# Patient Record
Sex: MALE | Race: WHITE | NOT HISPANIC OR LATINO | URBAN - METROPOLITAN AREA
[De-identification: names, ages, dates, MRNs, and addresses within clinical notes are randomized per-mention and may not be internally consistent; named-entity substitution may affect disease eponyms.]

---

## 2017-01-13 ENCOUNTER — INPATIENT (INPATIENT)
Facility: HOSPITAL | Age: 67
LOS: 17 days | Discharge: NURSING, REHAB OR LONG-TERM CARE | End: 2017-01-31
Attending: PSYCHIATRY & NEUROLOGY | Admitting: PSYCHIATRY & NEUROLOGY
Payer: MEDICARE

## 2017-01-13 VITALS
HEART RATE: 98 BPM | DIASTOLIC BLOOD PRESSURE: 100 MMHG | SYSTOLIC BLOOD PRESSURE: 168 MMHG | RESPIRATION RATE: 16 BRPM | TEMPERATURE: 98 F

## 2017-01-13 DIAGNOSIS — F33.9 MAJOR DEPRESSIVE DISORDER, RECURRENT, UNSPECIFIED: ICD-10-CM

## 2017-01-13 DIAGNOSIS — F20.9 SCHIZOPHRENIA, UNSPECIFIED: ICD-10-CM

## 2017-01-13 DIAGNOSIS — R69 ILLNESS, UNSPECIFIED: ICD-10-CM

## 2017-01-13 LAB
ALBUMIN SERPL ELPH-MCNC: 4.7 G/DL — SIGNIFICANT CHANGE UP (ref 3.3–5)
ALP SERPL-CCNC: 67 U/L — SIGNIFICANT CHANGE UP (ref 40–120)
ALT FLD-CCNC: 19 U/L — SIGNIFICANT CHANGE UP (ref 4–41)
AMPHET UR-MCNC: NEGATIVE — SIGNIFICANT CHANGE UP
APAP SERPL-MCNC: < 15 UG/ML — LOW (ref 15–25)
APPEARANCE UR: CLEAR — SIGNIFICANT CHANGE UP
AST SERPL-CCNC: 22 U/L — SIGNIFICANT CHANGE UP (ref 4–40)
BARBITURATES MEASUREMENT: NEGATIVE — SIGNIFICANT CHANGE UP
BARBITURATES UR SCN-MCNC: NEGATIVE — SIGNIFICANT CHANGE UP
BASOPHILS # BLD AUTO: 0.02 K/UL — SIGNIFICANT CHANGE UP (ref 0–0.2)
BASOPHILS NFR BLD AUTO: 0.2 % — SIGNIFICANT CHANGE UP (ref 0–2)
BASOPHILS NFR SPEC: 0 % — SIGNIFICANT CHANGE UP (ref 0–2)
BENZODIAZ SERPL-MCNC: NEGATIVE — SIGNIFICANT CHANGE UP
BENZODIAZ UR-MCNC: NEGATIVE — SIGNIFICANT CHANGE UP
BILIRUB SERPL-MCNC: 0.4 MG/DL — SIGNIFICANT CHANGE UP (ref 0.2–1.2)
BILIRUB UR-MCNC: NEGATIVE — SIGNIFICANT CHANGE UP
BLASTS # FLD: 0 % — SIGNIFICANT CHANGE UP (ref 0–0)
BLOOD UR QL VISUAL: NEGATIVE — SIGNIFICANT CHANGE UP
BUN SERPL-MCNC: 10 MG/DL — SIGNIFICANT CHANGE UP (ref 7–23)
CALCIUM SERPL-MCNC: 10 MG/DL — SIGNIFICANT CHANGE UP (ref 8.4–10.5)
CANNABINOIDS UR-MCNC: NEGATIVE — SIGNIFICANT CHANGE UP
CHLORIDE SERPL-SCNC: 97 MMOL/L — LOW (ref 98–107)
CO2 SERPL-SCNC: 22 MMOL/L — SIGNIFICANT CHANGE UP (ref 22–31)
COCAINE METAB.OTHER UR-MCNC: NEGATIVE — SIGNIFICANT CHANGE UP
COLOR SPEC: SIGNIFICANT CHANGE UP
CREAT SERPL-MCNC: 0.82 MG/DL — SIGNIFICANT CHANGE UP (ref 0.5–1.3)
EOSINOPHIL # BLD AUTO: 0.09 K/UL — SIGNIFICANT CHANGE UP (ref 0–0.5)
EOSINOPHIL NFR BLD AUTO: 0.8 % — SIGNIFICANT CHANGE UP (ref 0–6)
EOSINOPHIL NFR FLD: 0.9 % — SIGNIFICANT CHANGE UP (ref 0–6)
ETHANOL BLD-MCNC: < 10 MG/DL — SIGNIFICANT CHANGE UP
GLUCOSE SERPL-MCNC: 107 MG/DL — HIGH (ref 70–99)
GLUCOSE UR-MCNC: NEGATIVE — SIGNIFICANT CHANGE UP
HCT VFR BLD CALC: 40.4 % — SIGNIFICANT CHANGE UP (ref 39–50)
HGB BLD-MCNC: 12.9 G/DL — LOW (ref 13–17)
IMM GRANULOCYTES NFR BLD AUTO: 0.3 % — SIGNIFICANT CHANGE UP (ref 0–1.5)
KETONES UR-MCNC: NEGATIVE — SIGNIFICANT CHANGE UP
LEUKOCYTE ESTERASE UR-ACNC: NEGATIVE — SIGNIFICANT CHANGE UP
LYMPHOCYTES # BLD AUTO: 0.96 K/UL — LOW (ref 1–3.3)
LYMPHOCYTES # BLD AUTO: 8.8 % — LOW (ref 13–44)
LYMPHOCYTES NFR SPEC AUTO: 5.2 % — LOW (ref 13–44)
MAGNESIUM SERPL-MCNC: 2.1 MG/DL — SIGNIFICANT CHANGE UP (ref 1.6–2.6)
MCHC RBC-ENTMCNC: 20.1 PG — LOW (ref 27–34)
MCHC RBC-ENTMCNC: 31.9 % — LOW (ref 32–36)
MCV RBC AUTO: 62.9 FL — LOW (ref 80–100)
METAMYELOCYTES # FLD: 0 % — SIGNIFICANT CHANGE UP (ref 0–1)
METHADONE UR-MCNC: NEGATIVE — SIGNIFICANT CHANGE UP
MONOCYTES # BLD AUTO: 1.25 K/UL — HIGH (ref 0–0.9)
MONOCYTES NFR BLD AUTO: 11.5 % — SIGNIFICANT CHANGE UP (ref 2–14)
MONOCYTES NFR BLD: 7 % — SIGNIFICANT CHANGE UP (ref 2–9)
MUCOUS THREADS # UR AUTO: SIGNIFICANT CHANGE UP
MYELOCYTES NFR BLD: 0 % — SIGNIFICANT CHANGE UP (ref 0–0)
NEUTROPHIL AB SER-ACNC: 84.3 % — HIGH (ref 43–77)
NEUTROPHILS # BLD AUTO: 8.5 K/UL — HIGH (ref 1.8–7.4)
NEUTROPHILS NFR BLD AUTO: 78.4 % — HIGH (ref 43–77)
NEUTS BAND # BLD: 0 % — SIGNIFICANT CHANGE UP (ref 0–6)
NITRITE UR-MCNC: NEGATIVE — SIGNIFICANT CHANGE UP
OPIATES UR-MCNC: NEGATIVE — SIGNIFICANT CHANGE UP
OTHER - HEMATOLOGY %: 0.9 — SIGNIFICANT CHANGE UP
OXYCODONE UR-MCNC: NEGATIVE — SIGNIFICANT CHANGE UP
PCP UR-MCNC: NEGATIVE — SIGNIFICANT CHANGE UP
PH UR: 6.5 — SIGNIFICANT CHANGE UP (ref 4.6–8)
PHOSPHATE SERPL-MCNC: 3.5 MG/DL — SIGNIFICANT CHANGE UP (ref 2.5–4.5)
PLATELET # BLD AUTO: 367 K/UL — SIGNIFICANT CHANGE UP (ref 150–400)
PMV BLD: 10.5 FL — SIGNIFICANT CHANGE UP (ref 7–13)
POTASSIUM SERPL-MCNC: 3.8 MMOL/L — SIGNIFICANT CHANGE UP (ref 3.5–5.3)
POTASSIUM SERPL-SCNC: 3.8 MMOL/L — SIGNIFICANT CHANGE UP (ref 3.5–5.3)
PROMYELOCYTES # FLD: 0 % — SIGNIFICANT CHANGE UP (ref 0–0)
PROT SERPL-MCNC: 7.9 G/DL — SIGNIFICANT CHANGE UP (ref 6–8.3)
PROT UR-MCNC: NEGATIVE — SIGNIFICANT CHANGE UP
RBC # BLD: 6.42 M/UL — HIGH (ref 4.2–5.8)
RBC # FLD: 15.8 % — HIGH (ref 10.3–14.5)
RBC CASTS # UR COMP ASSIST: SIGNIFICANT CHANGE UP (ref 0–?)
SALICYLATES SERPL-MCNC: < 5 MG/DL — LOW (ref 15–30)
SODIUM SERPL-SCNC: 138 MMOL/L — SIGNIFICANT CHANGE UP (ref 135–145)
SP GR SPEC: 1.01 — SIGNIFICANT CHANGE UP (ref 1–1.03)
TSH SERPL-MCNC: 2.3 UIU/ML — SIGNIFICANT CHANGE UP (ref 0.27–4.2)
UROBILINOGEN FLD QL: NORMAL E.U. — SIGNIFICANT CHANGE UP (ref 0.1–0.2)
VARIANT LYMPHS # BLD: 1.7 % — SIGNIFICANT CHANGE UP
WBC # BLD: 10.85 K/UL — HIGH (ref 3.8–10.5)
WBC # FLD AUTO: 10.85 K/UL — HIGH (ref 3.8–10.5)
WBC UR QL: SIGNIFICANT CHANGE UP (ref 0–?)

## 2017-01-13 PROCEDURE — 99285 EMERGENCY DEPT VISIT HI MDM: CPT | Mod: GC

## 2017-01-13 RX ORDER — FLUPHENAZINE HYDROCHLORIDE 1 MG/1
7.5 TABLET, FILM COATED ORAL DAILY
Qty: 0 | Refills: 0 | Status: DISCONTINUED | OUTPATIENT
Start: 2017-01-13 | End: 2017-01-13

## 2017-01-13 RX ORDER — SIMVASTATIN 20 MG/1
10 TABLET, FILM COATED ORAL AT BEDTIME
Qty: 0 | Refills: 0 | Status: DISCONTINUED | OUTPATIENT
Start: 2017-01-13 | End: 2017-01-31

## 2017-01-13 RX ORDER — LORATADINE 10 MG/1
10 TABLET ORAL DAILY
Qty: 0 | Refills: 0 | Status: DISCONTINUED | OUTPATIENT
Start: 2017-01-13 | End: 2017-01-31

## 2017-01-13 RX ORDER — TRAZODONE HCL 50 MG
100 TABLET ORAL AT BEDTIME
Qty: 0 | Refills: 0 | Status: DISCONTINUED | OUTPATIENT
Start: 2017-01-13 | End: 2017-01-31

## 2017-01-13 RX ORDER — FLUPHENAZINE HYDROCHLORIDE 1 MG/1
10 TABLET, FILM COATED ORAL DAILY
Qty: 0 | Refills: 0 | Status: DISCONTINUED | OUTPATIENT
Start: 2017-01-13 | End: 2017-01-31

## 2017-01-13 RX ORDER — CARVEDILOL PHOSPHATE 80 MG/1
6.25 CAPSULE, EXTENDED RELEASE ORAL EVERY 12 HOURS
Qty: 0 | Refills: 0 | Status: DISCONTINUED | OUTPATIENT
Start: 2017-01-13 | End: 2017-01-31

## 2017-01-13 RX ORDER — FERROUS SULFATE 325(65) MG
325 TABLET ORAL DAILY
Qty: 0 | Refills: 0 | Status: DISCONTINUED | OUTPATIENT
Start: 2017-01-13 | End: 2017-01-31

## 2017-01-13 RX ORDER — CLOPIDOGREL BISULFATE 75 MG/1
75 TABLET, FILM COATED ORAL DAILY
Qty: 0 | Refills: 0 | Status: DISCONTINUED | OUTPATIENT
Start: 2017-01-13 | End: 2017-01-31

## 2017-01-13 RX ORDER — LISINOPRIL 2.5 MG/1
2.5 TABLET ORAL DAILY
Qty: 0 | Refills: 0 | Status: DISCONTINUED | OUTPATIENT
Start: 2017-01-13 | End: 2017-01-31

## 2017-01-13 RX ORDER — MECLIZINE HCL 12.5 MG
25 TABLET ORAL THREE TIMES A DAY
Qty: 0 | Refills: 0 | Status: DISCONTINUED | OUTPATIENT
Start: 2017-01-13 | End: 2017-01-31

## 2017-01-13 RX ORDER — FLUOXETINE HCL 10 MG
20 CAPSULE ORAL DAILY
Qty: 0 | Refills: 0 | Status: DISCONTINUED | OUTPATIENT
Start: 2017-01-13 | End: 2017-01-31

## 2017-01-13 RX ADMIN — Medication 100 MILLIGRAM(S): at 21:52

## 2017-01-13 NOTE — ED ADULT NURSE REASSESSMENT NOTE - NS ED NURSE REASSESS COMMENT FT1
17:40-Patient admitted to [facility], report given to Olaf HOFF, medical clearance complete, pt transported to [facility] via security, safety maintained.

## 2017-01-13 NOTE — ED BEHAVIORAL HEALTH ASSESSMENT NOTE - DESCRIPTION
Calm and cooperative. Anemia, Angina, CHF, HTN, Hyperlipidemia, Orthostatic Hypotension, Pacemaker, Syncope, Vertigo Lives in assisted living home. single. no children

## 2017-01-13 NOTE — ED PROVIDER NOTE - OBJECTIVE STATEMENT
The patient is a 66y Male hx schizophrenia, HTN, inferolateral ischemia in 2009 presents to ED requesting to speak with a psychiatrist but does not want to states why he wants to speaks with psychiatrist.  Denies all other complaints, no HI/SI, no AVH.  Pt self inflicted injuries, trauma or falls, no headache, back or neck pain, no nausea or vomiting, no fever or chills, no cp or sob, no palpitation or diaphoresis.  Denies etoh or illicit drugs. Endorsed no other symptoms The patient is a 66y Male hx schizophrenia, HTN, inferolateral ischemia in 2009 presents to ED requesting to speak with a psychiatrist but does not want to states why he wants to speaks with psychiatrist.  Denies all other complaints, no HI/SI, no AVH.  Pt denies self inflicted injuries, trauma or falls, no headache, back or neck pain, no nausea or vomiting, no fever or chills, no cp or sob, no palpitation or diaphoresis.  Denies etoh or illicit drugs. Endorsed no other symptoms The patient is a 66y Male hx schizophrenia dx in 1970, HTN, inferolateral ischemia in 2009 (EF 30-35%), PPM presents to ED requesting to speak with a psychiatrist but does not want to states why he wants to speaks with psychiatrist.  Denies all other complaints, no HI/SI, no AVH.  Pt denies self inflicted injuries, trauma or falls, no headache, back or neck pain, no nausea or vomiting, no fever or chills, no cp or sob, no palpitation or diaphoresis.  Denies etoh or illicit drugs. Endorsed no other symptoms.

## 2017-01-13 NOTE — ED BEHAVIORAL HEALTH ASSESSMENT NOTE - SUICIDE PROTECTIVE FACTORS
Identifies reasons for living/Positive therapeutic relationships/Supportive social network or family

## 2017-01-13 NOTE — ED BEHAVIORAL HEALTH ASSESSMENT NOTE - SUMMARY
66yr old M, single, domiciled at [facility], on SSD, with a psych hx of Schizophrenia and med hx of CHF, HTN, Vertigo, syncope, hyperlipidemia, pacemaker, angina, anemia multiple hospitalizations, no known legal hx, no substance hx came from Assisted Living home by bus seeking hospitalization secondary to psychosis and paranoia. Patient at this time is paranoid, having grandiose delusions as well as persecutory delusions and is afraid to return to his residence therefore needs a psychiatric admission as he cannot safely plan for his housing situation and care for himself and will require appropriate stabilization. Denying any AH/VH or SI/HI.

## 2017-01-13 NOTE — ED PROVIDER NOTE - CHPI ED SYMPTOMS NEG
no suicidal/no hallucinations/no weight loss/no change in level of consciousness/no homicidal/no weakness/no confusion

## 2017-01-13 NOTE — ED BEHAVIORAL HEALTH ASSESSMENT NOTE - MEDICAL ISSUES AND PLAN (INCLUDE STANDING AND PRN MEDICATION)
CHF coreg 6.25mg BID, HTN - Lisinopril 2.5mg po daily, Angina, Anemia - iron 325mg daily, Hyperlipidemia - Zocor 10mg HS, Plavix - 75mg daily, vertigo 25mg po TID, Clobetasol ointment 0.05% apply over legs daily

## 2017-01-13 NOTE — ED BEHAVIORAL HEALTH ASSESSMENT NOTE - RISK ASSESSMENT
Patient is currently a high risk at this time because of his active psychosis, poor insight, prior hospitalizations, recent change in treatment and limited insight into his symptoms. Protective factor include good therapeutic relationship, no substance abuse, no fam hx, good access to health care and hx of med adherence.

## 2017-01-13 NOTE — ED BEHAVIORAL HEALTH ASSESSMENT NOTE - DIFFERENTIAL
Schizophrenia  r/o SAD  R/O bipolar I Disorder with psychotic features  r/o mdd with psychotic features
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
## 2017-01-13 NOTE — ED ADULT NURSE REASSESSMENT NOTE - NS ED NURSE REASSESS COMMENT FT1
13:25-Patient presents calm and cooperative, NAD, blood work and EKG done, needs met, pt currently in BH  room 6, awaiting further MD evaluation, will continue to monitor pt.

## 2017-01-13 NOTE — ED PROVIDER NOTE - MEDICAL DECISION MAKING DETAILS
66y Male hx schizophrenia, HTN, inferolateral ischemia in 2009 presents to ED requesting to speak with a psychiatrist but does not want to state why he wants to speak with psychiatrist.  Pt is otherwise well appearing in NAD, no visible injuries on exam-   Will check CBC w/diff, CMP, TSH, Tox, UA, and ECG-  Remainder of plan as per psych- 66y Male hx schizophrenia, HTN, inferolateral ischemia in 2009 presents to ED requesting to speak with a psychiatrist but does not want to state why he wants to speak with psychiatrist.  Pt is otherwise well appearing in NAD, no visible injuries on exam-   Will check CBC w/diff, CMP, TSH, Tox, UA, and ECG-  Remainder of plan as per psych-Labs  notable for mild anemia, QTC- 522 on long standing antipsychotic meds. Will check mag and phos-  Discussed with psychiatry to hold haldol- 66y Male hx schizophrenia, HTN, inferolateral ischemia in 2009 presents to ED requesting to speak with a psychiatrist but does not want to state why he wants to speak with psychiatrist.  Pt is otherwise well appearing in NAD, no visible injuries on exam-   Will check CBC w/diff, CMP, TSH, Tox, UA, and ECG-  Remainder of plan as per psych-Labs notable for  anemia, QTC- 522 on long standing antipsychotic meds. Discussed with psychiatry and Dr. Demarco- hold haldol and will check mag and phos-

## 2017-01-14 RX ADMIN — FLUPHENAZINE HYDROCHLORIDE 10 MILLIGRAM(S): 1 TABLET, FILM COATED ORAL at 08:32

## 2017-01-14 RX ADMIN — Medication 100 MILLIGRAM(S): at 20:40

## 2017-01-15 PROCEDURE — 99232 SBSQ HOSP IP/OBS MODERATE 35: CPT

## 2017-01-15 RX ADMIN — CARVEDILOL PHOSPHATE 6.25 MILLIGRAM(S): 80 CAPSULE, EXTENDED RELEASE ORAL at 20:23

## 2017-01-15 RX ADMIN — SIMVASTATIN 10 MILLIGRAM(S): 20 TABLET, FILM COATED ORAL at 20:23

## 2017-01-15 RX ADMIN — Medication 100 MILLIGRAM(S): at 20:23

## 2017-01-15 RX ADMIN — LORATADINE 10 MILLIGRAM(S): 10 TABLET ORAL at 10:03

## 2017-01-15 RX ADMIN — Medication 20 MILLIGRAM(S): at 10:03

## 2017-01-15 RX ADMIN — LISINOPRIL 2.5 MILLIGRAM(S): 2.5 TABLET ORAL at 10:03

## 2017-01-15 RX ADMIN — Medication 25 MILLIGRAM(S): at 20:23

## 2017-01-15 RX ADMIN — CLOPIDOGREL BISULFATE 75 MILLIGRAM(S): 75 TABLET, FILM COATED ORAL at 10:03

## 2017-01-15 RX ADMIN — FLUPHENAZINE HYDROCHLORIDE 10 MILLIGRAM(S): 1 TABLET, FILM COATED ORAL at 10:03

## 2017-01-16 PROCEDURE — 99232 SBSQ HOSP IP/OBS MODERATE 35: CPT

## 2017-01-16 RX ADMIN — LISINOPRIL 2.5 MILLIGRAM(S): 2.5 TABLET ORAL at 09:05

## 2017-01-16 RX ADMIN — LORATADINE 10 MILLIGRAM(S): 10 TABLET ORAL at 09:05

## 2017-01-16 RX ADMIN — CLOPIDOGREL BISULFATE 75 MILLIGRAM(S): 75 TABLET, FILM COATED ORAL at 09:04

## 2017-01-16 RX ADMIN — FLUPHENAZINE HYDROCHLORIDE 10 MILLIGRAM(S): 1 TABLET, FILM COATED ORAL at 09:05

## 2017-01-17 PROCEDURE — 99232 SBSQ HOSP IP/OBS MODERATE 35: CPT

## 2017-01-17 RX ADMIN — FLUPHENAZINE HYDROCHLORIDE 10 MILLIGRAM(S): 1 TABLET, FILM COATED ORAL at 08:56

## 2017-01-18 PROCEDURE — 99232 SBSQ HOSP IP/OBS MODERATE 35: CPT

## 2017-01-18 RX ADMIN — SIMVASTATIN 10 MILLIGRAM(S): 20 TABLET, FILM COATED ORAL at 22:34

## 2017-01-18 RX ADMIN — CARVEDILOL PHOSPHATE 6.25 MILLIGRAM(S): 80 CAPSULE, EXTENDED RELEASE ORAL at 22:34

## 2017-01-18 RX ADMIN — Medication 25 MILLIGRAM(S): at 22:34

## 2017-01-18 RX ADMIN — Medication 100 MILLIGRAM(S): at 22:34

## 2017-01-19 PROCEDURE — 99232 SBSQ HOSP IP/OBS MODERATE 35: CPT

## 2017-01-19 RX ADMIN — CARVEDILOL PHOSPHATE 6.25 MILLIGRAM(S): 80 CAPSULE, EXTENDED RELEASE ORAL at 20:55

## 2017-01-19 RX ADMIN — FLUPHENAZINE HYDROCHLORIDE 10 MILLIGRAM(S): 1 TABLET, FILM COATED ORAL at 09:09

## 2017-01-19 RX ADMIN — SIMVASTATIN 10 MILLIGRAM(S): 20 TABLET, FILM COATED ORAL at 20:55

## 2017-01-19 RX ADMIN — Medication 100 MILLIGRAM(S): at 20:55

## 2017-01-19 RX ADMIN — Medication 25 MILLIGRAM(S): at 20:55

## 2017-01-19 RX ADMIN — Medication 20 MILLIGRAM(S): at 09:09

## 2017-01-20 PROCEDURE — 99232 SBSQ HOSP IP/OBS MODERATE 35: CPT

## 2017-01-20 RX ADMIN — FLUPHENAZINE HYDROCHLORIDE 10 MILLIGRAM(S): 1 TABLET, FILM COATED ORAL at 09:27

## 2017-01-20 RX ADMIN — Medication 20 MILLIGRAM(S): at 09:27

## 2017-01-20 RX ADMIN — Medication 1 APPLICATION(S): at 09:26

## 2017-01-20 RX ADMIN — CARVEDILOL PHOSPHATE 6.25 MILLIGRAM(S): 80 CAPSULE, EXTENDED RELEASE ORAL at 09:26

## 2017-01-20 RX ADMIN — CLOPIDOGREL BISULFATE 75 MILLIGRAM(S): 75 TABLET, FILM COATED ORAL at 09:26

## 2017-01-20 RX ADMIN — LORATADINE 10 MILLIGRAM(S): 10 TABLET ORAL at 09:27

## 2017-01-20 RX ADMIN — Medication 100 MILLIGRAM(S): at 21:00

## 2017-01-20 RX ADMIN — Medication 25 MILLIGRAM(S): at 09:27

## 2017-01-20 RX ADMIN — LISINOPRIL 2.5 MILLIGRAM(S): 2.5 TABLET ORAL at 09:27

## 2017-01-20 RX ADMIN — Medication 25 MILLIGRAM(S): at 21:00

## 2017-01-20 RX ADMIN — SIMVASTATIN 10 MILLIGRAM(S): 20 TABLET, FILM COATED ORAL at 21:00

## 2017-01-20 RX ADMIN — CARVEDILOL PHOSPHATE 6.25 MILLIGRAM(S): 80 CAPSULE, EXTENDED RELEASE ORAL at 21:00

## 2017-01-21 RX ADMIN — LORATADINE 10 MILLIGRAM(S): 10 TABLET ORAL at 10:17

## 2017-01-21 RX ADMIN — Medication 20 MILLIGRAM(S): at 10:17

## 2017-01-21 RX ADMIN — Medication 25 MILLIGRAM(S): at 20:56

## 2017-01-21 RX ADMIN — Medication 25 MILLIGRAM(S): at 10:17

## 2017-01-21 RX ADMIN — FLUPHENAZINE HYDROCHLORIDE 10 MILLIGRAM(S): 1 TABLET, FILM COATED ORAL at 10:17

## 2017-01-21 RX ADMIN — LISINOPRIL 2.5 MILLIGRAM(S): 2.5 TABLET ORAL at 10:17

## 2017-01-21 RX ADMIN — CARVEDILOL PHOSPHATE 6.25 MILLIGRAM(S): 80 CAPSULE, EXTENDED RELEASE ORAL at 20:56

## 2017-01-21 RX ADMIN — CLOPIDOGREL BISULFATE 75 MILLIGRAM(S): 75 TABLET, FILM COATED ORAL at 10:17

## 2017-01-21 RX ADMIN — CARVEDILOL PHOSPHATE 6.25 MILLIGRAM(S): 80 CAPSULE, EXTENDED RELEASE ORAL at 10:16

## 2017-01-21 RX ADMIN — SIMVASTATIN 10 MILLIGRAM(S): 20 TABLET, FILM COATED ORAL at 20:56

## 2017-01-21 RX ADMIN — Medication 100 MILLIGRAM(S): at 20:56

## 2017-01-22 RX ADMIN — Medication 25 MILLIGRAM(S): at 08:40

## 2017-01-22 RX ADMIN — CARVEDILOL PHOSPHATE 6.25 MILLIGRAM(S): 80 CAPSULE, EXTENDED RELEASE ORAL at 09:38

## 2017-01-22 RX ADMIN — Medication 1 APPLICATION(S): at 09:38

## 2017-01-22 RX ADMIN — Medication 20 MILLIGRAM(S): at 09:39

## 2017-01-22 RX ADMIN — LORATADINE 10 MILLIGRAM(S): 10 TABLET ORAL at 08:40

## 2017-01-22 RX ADMIN — CLOPIDOGREL BISULFATE 75 MILLIGRAM(S): 75 TABLET, FILM COATED ORAL at 09:38

## 2017-01-22 RX ADMIN — Medication 325 MILLIGRAM(S): at 09:39

## 2017-01-22 RX ADMIN — FLUPHENAZINE HYDROCHLORIDE 10 MILLIGRAM(S): 1 TABLET, FILM COATED ORAL at 09:39

## 2017-01-22 RX ADMIN — Medication 25 MILLIGRAM(S): at 20:44

## 2017-01-22 RX ADMIN — SIMVASTATIN 10 MILLIGRAM(S): 20 TABLET, FILM COATED ORAL at 20:44

## 2017-01-22 RX ADMIN — CARVEDILOL PHOSPHATE 6.25 MILLIGRAM(S): 80 CAPSULE, EXTENDED RELEASE ORAL at 20:44

## 2017-01-22 RX ADMIN — Medication 100 MILLIGRAM(S): at 20:44

## 2017-01-22 RX ADMIN — LISINOPRIL 2.5 MILLIGRAM(S): 2.5 TABLET ORAL at 10:40

## 2017-01-23 PROCEDURE — 99232 SBSQ HOSP IP/OBS MODERATE 35: CPT

## 2017-01-23 RX ADMIN — FLUPHENAZINE HYDROCHLORIDE 10 MILLIGRAM(S): 1 TABLET, FILM COATED ORAL at 09:30

## 2017-01-23 RX ADMIN — CARVEDILOL PHOSPHATE 6.25 MILLIGRAM(S): 80 CAPSULE, EXTENDED RELEASE ORAL at 09:29

## 2017-01-23 RX ADMIN — Medication 100 MILLIGRAM(S): at 22:02

## 2017-01-23 RX ADMIN — LISINOPRIL 2.5 MILLIGRAM(S): 2.5 TABLET ORAL at 09:30

## 2017-01-23 RX ADMIN — Medication 325 MILLIGRAM(S): at 09:30

## 2017-01-23 RX ADMIN — Medication 20 MILLIGRAM(S): at 09:30

## 2017-01-23 RX ADMIN — SIMVASTATIN 10 MILLIGRAM(S): 20 TABLET, FILM COATED ORAL at 22:02

## 2017-01-23 RX ADMIN — LORATADINE 10 MILLIGRAM(S): 10 TABLET ORAL at 09:30

## 2017-01-23 RX ADMIN — CARVEDILOL PHOSPHATE 6.25 MILLIGRAM(S): 80 CAPSULE, EXTENDED RELEASE ORAL at 22:02

## 2017-01-23 RX ADMIN — CLOPIDOGREL BISULFATE 75 MILLIGRAM(S): 75 TABLET, FILM COATED ORAL at 09:30

## 2017-01-23 RX ADMIN — Medication 25 MILLIGRAM(S): at 09:30

## 2017-01-23 RX ADMIN — Medication 25 MILLIGRAM(S): at 12:06

## 2017-01-23 RX ADMIN — Medication 25 MILLIGRAM(S): at 22:02

## 2017-01-24 LAB
24R-OH-CALCIDIOL SERPL-MCNC: 23.6 NG/ML — LOW (ref 30–100)
CHOLEST SERPL-MCNC: 133 MG/DL — SIGNIFICANT CHANGE UP (ref 120–199)
FOLATE SERPL-MCNC: 18.5 NG/ML — SIGNIFICANT CHANGE UP (ref 4.7–20)
HBA1C BLD-MCNC: 5.6 % — SIGNIFICANT CHANGE UP (ref 4–5.6)
HDLC SERPL-MCNC: 38 MG/DL — SIGNIFICANT CHANGE UP (ref 35–55)
LIPID PNL WITH DIRECT LDL SERPL: 81 MG/DL — SIGNIFICANT CHANGE UP
T PALLIDUM AB TITR SER: NEGATIVE — SIGNIFICANT CHANGE UP
TRIGL SERPL-MCNC: 103 MG/DL — SIGNIFICANT CHANGE UP (ref 10–149)
VIT B12 SERPL-MCNC: 319 PG/ML — SIGNIFICANT CHANGE UP (ref 200–900)

## 2017-01-24 PROCEDURE — 99232 SBSQ HOSP IP/OBS MODERATE 35: CPT

## 2017-01-24 RX ADMIN — Medication 25 MILLIGRAM(S): at 21:05

## 2017-01-24 RX ADMIN — Medication 25 MILLIGRAM(S): at 13:14

## 2017-01-24 RX ADMIN — LISINOPRIL 2.5 MILLIGRAM(S): 2.5 TABLET ORAL at 09:31

## 2017-01-24 RX ADMIN — LORATADINE 10 MILLIGRAM(S): 10 TABLET ORAL at 09:31

## 2017-01-24 RX ADMIN — CARVEDILOL PHOSPHATE 6.25 MILLIGRAM(S): 80 CAPSULE, EXTENDED RELEASE ORAL at 21:05

## 2017-01-24 RX ADMIN — Medication 100 MILLIGRAM(S): at 21:05

## 2017-01-24 RX ADMIN — FLUPHENAZINE HYDROCHLORIDE 10 MILLIGRAM(S): 1 TABLET, FILM COATED ORAL at 09:30

## 2017-01-24 RX ADMIN — Medication 25 MILLIGRAM(S): at 09:31

## 2017-01-24 RX ADMIN — CLOPIDOGREL BISULFATE 75 MILLIGRAM(S): 75 TABLET, FILM COATED ORAL at 09:30

## 2017-01-24 RX ADMIN — CARVEDILOL PHOSPHATE 6.25 MILLIGRAM(S): 80 CAPSULE, EXTENDED RELEASE ORAL at 09:30

## 2017-01-24 RX ADMIN — Medication 20 MILLIGRAM(S): at 09:30

## 2017-01-24 RX ADMIN — Medication 1 APPLICATION(S): at 09:30

## 2017-01-24 RX ADMIN — SIMVASTATIN 10 MILLIGRAM(S): 20 TABLET, FILM COATED ORAL at 21:05

## 2017-01-25 VITALS — TEMPERATURE: 98 F

## 2017-01-25 PROCEDURE — 99232 SBSQ HOSP IP/OBS MODERATE 35: CPT

## 2017-01-25 RX ADMIN — Medication 20 MILLIGRAM(S): at 09:05

## 2017-01-25 RX ADMIN — LORATADINE 10 MILLIGRAM(S): 10 TABLET ORAL at 09:05

## 2017-01-25 RX ADMIN — FLUPHENAZINE HYDROCHLORIDE 10 MILLIGRAM(S): 1 TABLET, FILM COATED ORAL at 09:05

## 2017-01-25 RX ADMIN — LISINOPRIL 2.5 MILLIGRAM(S): 2.5 TABLET ORAL at 09:05

## 2017-01-25 RX ADMIN — CLOPIDOGREL BISULFATE 75 MILLIGRAM(S): 75 TABLET, FILM COATED ORAL at 09:06

## 2017-01-25 RX ADMIN — Medication 25 MILLIGRAM(S): at 09:05

## 2017-01-25 RX ADMIN — CARVEDILOL PHOSPHATE 6.25 MILLIGRAM(S): 80 CAPSULE, EXTENDED RELEASE ORAL at 09:06

## 2017-01-26 PROCEDURE — 99232 SBSQ HOSP IP/OBS MODERATE 35: CPT

## 2017-01-27 PROCEDURE — 99232 SBSQ HOSP IP/OBS MODERATE 35: CPT

## 2017-01-28 PROCEDURE — 99231 SBSQ HOSP IP/OBS SF/LOW 25: CPT

## 2017-01-29 PROCEDURE — 99231 SBSQ HOSP IP/OBS SF/LOW 25: CPT

## 2017-01-30 PROCEDURE — 99232 SBSQ HOSP IP/OBS MODERATE 35: CPT

## 2017-01-30 RX ORDER — CLOPIDOGREL BISULFATE 75 MG/1
1 TABLET, FILM COATED ORAL
Qty: 15 | Refills: 0
Start: 2017-01-30 | End: 2017-02-14

## 2017-01-30 RX ORDER — LORATADINE 10 MG/1
1 TABLET ORAL
Qty: 15 | Refills: 0
Start: 2017-01-30 | End: 2017-02-14

## 2017-01-30 RX ORDER — FLUOXETINE HCL 10 MG
1 CAPSULE ORAL
Qty: 15 | Refills: 0
Start: 2017-01-30 | End: 2017-02-14

## 2017-01-30 RX ORDER — TRAZODONE HCL 50 MG
1 TABLET ORAL
Qty: 15 | Refills: 0
Start: 2017-01-30 | End: 2017-02-14

## 2017-01-30 RX ORDER — MECLIZINE HCL 12.5 MG
1 TABLET ORAL
Qty: 45 | Refills: 0 | OUTPATIENT
Start: 2017-01-30 | End: 2017-02-14

## 2017-01-30 RX ORDER — MECLIZINE HCL 12.5 MG
1 TABLET ORAL
Qty: 45 | Refills: 0
Start: 2017-01-30 | End: 2017-02-14

## 2017-01-30 RX ORDER — LISINOPRIL 2.5 MG/1
1 TABLET ORAL
Qty: 15 | Refills: 0
Start: 2017-01-30 | End: 2017-02-14

## 2017-01-30 RX ORDER — SIMVASTATIN 20 MG/1
1 TABLET, FILM COATED ORAL
Qty: 15 | Refills: 0 | OUTPATIENT
Start: 2017-01-30 | End: 2017-02-14

## 2017-01-30 RX ORDER — FLUPHENAZINE HYDROCHLORIDE 1 MG/1
1 TABLET, FILM COATED ORAL
Qty: 15 | Refills: 0
Start: 2017-01-30 | End: 2017-02-14

## 2017-01-30 RX ORDER — FLUOXETINE HCL 10 MG
1 CAPSULE ORAL
Qty: 15 | Refills: 0 | OUTPATIENT
Start: 2017-01-30 | End: 2017-02-14

## 2017-01-30 RX ORDER — CARVEDILOL PHOSPHATE 80 MG/1
1 CAPSULE, EXTENDED RELEASE ORAL
Qty: 30 | Refills: 0 | OUTPATIENT
Start: 2017-01-30 | End: 2017-02-14

## 2017-01-30 RX ORDER — SIMVASTATIN 20 MG/1
1 TABLET, FILM COATED ORAL
Qty: 15 | Refills: 0
Start: 2017-01-30 | End: 2017-02-14

## 2017-01-30 RX ORDER — CLOPIDOGREL BISULFATE 75 MG/1
1 TABLET, FILM COATED ORAL
Qty: 15 | Refills: 0 | OUTPATIENT
Start: 2017-01-30 | End: 2017-02-14

## 2017-01-30 RX ORDER — FLUPHENAZINE HYDROCHLORIDE 1 MG/1
1 TABLET, FILM COATED ORAL
Qty: 15 | Refills: 0 | OUTPATIENT
Start: 2017-01-30 | End: 2017-02-14

## 2017-01-30 RX ORDER — CARVEDILOL PHOSPHATE 80 MG/1
1 CAPSULE, EXTENDED RELEASE ORAL
Qty: 30 | Refills: 0
Start: 2017-01-30 | End: 2017-02-14

## 2017-01-30 RX ORDER — TRAZODONE HCL 50 MG
1 TABLET ORAL
Qty: 15 | Refills: 0 | OUTPATIENT
Start: 2017-01-30 | End: 2017-02-14

## 2017-01-30 RX ORDER — LISINOPRIL 2.5 MG/1
1 TABLET ORAL
Qty: 15 | Refills: 0 | OUTPATIENT
Start: 2017-01-30 | End: 2017-02-14

## 2017-01-30 RX ORDER — LORATADINE 10 MG/1
1 TABLET ORAL
Qty: 15 | Refills: 0 | OUTPATIENT
Start: 2017-01-30 | End: 2017-02-14

## 2017-01-31 PROCEDURE — 99238 HOSP IP/OBS DSCHRG MGMT 30/<: CPT

## 2017-12-14 NOTE — ED ADULT TRIAGE NOTE - RESPIRATORY RATE (BREATHS/MIN)
Welcome to the [facility]. ..your Lifestyle Renovation begins now! Thank you for placing your trust in our health care team, I look forward to working with you along this journey to better health!     Next steps:     1.  Schedule a p work with little time commitment. NAZARIO Hurd to [facility] group weight loss clinic. We are excited that you are committed to improving your health and have invited our practice to be part of your journey.  Our approach to the medic - Eat slowly and take 20 to 30 minutes to complete each meal  - Do not drink your calories ( no regular pop, juice, high calorie coffee drinks, limit alcohol)  - Don't deprive yourself of food, make smart choices  - Write down everything you eat right aw health!     1.___________________________________________________________    2.___________________________________________________________    3.___________________________________________________________    4.________________________________________________ pounds by this time next year! Don't forget to schedule your 1 month follow-up visit! Topiramate tablets  Brand Names: Topamax, Topiragen  What is this medicine?   TOPIRAMATE (toe PYRE a mate) is used to treat seizures in adults or c not take this medicine with any of the following medications:  · probenecid  This medicine may also interact with the following medications:  · acetazolamide  · alcohol  · amitriptyline  · aspirin and aspirin-like medicines  · birth control pills  · certai medicine? Visit your doctor or health care professional for regular checks on your progress. Do not stop taking this medicine suddenly. This increases the risk of seizures if you are using this medicine to control epilepsy.  Wear a medical identification b or loss of consciousness. Call your doctor immediately if you experience any of these side effects. Also, tell your doctor about any surgery you plan on having while taking this medicine since this may increase your risk for metabolic acidosis.   Birth cont 16

## 2018-11-01 NOTE — ED PROVIDER NOTE - HEME LYMPH, MLM
No adenopathy or splenomegaly. No cervical or inguinal lymphadenopathy.
Cataract  bilateral eyes  Cholecystitis    Fibroids    GERD (gastroesophageal reflux disease)    Malignant neoplasm of breast (female)  left breast-initial dx 2010, pt had mastectomy with silicone breast implant recoonstruction , no chemo no RT,   left breast reccurence- March 2017, mastectomy done , no RT, no chemo, pt on Tamoxifen orally  Meningioma  brain - sx in 2015  Mild intermittent asthma, unspecified whether complicated  last time use of Ventolin 1 week ago, last hospitalization 10 years ago, never intubated  Polio  as child- pt uses cane, unsteady gait, left leg shorter  Scoliosis    Seizure  first episode 2003, pt on Keppra , under care of Neurology, last episode 6 months ago, as per pt usually is one attack per year- Keppra level sent

## 2022-09-22 NOTE — ED BEHAVIORAL HEALTH ASSESSMENT NOTE - CASE SUMMARY
Detail Level: Zone Detail Level: Detailed 66yr old M, single, domiciled at [facility], on SSD, with a psych hx of Schizophrenia and med hx of CHF, HTN, Vertigo, syncope, hyperlipidemia, pacemaker, angina, anemia multiple hospitalizations, no known legal hx, no substance hx came from Assisted Living home by bus seeking hospitalization secondary to psychosis and paranoia.  pt has multiple delusions which are impairing his ability to take care of his need of housing, he will be admitted for safety and stabilization.

## 2023-04-04 ENCOUNTER — INPATIENT (INPATIENT)
Facility: HOSPITAL | Age: 73
LOS: 5 days | Discharge: NURSING, REHAB OR LONG-TERM CARE | DRG: 871 | End: 2023-04-10
Attending: INTERNAL MEDICINE | Admitting: INTERNAL MEDICINE
Payer: MEDICARE

## 2023-04-04 VITALS
HEIGHT: 68 IN | TEMPERATURE: 102 F | WEIGHT: 231.04 LBS | HEART RATE: 114 BPM | OXYGEN SATURATION: 95 % | RESPIRATION RATE: 20 BRPM | SYSTOLIC BLOOD PRESSURE: 106 MMHG | DIASTOLIC BLOOD PRESSURE: 66 MMHG

## 2023-04-04 DIAGNOSIS — D64.9 ANEMIA, UNSPECIFIED: ICD-10-CM

## 2023-04-04 DIAGNOSIS — A41.9 SEPSIS, UNSPECIFIED ORGANISM: ICD-10-CM

## 2023-04-04 DIAGNOSIS — I10 ESSENTIAL (PRIMARY) HYPERTENSION: ICD-10-CM

## 2023-04-04 DIAGNOSIS — E78.5 HYPERLIPIDEMIA, UNSPECIFIED: ICD-10-CM

## 2023-04-04 DIAGNOSIS — Z29.9 ENCOUNTER FOR PROPHYLACTIC MEASURES, UNSPECIFIED: ICD-10-CM

## 2023-04-04 DIAGNOSIS — K21.9 GASTRO-ESOPHAGEAL REFLUX DISEASE WITHOUT ESOPHAGITIS: ICD-10-CM

## 2023-04-04 DIAGNOSIS — N17.9 ACUTE KIDNEY FAILURE, UNSPECIFIED: ICD-10-CM

## 2023-04-04 DIAGNOSIS — N39.0 URINARY TRACT INFECTION, SITE NOT SPECIFIED: ICD-10-CM

## 2023-04-04 DIAGNOSIS — E87.1 HYPO-OSMOLALITY AND HYPONATREMIA: ICD-10-CM

## 2023-04-04 DIAGNOSIS — R73.9 HYPERGLYCEMIA, UNSPECIFIED: ICD-10-CM

## 2023-04-04 DIAGNOSIS — F20.9 SCHIZOPHRENIA, UNSPECIFIED: ICD-10-CM

## 2023-04-04 DIAGNOSIS — I50.9 HEART FAILURE, UNSPECIFIED: ICD-10-CM

## 2023-04-04 DIAGNOSIS — Z95.810 PRESENCE OF AUTOMATIC (IMPLANTABLE) CARDIAC DEFIBRILLATOR: Chronic | ICD-10-CM

## 2023-04-04 LAB
ALBUMIN SERPL ELPH-MCNC: 2.9 G/DL — LOW (ref 3.5–5)
ALP SERPL-CCNC: 61 U/L — SIGNIFICANT CHANGE UP (ref 40–120)
ALT FLD-CCNC: 18 U/L DA — SIGNIFICANT CHANGE UP (ref 10–60)
ANION GAP SERPL CALC-SCNC: 5 MMOL/L — SIGNIFICANT CHANGE UP (ref 5–17)
ANION GAP SERPL CALC-SCNC: 6 MMOL/L — SIGNIFICANT CHANGE UP (ref 5–17)
ANISOCYTOSIS BLD QL: SLIGHT — SIGNIFICANT CHANGE UP
APPEARANCE UR: ABNORMAL
APTT BLD: 28.9 SEC — SIGNIFICANT CHANGE UP (ref 27.5–35.5)
AST SERPL-CCNC: 17 U/L — SIGNIFICANT CHANGE UP (ref 10–40)
BACTERIA # UR AUTO: ABNORMAL /HPF
BASOPHILS # BLD AUTO: 0 K/UL — SIGNIFICANT CHANGE UP (ref 0–0.2)
BASOPHILS NFR BLD AUTO: 0 % — SIGNIFICANT CHANGE UP (ref 0–2)
BILIRUB SERPL-MCNC: 0.8 MG/DL — SIGNIFICANT CHANGE UP (ref 0.2–1.2)
BILIRUB UR-MCNC: NEGATIVE — SIGNIFICANT CHANGE UP
BUN SERPL-MCNC: 17 MG/DL — SIGNIFICANT CHANGE UP (ref 7–18)
BUN SERPL-MCNC: 18 MG/DL — SIGNIFICANT CHANGE UP (ref 7–18)
CALCIUM SERPL-MCNC: 8.7 MG/DL — SIGNIFICANT CHANGE UP (ref 8.4–10.5)
CALCIUM SERPL-MCNC: 8.9 MG/DL — SIGNIFICANT CHANGE UP (ref 8.4–10.5)
CHLORIDE SERPL-SCNC: 103 MMOL/L — SIGNIFICANT CHANGE UP (ref 96–108)
CHLORIDE SERPL-SCNC: 103 MMOL/L — SIGNIFICANT CHANGE UP (ref 96–108)
CO2 SERPL-SCNC: 22 MMOL/L — SIGNIFICANT CHANGE UP (ref 22–31)
CO2 SERPL-SCNC: 23 MMOL/L — SIGNIFICANT CHANGE UP (ref 22–31)
COLOR SPEC: YELLOW — SIGNIFICANT CHANGE UP
COMMENT - URINE: SIGNIFICANT CHANGE UP
CREAT SERPL-MCNC: 1.27 MG/DL — SIGNIFICANT CHANGE UP (ref 0.5–1.3)
CREAT SERPL-MCNC: 1.53 MG/DL — HIGH (ref 0.5–1.3)
DIFF PNL FLD: ABNORMAL
EGFR: 48 ML/MIN/1.73M2 — LOW
EGFR: 60 ML/MIN/1.73M2 — SIGNIFICANT CHANGE UP
ELLIPTOCYTES BLD QL SMEAR: SLIGHT — SIGNIFICANT CHANGE UP
EOSINOPHIL # BLD AUTO: 0 K/UL — SIGNIFICANT CHANGE UP (ref 0–0.5)
EOSINOPHIL NFR BLD AUTO: 0 % — SIGNIFICANT CHANGE UP (ref 0–6)
FLUAV AG NPH QL: SIGNIFICANT CHANGE UP
FLUBV AG NPH QL: SIGNIFICANT CHANGE UP
GLUCOSE SERPL-MCNC: 140 MG/DL — HIGH (ref 70–99)
GLUCOSE SERPL-MCNC: 161 MG/DL — HIGH (ref 70–99)
GLUCOSE UR QL: NEGATIVE — SIGNIFICANT CHANGE UP
HCT VFR BLD CALC: 34.6 % — LOW (ref 39–50)
HGB BLD-MCNC: 11 G/DL — LOW (ref 13–17)
HYPOCHROMIA BLD QL: SLIGHT — SIGNIFICANT CHANGE UP
INR BLD: 1.39 RATIO — HIGH (ref 0.88–1.16)
KETONES UR-MCNC: ABNORMAL
LACTATE SERPL-SCNC: 2.3 MMOL/L — HIGH (ref 0.7–2)
LACTATE SERPL-SCNC: 3.6 MMOL/L — HIGH (ref 0.7–2)
LACTATE SERPL-SCNC: 4.4 MMOL/L — CRITICAL HIGH (ref 0.7–2)
LEUKOCYTE ESTERASE UR-ACNC: ABNORMAL
LYMPHOCYTES # BLD AUTO: 0.49 K/UL — LOW (ref 1–3.3)
LYMPHOCYTES # BLD AUTO: 2 % — LOW (ref 13–44)
MACROCYTES BLD QL: SLIGHT — SIGNIFICANT CHANGE UP
MANUAL SMEAR VERIFICATION: SIGNIFICANT CHANGE UP
MCHC RBC-ENTMCNC: 19.9 PG — LOW (ref 27–34)
MCHC RBC-ENTMCNC: 31.8 GM/DL — LOW (ref 32–36)
MCV RBC AUTO: 62.5 FL — LOW (ref 80–100)
METAMYELOCYTES # FLD: 1 % — HIGH (ref 0–0)
MICROCYTES BLD QL: SIGNIFICANT CHANGE UP
MONOCYTES # BLD AUTO: 0.97 K/UL — HIGH (ref 0–0.9)
MONOCYTES NFR BLD AUTO: 4 % — SIGNIFICANT CHANGE UP (ref 2–14)
MYELOCYTES NFR BLD: 4 % — HIGH (ref 0–0)
NEUTROPHILS # BLD AUTO: 21.66 K/UL — HIGH (ref 1.8–7.4)
NEUTROPHILS NFR BLD AUTO: 84 % — HIGH (ref 43–77)
NEUTS BAND # BLD: 5 % — SIGNIFICANT CHANGE UP (ref 0–8)
NITRITE UR-MCNC: POSITIVE
NRBC # BLD: 0 /100 — SIGNIFICANT CHANGE UP (ref 0–0)
OVALOCYTES BLD QL SMEAR: SLIGHT — SIGNIFICANT CHANGE UP
PH UR: 5 — SIGNIFICANT CHANGE UP (ref 5–8)
PLAT MORPH BLD: NORMAL — SIGNIFICANT CHANGE UP
PLATELET # BLD AUTO: 341 K/UL — SIGNIFICANT CHANGE UP (ref 150–400)
PLATELET COUNT - ESTIMATE: NORMAL — SIGNIFICANT CHANGE UP
POIKILOCYTOSIS BLD QL AUTO: SLIGHT — SIGNIFICANT CHANGE UP
POLYCHROMASIA BLD QL SMEAR: SLIGHT — SIGNIFICANT CHANGE UP
POTASSIUM SERPL-MCNC: 4 MMOL/L — SIGNIFICANT CHANGE UP (ref 3.5–5.3)
POTASSIUM SERPL-MCNC: 4.3 MMOL/L — SIGNIFICANT CHANGE UP (ref 3.5–5.3)
POTASSIUM SERPL-SCNC: 4 MMOL/L — SIGNIFICANT CHANGE UP (ref 3.5–5.3)
POTASSIUM SERPL-SCNC: 4.3 MMOL/L — SIGNIFICANT CHANGE UP (ref 3.5–5.3)
PROT SERPL-MCNC: 6.7 G/DL — SIGNIFICANT CHANGE UP (ref 6–8.3)
PROT UR-MCNC: 100 MG/DL
PROTHROM AB SERPL-ACNC: 16.6 SEC — HIGH (ref 10.5–13.4)
RBC # BLD: 5.54 M/UL — SIGNIFICANT CHANGE UP (ref 4.2–5.8)
RBC # FLD: 16.2 % — HIGH (ref 10.3–14.5)
RBC BLD AUTO: ABNORMAL
RBC CASTS # UR COMP ASSIST: ABNORMAL /HPF (ref 0–2)
SARS-COV-2 RNA SPEC QL NAA+PROBE: SIGNIFICANT CHANGE UP
SODIUM SERPL-SCNC: 131 MMOL/L — LOW (ref 135–145)
SODIUM SERPL-SCNC: 131 MMOL/L — LOW (ref 135–145)
SP GR SPEC: 1.02 — SIGNIFICANT CHANGE UP (ref 1.01–1.02)
SPHEROCYTES BLD QL SMEAR: SLIGHT — SIGNIFICANT CHANGE UP
UROBILINOGEN FLD QL: 1 MG/DL
WBC # BLD: 24.34 K/UL — HIGH (ref 3.8–10.5)
WBC # FLD AUTO: 24.34 K/UL — HIGH (ref 3.8–10.5)
WBC UR QL: ABNORMAL /HPF (ref 0–5)

## 2023-04-04 PROCEDURE — 99285 EMERGENCY DEPT VISIT HI MDM: CPT

## 2023-04-04 PROCEDURE — 71045 X-RAY EXAM CHEST 1 VIEW: CPT | Mod: 26

## 2023-04-04 RX ORDER — LATANOPROST 0.05 MG/ML
1 SOLUTION/ DROPS OPHTHALMIC; TOPICAL AT BEDTIME
Refills: 0 | Status: DISCONTINUED | OUTPATIENT
Start: 2023-04-04 | End: 2023-04-10

## 2023-04-04 RX ORDER — FAMOTIDINE 10 MG/ML
20 INJECTION INTRAVENOUS DAILY
Refills: 0 | Status: DISCONTINUED | OUTPATIENT
Start: 2023-04-04 | End: 2023-04-10

## 2023-04-04 RX ORDER — LATANOPROST 0.05 MG/ML
1 SOLUTION/ DROPS OPHTHALMIC; TOPICAL
Refills: 0 | DISCHARGE

## 2023-04-04 RX ORDER — FERROUS SULFATE 325(65) MG
1 TABLET ORAL
Refills: 0 | DISCHARGE

## 2023-04-04 RX ORDER — FAMOTIDINE 10 MG/ML
1 INJECTION INTRAVENOUS
Refills: 0 | DISCHARGE

## 2023-04-04 RX ORDER — HEPARIN SODIUM 5000 [USP'U]/ML
5000 INJECTION INTRAVENOUS; SUBCUTANEOUS EVERY 8 HOURS
Refills: 0 | Status: DISCONTINUED | OUTPATIENT
Start: 2023-04-04 | End: 2023-04-05

## 2023-04-04 RX ORDER — ONDANSETRON 8 MG/1
4 TABLET, FILM COATED ORAL EVERY 8 HOURS
Refills: 0 | Status: DISCONTINUED | OUTPATIENT
Start: 2023-04-04 | End: 2023-04-10

## 2023-04-04 RX ORDER — FERROUS SULFATE 325(65) MG
325 TABLET ORAL DAILY
Refills: 0 | Status: DISCONTINUED | OUTPATIENT
Start: 2023-04-04 | End: 2023-04-10

## 2023-04-04 RX ORDER — ACETAMINOPHEN 500 MG
650 TABLET ORAL ONCE
Refills: 0 | Status: COMPLETED | OUTPATIENT
Start: 2023-04-04 | End: 2023-04-04

## 2023-04-04 RX ORDER — ACETAMINOPHEN 500 MG
650 TABLET ORAL EVERY 6 HOURS
Refills: 0 | Status: DISCONTINUED | OUTPATIENT
Start: 2023-04-04 | End: 2023-04-10

## 2023-04-04 RX ORDER — CHLORPROMAZINE HCL 10 MG
50 TABLET ORAL THREE TIMES A DAY
Refills: 0 | Status: DISCONTINUED | OUTPATIENT
Start: 2023-04-04 | End: 2023-04-09

## 2023-04-04 RX ORDER — SENNA PLUS 8.6 MG/1
2 TABLET ORAL AT BEDTIME
Refills: 0 | Status: DISCONTINUED | OUTPATIENT
Start: 2023-04-04 | End: 2023-04-10

## 2023-04-04 RX ORDER — SODIUM CHLORIDE 9 MG/ML
500 INJECTION INTRAMUSCULAR; INTRAVENOUS; SUBCUTANEOUS ONCE
Refills: 0 | Status: COMPLETED | OUTPATIENT
Start: 2023-04-04 | End: 2023-04-05

## 2023-04-04 RX ORDER — SODIUM CHLORIDE 9 MG/ML
250 INJECTION INTRAMUSCULAR; INTRAVENOUS; SUBCUTANEOUS ONCE
Refills: 0 | Status: COMPLETED | OUTPATIENT
Start: 2023-04-04 | End: 2023-04-04

## 2023-04-04 RX ORDER — METOPROLOL TARTRATE 50 MG
25 TABLET ORAL
Refills: 0 | Status: DISCONTINUED | OUTPATIENT
Start: 2023-04-04 | End: 2023-04-07

## 2023-04-04 RX ORDER — LANOLIN ALCOHOL/MO/W.PET/CERES
3 CREAM (GRAM) TOPICAL AT BEDTIME
Refills: 0 | Status: DISCONTINUED | OUTPATIENT
Start: 2023-04-04 | End: 2023-04-10

## 2023-04-04 RX ORDER — CEFTRIAXONE 500 MG/1
1000 INJECTION, POWDER, FOR SOLUTION INTRAMUSCULAR; INTRAVENOUS ONCE
Refills: 0 | Status: COMPLETED | OUTPATIENT
Start: 2023-04-04 | End: 2023-04-04

## 2023-04-04 RX ORDER — CLOPIDOGREL BISULFATE 75 MG/1
75 TABLET, FILM COATED ORAL DAILY
Refills: 0 | Status: DISCONTINUED | OUTPATIENT
Start: 2023-04-04 | End: 2023-04-10

## 2023-04-04 RX ORDER — SOD,AMMONIUM,POTASSIUM LACTATE
1 CREAM (GRAM) TOPICAL
Refills: 0 | Status: DISCONTINUED | OUTPATIENT
Start: 2023-04-04 | End: 2023-04-10

## 2023-04-04 RX ORDER — SOD,AMMONIUM,POTASSIUM LACTATE
1 CREAM (GRAM) TOPICAL
Refills: 0 | DISCHARGE

## 2023-04-04 RX ORDER — CEFTRIAXONE 500 MG/1
1000 INJECTION, POWDER, FOR SOLUTION INTRAMUSCULAR; INTRAVENOUS EVERY 24 HOURS
Refills: 0 | Status: DISCONTINUED | OUTPATIENT
Start: 2023-04-04 | End: 2023-04-10

## 2023-04-04 RX ORDER — CHOLECALCIFEROL (VITAMIN D3) 125 MCG
1 CAPSULE ORAL
Refills: 0 | DISCHARGE

## 2023-04-04 RX ORDER — SIMVASTATIN 20 MG/1
10 TABLET, FILM COATED ORAL AT BEDTIME
Refills: 0 | Status: DISCONTINUED | OUTPATIENT
Start: 2023-04-04 | End: 2023-04-10

## 2023-04-04 RX ADMIN — Medication 650 MILLIGRAM(S): at 17:25

## 2023-04-04 RX ADMIN — SODIUM CHLORIDE 500 MILLILITER(S): 9 INJECTION INTRAMUSCULAR; INTRAVENOUS; SUBCUTANEOUS at 17:00

## 2023-04-04 RX ADMIN — Medication 25 MILLIGRAM(S): at 17:39

## 2023-04-04 RX ADMIN — Medication 650 MILLIGRAM(S): at 16:37

## 2023-04-04 RX ADMIN — SENNA PLUS 2 TABLET(S): 8.6 TABLET ORAL at 22:31

## 2023-04-04 RX ADMIN — HEPARIN SODIUM 5000 UNIT(S): 5000 INJECTION INTRAVENOUS; SUBCUTANEOUS at 22:31

## 2023-04-04 RX ADMIN — CEFTRIAXONE 100 MILLIGRAM(S): 500 INJECTION, POWDER, FOR SOLUTION INTRAMUSCULAR; INTRAVENOUS at 16:05

## 2023-04-04 RX ADMIN — SIMVASTATIN 10 MILLIGRAM(S): 20 TABLET, FILM COATED ORAL at 22:31

## 2023-04-04 NOTE — ED ADULT TRIAGE NOTE - RESPIRATORY RATE (BREATHS/MIN)
PED DISCHARGE SUMMARY      Patient: Paige Padilla MRN: [id number]  SSN: [id number]    YOB: 2022  Age: 2 wk.o. Sex: male      Admitting Diagnosis:  sepsis [facility]) [P36.9]    Discharge Diagnosis: Active Problems:    RSV bronchiolitis (2022)      Acute respiratory failure ([address] (2022)        Primary Care Physician: Hoa Valerio DO    HPI: This is a 11 days former term C/S without significant PMH presenting for 2-3 days of URI Sx with cough congestion (including most of family) but also decreased PO and retractions with increased WOB starting am befroe presentation to ED. Fever to 101 rectally at home this morning. Course in the ED: ED obtained sepsis workup with CSF/urine/blood given dose of amp/       Admission Labs:      Latest Reference Range & Units 22 10:10   WBC 8.0 - 15.4 K/uL 10.6   NRBC 0.1 - 8.3  WBC 0.0 (L)   RBC 4.10 - 5.55 M/uL 3.70 (L)   HGB 13.9 - 19.1 g/dL 12.2 (L)   HCT 39.8 - 53.6 % 35.6 (L)   MCV 91.3 - 103.1 FL 96.2   MCH 31.3 - 35.6 PG 33.0   MCHC 33.0 - 35.7 g/dL 34.3   RDW 14.8 - 17.0 % 15.4   PLATELET 798 - 936 K/uL 452 (H)   MPV 10.2 - 11.9 FL 11.3   NEUTROPHILS 20 - 46 % 63 (H)   BAND NEUTROPHILS 0 - 18 % 0   LYMPHOCYTES 34 - 68 % 23 (L)   MONOCYTES 7 - 20 % 14   EOSINOPHILS 0 - 5 % 0   BASOPHILS 0 - 1 % 0   IMMATURE GRANULOCYTES % 0   METAMYELOCYTES 0 % 0   MYELOCYTES 0 % 0   PROMYELOCYTES 0 % 0   BLASTS 0 % 0   OTHER CELL 0   0   DF -   MANUAL   ABSOLUTE NRBC 0.06 - 1.30 K/uL 0.00 (L)   ABS. NEUTROPHILS 1.6 - 6.1 K/UL 6.7 (H)   ABS. IMM. GRANS. K/UL 0.0   ABS. LYMPHOCYTES 2.1 - 7.5 K/UL 2.4   ABS. MONOCYTES 0.5 - 1.8 K/UL 1.5   ABS. EOSINOPHILS 0.1 - 0.7 K/UL 0.0 (L)   ABS. BASOPHILS 0.0 - 0.1 K/UL 0.0   RBC COMMENTS -   ANISOCYTOSIS  1+     Color -   DARK YELLOW   Appearance CLEAR   CLEAR   Specific gravity 1.003 - 1.030   1.020   pH (UA) 5.0 - 8.0   6.0   Protein NEG mg/dL TRACE !    Glucose NEG mg/dL Negative   Ketone NEG mg/dL Negative   Blood NEG   Negative   Bilirubin UA, confirm NEG   Positive ! Urobilinogen 0.2 - 1.0 EU/dL 0.2   Nitrites NEG   Negative   Leukocyte Esterase NEG   Negative   Epithelial cells FEW /lpf FEW   WBC 0 - 4 /hpf 0-4   RBC 0 - 5 /hpf 0-5   Bacteria NEG /hpf Negative   Sodium 132 - 142 mmol/L 138   Potassium 3.5 - 5.1 mmol/L 4.9   Chloride 97 - 108 mmol/L 110 (H)   CO2 16 - 27 mmol/L 22   Anion gap 5 - 15 mmol/L 6   Glucose 54 - 117 mg/dL 86   BUN 6 - 20 MG/DL 2 (L)   Creatinine 0.20 - 0.60 MG/DL 0.34   BUN/Creatinine ratio 12 - 20   6 (L)   Calcium 8.8 - 10.8 MG/DL 9.8   GFR est non-AA >60 ml/min/1.73m2 Cannot be calculated   GFR est AA >60 ml/min/1.73m2 Cannot be calculated   Bilirubin, total MG/DL 10.3   Protein, total 4.6 - 7.0 g/dL 6.4   Albumin 2.7 - 4.3 g/dL 2.9   Globulin 2.0 - 4.0 g/dL 3.5   A-G Ratio 1.1 - 2.2   0.8 (L)   ALT 12 - 78 U/L 13   AST 20 - 60 U/L 25   Alk. phosphatase 100 - 370 U/L 186   C-Reactive protein 0.00 - 0.60 mg/dL 1.27 (H)   Procalcitonin ng/mL 0.62   (L): Data is abnormally low  (H): Data is abnormally high  !: Data is abnormal     Latest Reference Range & Units 9/19/22 10:30   CSF TUBE NO. -   2   CSF APPEARANCE CLEAR   CLEAR   CSF COLOR COL   CLEAR ! SPUN COLOR COL   CLEAR !    CSF RBCs 0 /cu mm 1,498 (H)   CSF WBCs 0 - 5 /cu mm 3   Tube No. -   2   Protein,CSF 15 - 45 MG/DL 51 (H)   Tube No. -   2   Glucose,CSF 40 - 70 MG/DL 54   !: Data is abnormal  (H): Data is abnormally high    Adenovirus NOTD   Not detected    Coronavirus 229E NOTD   Not detected    Coronavirus HKU1 NOTD   Not detected    Coronavirus CVNL63 NOTD   Not detected    Coronavirus OC43 NOTD   Not detected    SARS-CoV-2, PCR NOTD   Not detected    Metapneumovirus NOTD   Not detected    Rhinovirus and Enterovirus NOTD   Not detected    Influenza A NOTD   Not detected    Influenza A, subtype H1 NOTD   Not detected    Influenza A, subtype H3 NOTD   Not detected    INFLUENZA A H1N1 PCR NOTD   Not detected Influenza B NOTD   Not detected    Parainfluenza 1 NOTD   Not detected    Parainfluenza 2 NOTD   Not detected    Parainfluenza 3 NOTD   Not detected    Parainfluenza virus 4 NOTD   Not detected    RSV by PCR NOTD   Detected Panic     Comment: CALLED TO AND READ BACK BY   NAZ MATA AT 9323 ON 9.19.22 RB    B. parapertussis, PCR NOTD   Not detected    Bordetella pertussis - PCR NOTD   Not detected    Chlamydophila pneumoniae DNA, QL, PCR NOTD   Not detected    Mycoplasma pneumoniae DNA, QL, PCR NOTD   Not detected    XR ABD (KUB)    Result Date: 2022  Dobbhoff tube terminating in the proximal duodenum. There has been no growth for blood, urine, and CSF  in the last > 48 hours    Treatments on admission included High flow nasal cannula, 48 hours of amp/gent and ND feeds    Hospital Course: Patient was admitted to the PICU and started on HFNC with supplemental oxgyen. ND tube was placed for enteral feedings due to respiratory distress. Patient received 48 hours of amp and gent until culture negative. Patient weaned off all supplemental oxygen and respiratory support. ND tube removed and patient has remained afebrile for > 24 hours prior to discharge. At time of Discharge patient is Afebrile, feeling well, no signs of Respiratory distress, no O2 required, and tolerating oral diet. Discharge Exam:   Visit Vitals  BP 63/36   Pulse 174   Temp 99 °F (37.2 °C)   Resp 50   Ht 0.5 m   Wt 3.585 kg   HC 34 cm   SpO2 98%   BMI 14.34 kg/m²     Gen: Awake, alert, active, WD, WN, NAD  HEENT: NC/AT, AFOF, MMM  Resp: CTA B/L, no W/R/R, no distress  CVS: S1 S2 nl, RRR, no M/G/R, cap refill < 2 seconds, good peripheral pulses  Abd: soft, NT, ND, no HSM  Ext: warm, well perfused, no C/C/E  Neuro: normal tone, moving all extremities grossly non focal    Discharge Condition: good and improved    Discharge Medications: There are no discharge medications for this patient.       Pending Labs: none    Disposition: home in mother's care      Discharge Instructions:   Diet: resume diet as prior to admission  Activity: as tolerated  Please suction nose as needed prior to feedings  Please return to the ED for any increased work of breathing, inability to tolerate oral intake, lethargy or fever. For all other questions, please contact Gerald Yee DO      Total Patient Care Time: > 30 minutes    Follow Up: Follow-up Information       Follow up With Specialties Details Why Contact Info    Greald Yee DO Pediatric Medicine Follow up on 2022 1:50pm [facility]  [address]  [phone]                  On behalf of the [address] [place] [address], thank you for allowing us to care for this patient with you.     Christiano Collins MD 20

## 2023-04-04 NOTE — ED ADULT NURSE NOTE - OBJECTIVE STATEMENT
Received patient from NH awake, alert, confused at times, with incoherent speech noted. As per pt, two episodes of convulsion 2 days ago in NH. Pt denies any chest pain or sob. Received patient from NH awake, alert, confused at times, with incoherent speech noted. As per pt, two episodes of convulsion 2 days ago in NH. Pt denies any chest pain or sob. Left chest pacemaker noted.

## 2023-04-04 NOTE — H&P ADULT - PROBLEM SELECTOR PLAN 7
- Hx of chronic anemia on iron tablet daily  - P/w Hgb 11, unknown baseline  - No sign of bleeding  - Continue to monitor

## 2023-04-04 NOTE — H&P ADULT - NSHPREVIEWOFSYSTEMS_GEN_ALL_CORE
CONSTITUTIONAL: No weight loss. (+)generalized weakness  EYES: No eye pain, visual disturbances, or discharge  ENT:  No difficulty hearing, tinnitus, vertigo; No sinus or throat pain  NECK: No pain or stiffness  RESPIRATORY: No cough, wheezing, or hemoptysis; No Shortness of Breath  CARDIOVASCULAR: No chest pain, palpitations, passing out, dizziness, or leg swelling  GASTROINTESTINAL: No abdominal or epigastric pain. No nausea, vomiting, or hematemesis; (+)Constipation. No melena or hematochezia.  GENITOURINARY: No dysuria, frequency, hematuria, or incontinence  NEUROLOGICAL: No headaches, memory loss, loss of strength, numbness, or tremors  SKIN: No itching, burning, rashes, or lesions   LYMPH Nodes: No enlarged glands  ENDOCRINE: No heat or cold intolerance; No hair loss  MUSCULOSKELETAL: No joint pain or swelling; (+)Chronic lower back pain. No extremity pain  PSYCHIATRIC: No depression, anxiety, mood swings, or difficulty sleeping  HEME/LYMPH: No easy bruising, or bleeding gums  ALLERGY AND IMMUNOLOGIC: No hives or eczema

## 2023-04-04 NOTE — H&P ADULT - NSICDXPASTMEDICALHX_GEN_ALL_CORE_FT
PAST MEDICAL HISTORY:  CHF (congestive heart failure)     GERD (gastroesophageal reflux disease)     HLD (hyperlipidemia)     HTN     Schizophrenia     Vertigo

## 2023-04-04 NOTE — ED PROVIDER NOTE - NSICDXPASTSURGICALHX_GEN_ALL_CORE_FT
PAST SURGICAL HISTORY:  Hydrocele Hydrocele removal 2007    S/P PTCA (Percutaneous Transluminal Coronary Angioplasty)

## 2023-04-04 NOTE — ED PROVIDER NOTE - CLINICAL SUMMARY MEDICAL DECISION MAKING FREE TEXT BOX
74 y/o male w/ weakness and tachycardia. Suspected fever due to infection. Normal neuro exam. Will check labs, chest X-ray, UA, and reassess.

## 2023-04-04 NOTE — H&P ADULT - NSICDXPASTSURGICALHX_GEN_ALL_CORE_FT
PAST SURGICAL HISTORY:  AICD (automatic cardioverter/defibrillator) present     Hydrocele Hydrocele removal 2007    S/P PTCA (Percutaneous Transluminal Coronary Angioplasty)

## 2023-04-04 NOTE — H&P ADULT - PROBLEM SELECTOR PLAN 3
- P/w SCr 1.53 (Unknown baseline)  - [IRMA on CKD] vs. [IRMA] vs. [CKD]  - S/p 500mL NS bolus  - Avoid nephrotoxins for now  - Continue to monitor SCr

## 2023-04-04 NOTE — H&P ADULT - NSHPPHYSICALEXAM_GEN_ALL_CORE
GENERAL: NAD, well-groomed, well-developed  HEAD:  Atraumatic, Normocephalic  EYES: EOMI, PERRLA, conjunctiva and sclera clear  ENMT: No tonsillar erythema, exudates, or enlargement; Moist mucous membranes, No lesions  NECK: Supple, normal appearance, No JVD; Normal thyroid; Trachea midline  NERVOUS SYSTEM:  Alert & Oriented X3,  Motor Strength 5/5 B/L upper and lower extremities, sensation intact  CHEST/LUNG: L upper chest scar from AICD placement. Lungs clear to auscultation bilaterally, No rales, rhonchi, wheezing   HEART: Regular rate and rhythm; No murmurs, rubs, or gallops  ABDOMEN: No CVA tenderness. Soft, Nontender, Nondistended; Bowel sounds present  EXTREMITIES:  2+ Peripheral Pulses, No clubbing, cyanosis, or edema  LYMPH: No lymphadenopathy noted  SKIN: No rashes or lesions;  Good capillary refill

## 2023-04-04 NOTE — ED ADULT NURSE NOTE - CHIEF COMPLAINT QUOTE
SENT FROM [facility] FOR GENERALIZED WEAKNESS AND TACHYCARDIA X 2 DAYS. EMS REPORTS  NS 200CC GIVEN

## 2023-04-04 NOTE — H&P ADULT - PROBLEM SELECTOR PLAN 5
- P/w serum glucose 161  - Possibly postprandial vs due to sepsis vs undiagnosed insulin resistance  - F/u A1c

## 2023-04-04 NOTE — H&P ADULT - PROBLEM SELECTOR PLAN 8
- Home meds Amlodipine 10mg, Metoprolol tartrate 50mg BID  - Will hold Amlodipine in the setting of sepsis  - C/w metoprolol in half the dose, in the setting of sepsis and at the same time to prevent reflex tachycardia

## 2023-04-04 NOTE — ED ADULT TRIAGE NOTE - CHIEF COMPLAINT QUOTE
SENT FROM [facility] FOR GENERALIZED WEAKNESS AND TACHYCARDIA X 2 DAYS. EMS REPORTS  NS 200CC GIVEN

## 2023-04-04 NOTE — H&P ADULT - ASSESSMENT
Patient is a 73 male, uses cane, from [facility] with PMHx of Schizophrenia, CHF, AICD, GERD, HTN, HLD, Vertigo, who was sent from assisted living to be evaluated for generalized weakness and tachycardia. Admitted for severe sepsis due to UTI

## 2023-04-04 NOTE — H&P ADULT - TIME BILLING
- Review of records, telemetry, vital signs and daily labs.   - General and cardiovascular physical examination.  - Generation of cardiovascular treatment plan.  - Coordination of care.      Patient was seen and examined by me on 4/4/23,interim events noted,labs and radiology studies reviewed.  Zane Mistry MD,FACC.  [address] [place], [place] [id number].  [phone]

## 2023-04-04 NOTE — H&P ADULT - PROBLEM SELECTOR PLAN 1
- P/w tachycardia, WBC 24.34, Fever 101.7  - S/p 250mL NS bolus by EMS, 250mL NS bolus in ED  - EKG = atrial-sensed, ventricular paced rhythm  - Lactate 4.4 ->  - UA positive  - F/u UCx, BCx  - Ceftriaxone for now - P/w tachycardia, WBC 24.34, Fever 101.7  - S/p 250mL NS bolus by EMS, 250mL NS bolus in ED  - EKG = atrial-sensed, ventricular paced rhythm  - Lactate 4.4 ->  - UA positive  - F/u UCx, BCx  - Ceftriaxone for now  - ID Dr. Murillo consulted

## 2023-04-04 NOTE — PATIENT PROFILE ADULT - NSPROSPHOSPCHAPLAINYN_GEN_A_NUR
History     Chief Complaint:  Dizziness and Melena      The history is provided by a relative and the patient. The history is limited by a language barrier. A  was used (Patient's son).      Shiraz Ingram is a 85 year old male, who currently takes a daily aspirin and Pradaxa, with a history of hypertension, stage 3 CKD, CAD, and anemia, who presents with dizziness and melena that started today. The patient only uses sign language, so history was obtained through his son, who is present. The patient's son states that he has been weak and has had stomach pain. He currently has a blood clot in his left atrium so he has been taking Pradaxa.  He denies any chest pain or shortness of breath.  Additionally, the patient was seen two ago in the emergency department for vertigo, but states dizziness is worsened.  He has not been vomiting blood.    Allergies:  No Known Drug Allergies    Medications:    Albuterol  ASA  Pulmicort  Pradaxa  Antivert  Singulair  Zocor    Past Medical History:    Acute on chronic CHF  Allergic rhinitis  Anemia  Severe aortic stenosis  Benign neoplasm of oclon  CAD involving native coronary artery of native heart without angina pectoris  CKD stage 3  Coronary artherosclerosis of unspecified type of vessel, native or graft  Hyperlipidemia  Hypertension  Localized osteoarthrosis not specified whether primary or secondary, lower leg  Moderate persistent asthma  Persistent atrial fibrillation hearing loss  Vitamin B 12 deficiency  Non-rheumatic aortic valve stenosis  Osteoporosis    Past Surgical History:    CABG  Angiogram coronary graft  Cardiac surgery  Coronary angiogram  Right heart catheter  Herniorrhaphy inguinal, right  Laparoscopic cholecystectomy    Family History:    Father: CAD  Mother: Cancer  Brother: Heart disease    Social History:  The patient was accompanied to the ED by his son.  Smoking Status: former  Smokeless Tobacco: no  Alcohol Use: no  Drug Use: no  Marital  Status:   [2]    Review of Systems   Gastrointestinal: Positive for blood in stool.   Neurological: Positive for dizziness and weakness.   All other systems reviewed and are negative.    Physical Exam     Patient Vitals for the past 24 hrs:   BP Temp Temp src Pulse Heart Rate Resp SpO2   05/23/20 2335 -- 99.5  F (37.5  C) Oral -- -- -- --   05/23/20 2315 125/67 -- -- 92 92 22 92 %   05/23/20 2300 125/50 99.3  F (37.4  C) -- 87 -- 16 --   05/23/20 2155 -- -- -- -- 91 22 94 %   05/23/20 2154 116/88 -- -- 93 -- 10 96 %   05/23/20 2145 (!) 82/64 -- -- 84 84 17 --   05/23/20 2135 -- -- -- -- 80 24 95 %   05/23/20 2115 119/54 -- -- 89 88 11 --   05/23/20 2032 119/52 96.8  F (36  C) Temporal -- 98 20 100 %       Physical Exam  General: Elderly appearing male.  Weak.  Head:  Scalp is NC/AT  Eyes:  No scleral icterus, PERRL  ENT:  The external nose and ears are normal.   Neck:  Normal range of motion without rigidity.  CV:  Regular rate and rhythm    No pathologic murmur, rubs, or gallops.  Resp:  Breath sounds are clear bilaterally.  No crackles, wheezes, rhonchi, stridor.    Non-labored, no retractions or accessory muscle use  GI:  Abdomen is soft, no distension, diffuse mild lower tenderness, no masses. No peritoneal signs.  Bowel sounds present in all quadrants.  Rectal exam reveals melanotic stool.  No brisk bleeding.  :  No suprapubic or flank tenderness  MS:  No lower extremity edema or asymmetric calf swelling. Normal ROM in all joints without effusions.  Skin:  Warm and dry, No rash or lesions noted. 2+ peripheral pulses in all extremities  Neuro:  Oriented. No gross motor deficits. GCS 15  Psych: Awake. Alert. Normal affect. Appropriate interactions.    Emergency Department Course     ECG:  Indication: dizziness  ECG taken at 2109, ECG read at 2133 by Dr. Cristo MD  Atrial fibrillation with premature ventricular or aberrantly conducted complexes  Right bundle branch block  Bifascicular block  Rate 88 bpm.  NM interval *. QRS duration 1487. QT/QTc 394/476. P-R-T axes * -60 58.    Imaging:  Radiology findings were communicated with the patient and his son who voiced understanding of the findings.    CT abdomen pelvis w/o contrast  1.  Multiple bilateral renal cysts many of which are complicated. These likely containing proteinaceous debris or blood products and are similar to the prior exam.     2.  Cholecystectomy.     Reading per radiology    Laboratory:  Laboratory findings were communicated with the patient and his son who voiced understanding of the findings.    INR: 2.18(H)  CBC: HGB 6.7(LL) o/w within normal limits (WBC 8.1, )  CMP: glucose 189(H), BUN 61(H), creatinine 1.62(H), GFR estimate 38(L), albumin 3.3(L), protein total 6.3(L) o/w within normal limit   ABO/Rh type and screen: ABO A, RH(D) positive, antibody screen negative  Lactic Acid (Resulted 2126): 6.0(HH)    Occult blood stool: positive(A)    Asymptomatic COVID-19 virus (Coronavirus) by PCR: Pending    Interventions:  2144 Protonix 80 mg IV   2159 0.9% NaCl Bolus 1000 mL IV  Emergency Department Course:  Past medical records, nursing notes, and vitals reviewed.    (2040)   I performed an exam of the patient as documented above.     EKG obtained in the ED, see results above.     IV was inserted and blood was drawn for laboratory testing, results above.    (2100)   I finished performing my exam on the patient, as he had to have a bowel movement prior to completion of my initial exam. I also shared service with Dr. Piyush Lemons.     The patient provided a stool sample here in the emergency department. This was sent for laboratory testing, findings above.    The patient was sent for a CT abdomen pelvis w/o contrast while in the emergency department, results above.     (2153)   I spoke with Dr. Shaw of the Gastroenterology service regarding patient's presentation, findings, and plan of care.     (2156)   Spoke with pharmacy about the patient's  medication.     (0483)   I spoke with Dr. Alford of the Hospitalist service regarding patient's presentation, findings, and plan of care, who agrees to accept patient for further care, evaluation and monitoring.     A nasal swab was obtained for laboratory testing, findings above.     Findings and plan explained to the Patient and son who consents to admission. Discussed the patient with Dr. Alford, who will admit the patient to a bed for further monitoring, evaluation, and treatment.    I personally reviewed the laboratory and imaging results with the Patient and son and answered all related questions prior to admission.     Impression & Plan   The Lactic acid level is elevated due to Bleeding and dehydration, at this time there is no sign of severe sepsis or septic shock.    Medical Decision Makin-year-old male who presents with abdominal pain and bloody stools.  History and records reviewed.  Hemoglobin is 6.7 down from just over 8 2 days ago.  Stool appears melanotic and more consistent with upper GI bleed.  No history of liver disease and no concern for variceal bleed.  Lactate is elevated to 6, likely secondary to a combination of bleeding and dehydration.  Afebrile with normal white blood cell count and no infectious symptoms at this time.  No evidence of cardiogenic or obstructive shock.  CT scan of the abdomen and pelvis unremarkable.      Given fluids and 2 units of blood here in the ED as well as Protonix.   Discussed with Dr. Fournier of GI, who will see the patient in the hospital.  Recommends admission to the ICU for close monitoring/endoscopy needed more emergently if patient decompensates.  He is on Pradaxa for left atrial clot, but will hold off on Pradaxa reversal at this time given that blood pressure stable and concern for stroke/thrombosis.  Discussed with hospitalist who agrees to accept the patient to ICU bed.  Patient and family consent to admission and all questions  answered.    Diagnosis:    ICD-10-CM    1. Upper GI bleed  K92.2 ABO/Rh type and screen     Blood component     Blood component     Asymptomatic COVID-19 Virus (Coronavirus) by PCR   2. Anemia due to GI blood loss  D50.0    3. Elevated lactic acid level  R79.89        Disposition:  Admitted to Dr. Alford.    Scribe Disclosure:  I, Maurice Rodriguez, am serving as a scribe at 8:33 PM on 5/23/2020 to document services personally performed by Alonzo Ferris, based on my observations and the provider's statements to me.    5/23/2020    EMERGENCY DEPARTMENT       Alonzo Ferris PA-C  05/24/20 0000     no

## 2023-04-04 NOTE — PATIENT PROFILE ADULT - FALL HARM RISK - HARM RISK INTERVENTIONS

## 2023-04-04 NOTE — ED PROVIDER NOTE - OBJECTIVE STATEMENT
74 y/o male, history of schizophrenia, CHF, presenting w/ generalized weakness and tachycardia today. Patient reports generalized weakness since yesterday. Denies any headache, cough, shortness of breath, or urinary symptoms. No known drug allergies.

## 2023-04-04 NOTE — H&P ADULT - HISTORY OF PRESENT ILLNESS
Patient is a 73 male, uses cane, from [facility] with PMHx of Schizophrenia, CHF, AICD, GERD, HTN, HLD, Vertigo, who was sent from assisted living to be evaluated for generalized weakness and tachycardia. He has been having generalized weakness and tachycardia for 2 days. Patient reporta having dysuria and fever for 1 month. He denies any other symptoms. He endorses chronic constipation(last BM yesterday) and chronic lower back pain. Patient denies headache, nausea, vomit, chest pain, shortness of breath, cough, lightheadedness, abdominal pain, diarrhea, dark/bloody stool, hematuria, loss of strength, or loss of sensation.

## 2023-04-04 NOTE — CHART NOTE - NSCHARTNOTEFT_GEN_A_CORE
EVENT: Repeat labs noted. Lactate downtrended from 3.6--2.3, Na+ remains at 131.     HPI:  73 year old male, uses cane, from [facility] with PMHx of Schizophrenia, CHF, AICD, GERD, HTN, HLD, Vertigo, who was sent from assisted living to be evaluated for generalized weakness and tachycardia. Admitted for severe sepsis due to UTI, incidentally noted with IRMA on CKD, and hyponatremia.     SUBJECTIVE: Pt was seen and examined while in [facility], A&Ox3 with history of Schizophrenia. He appears in NAD; euvolemic on physical exam. Denies shortness of breath.     OBJECTIVE:  Vital Signs Last 24 Hrs  T(C): 37 (04 Apr 2023 23:34), Max: 38.7 (04 Apr 2023 11:49)  T(F): 98.6 (04 Apr 2023 23:34), Max: 101.7 (04 Apr 2023 11:49)  HR: 102 (04 Apr 2023 23:34) (98 - 114)  BP: 143/83 (04 Apr 2023 23:34) (105/71 - 143/83)  BP(mean): 87 (04 Apr 2023 17:43) (87 - 87)  RR: 18 (04 Apr 2023 23:34) (18 - 24)  SpO2: 93% (04 Apr 2023 23:34) (93% - 97%)    Parameters below as of 04 Apr 2023 23:34  Patient On (Oxygen Delivery Method): room air        PHYSICAL EXAM:  Neuro: Awake and alert, oriented to person, place, and time  Cardiovascular: + S1, S2, no murmurs, rubs, or bruits  Respiratory: clear to auscultation bilaterally with good air entry   GI: Abdomen soft, non-tender, bowel sounds present   : Non distended;   Skin: warm and dry; no rash      LABS:                        11.0   24.34 )-----------( 341      ( 04 Apr 2023 13:00 )             34.6     04-04    131<L>  |  103  |  18  ----------------------------<  140<H>  4.0   |  23  |  1.27    Ca    8.7      04 Apr 2023 22:40    TPro  6.7  /  Alb  2.9<L>  /  TBili  0.8  /  DBili  x   /  AST  17  /  ALT  18  /  AlkPhos  61  04-04        EKG:   IMAGING:    ASSESSMENT/PROBLEM: 1. Elevated lactate in the setting of UTI 2. Hyponatremia     PLAN:     -500 ml NS bolus x 1  -Repeat lactate in 6 hours  -Continue current/supportive measures     FOLLOW UP / RESULT:     -Monitor effectiveness of IV bolus  -F/u repeat lactate and labs in AM EVENT: Repeat labs noted. Lactate downtrended from 3.6--2.3, Na+ remains at 131.     HPI:  73 year old male, uses cane, from [facility] with PMHx of Schizophrenia, CHF, AICD, GERD, HTN, HLD, Vertigo, who was sent from assisted living to be evaluated for generalized weakness and tachycardia. Admitted for severe sepsis due to UTI, incidentally noted with IRMA on CKD, and hyponatremia.     SUBJECTIVE: Pt was seen and examined while in [facility], A&Ox3 with history of Schizophrenia. He appears in NAD; euvolemic on physical exam. Denies shortness of breath.     OBJECTIVE:  Vital Signs Last 24 Hrs  T(C): 37 (04 Apr 2023 23:34), Max: 38.7 (04 Apr 2023 11:49)  T(F): 98.6 (04 Apr 2023 23:34), Max: 101.7 (04 Apr 2023 11:49)  HR: 102 (04 Apr 2023 23:34) (98 - 114)  BP: 143/83 (04 Apr 2023 23:34) (105/71 - 143/83)  BP(mean): 87 (04 Apr 2023 17:43) (87 - 87)  RR: 18 (04 Apr 2023 23:34) (18 - 24)  SpO2: 93% (04 Apr 2023 23:34) (93% - 97%)    Parameters below as of 04 Apr 2023 23:34  Patient On (Oxygen Delivery Method): room air        PHYSICAL EXAM:  Neuro: Awake and alert, oriented to person, place, and time  Cardiovascular: + S1, S2, no murmurs, rubs, or bruits  Respiratory: clear to auscultation bilaterally with good air entry   GI: Abdomen soft, non-tender, bowel sounds present   : Non distended;   Skin: warm and dry; no rash      LABS:                        11.0   24.34 )-----------( 341      ( 04 Apr 2023 13:00 )             34.6     04-04    131<L>  |  103  |  18  ----------------------------<  140<H>  4.0   |  23  |  1.27    Ca    8.7      04 Apr 2023 22:40    TPro  6.7  /  Alb  2.9<L>  /  TBili  0.8  /  DBili  x   /  AST  17  /  ALT  18  /  AlkPhos  61  04-04        EKG:   IMAGING:    ASSESSMENT/PROBLEM: 1. Elevated lactate in the setting of UTI 2. Hyponatremia possibly due to poor PO intake     PLAN:     -500 ml NS bolus x 1  -Repeat lactate in 6 hours  -Continue current/supportive measures     FOLLOW UP / RESULT:     -Monitor effectiveness of IV bolus  -F/u repeat lactate and labs in AM EVENT: Repeat labs noted. Lactate downtrended from 3.6-->2.3, Na+ remains at 131.     HPI:  73 year old male, uses cane, from [facility] with PMHx of Schizophrenia, CHF, AICD, GERD, HTN, HLD, Vertigo, who was sent from assisted living to be evaluated for generalized weakness and tachycardia. Admitted for severe sepsis due to UTI, incidentally noted with IRMA on CKD, and hyponatremia.     SUBJECTIVE: Pt was seen and examined while in [facility], A&Ox3 with history of Schizophrenia. He appears in NAD; euvolemic on physical exam. Denies shortness of breath.     OBJECTIVE:  Vital Signs Last 24 Hrs  T(C): 37 (04 Apr 2023 23:34), Max: 38.7 (04 Apr 2023 11:49)  T(F): 98.6 (04 Apr 2023 23:34), Max: 101.7 (04 Apr 2023 11:49)  HR: 102 (04 Apr 2023 23:34) (98 - 114)  BP: 143/83 (04 Apr 2023 23:34) (105/71 - 143/83)  BP(mean): 87 (04 Apr 2023 17:43) (87 - 87)  RR: 18 (04 Apr 2023 23:34) (18 - 24)  SpO2: 93% (04 Apr 2023 23:34) (93% - 97%)    Parameters below as of 04 Apr 2023 23:34  Patient On (Oxygen Delivery Method): room air        PHYSICAL EXAM:  Neuro: Awake and alert, oriented to person, place, and time  Cardiovascular: + S1, S2, no murmurs, rubs, or bruits  Respiratory: clear to auscultation bilaterally with good air entry   GI: Abdomen soft, non-tender, bowel sounds present   : Non distended;   Skin: warm and dry; no rash      LABS:                        11.0   24.34 )-----------( 341      ( 04 Apr 2023 13:00 )             34.6     04-04    131<L>  |  103  |  18  ----------------------------<  140<H>  4.0   |  23  |  1.27    Ca    8.7      04 Apr 2023 22:40    TPro  6.7  /  Alb  2.9<L>  /  TBili  0.8  /  DBili  x   /  AST  17  /  ALT  18  /  AlkPhos  61  04-04        EKG:   IMAGING:    ASSESSMENT/PROBLEM: 1. Elevated lactate in the setting of UTI 2. Hyponatremia possibly due to poor PO intake     PLAN:     -500 ml NS bolus x 1  -Repeat lactate in 6 hours  -Continue current/supportive measures     FOLLOW UP / RESULT:     -Monitor effectiveness of IV bolus  -F/u repeat lactate and labs in AM

## 2023-04-05 DIAGNOSIS — N17.9 ACUTE KIDNEY FAILURE, UNSPECIFIED: ICD-10-CM

## 2023-04-05 DIAGNOSIS — J96.01 ACUTE RESPIRATORY FAILURE WITH HYPOXIA: ICD-10-CM

## 2023-04-05 DIAGNOSIS — Z02.9 ENCOUNTER FOR ADMINISTRATIVE EXAMINATIONS, UNSPECIFIED: ICD-10-CM

## 2023-04-05 LAB
A1C WITH ESTIMATED AVERAGE GLUCOSE RESULT: 5.6 % — SIGNIFICANT CHANGE UP (ref 4–5.6)
ALBUMIN SERPL ELPH-MCNC: 2.7 G/DL — LOW (ref 3.5–5)
ALP SERPL-CCNC: 62 U/L — SIGNIFICANT CHANGE UP (ref 40–120)
ALT FLD-CCNC: 16 U/L DA — SIGNIFICANT CHANGE UP (ref 10–60)
ANION GAP SERPL CALC-SCNC: 5 MMOL/L — SIGNIFICANT CHANGE UP (ref 5–17)
AST SERPL-CCNC: 19 U/L — SIGNIFICANT CHANGE UP (ref 10–40)
BILIRUB SERPL-MCNC: 0.8 MG/DL — SIGNIFICANT CHANGE UP (ref 0.2–1.2)
BUN SERPL-MCNC: 17 MG/DL — SIGNIFICANT CHANGE UP (ref 7–18)
CALCIUM SERPL-MCNC: 8.5 MG/DL — SIGNIFICANT CHANGE UP (ref 8.4–10.5)
CHLORIDE SERPL-SCNC: 104 MMOL/L — SIGNIFICANT CHANGE UP (ref 96–108)
CO2 SERPL-SCNC: 23 MMOL/L — SIGNIFICANT CHANGE UP (ref 22–31)
CREAT SERPL-MCNC: 1.2 MG/DL — SIGNIFICANT CHANGE UP (ref 0.5–1.3)
EGFR: 64 ML/MIN/1.73M2 — SIGNIFICANT CHANGE UP
ESTIMATED AVERAGE GLUCOSE: 114 MG/DL — SIGNIFICANT CHANGE UP (ref 68–114)
GLUCOSE SERPL-MCNC: 144 MG/DL — HIGH (ref 70–99)
HCT VFR BLD CALC: 32.7 % — LOW (ref 39–50)
HCV AB S/CO SERPL IA: 0.06 S/CO — SIGNIFICANT CHANGE UP (ref 0–0.99)
HCV AB SERPL-IMP: SIGNIFICANT CHANGE UP
HGB BLD-MCNC: 10.5 G/DL — LOW (ref 13–17)
LACTATE SERPL-SCNC: 2.1 MMOL/L — HIGH (ref 0.7–2)
LACTATE SERPL-SCNC: 2.3 MMOL/L — HIGH (ref 0.7–2)
MAGNESIUM SERPL-MCNC: 1.9 MG/DL — SIGNIFICANT CHANGE UP (ref 1.6–2.6)
MCHC RBC-ENTMCNC: 19.7 PG — LOW (ref 27–34)
MCHC RBC-ENTMCNC: 32.1 GM/DL — SIGNIFICANT CHANGE UP (ref 32–36)
MCV RBC AUTO: 61.4 FL — LOW (ref 80–100)
NRBC # BLD: 0 /100 WBCS — SIGNIFICANT CHANGE UP (ref 0–0)
PHOSPHATE SERPL-MCNC: 1.8 MG/DL — LOW (ref 2.5–4.5)
PLATELET # BLD AUTO: 284 K/UL — SIGNIFICANT CHANGE UP (ref 150–400)
POTASSIUM SERPL-MCNC: 3.8 MMOL/L — SIGNIFICANT CHANGE UP (ref 3.5–5.3)
POTASSIUM SERPL-SCNC: 3.8 MMOL/L — SIGNIFICANT CHANGE UP (ref 3.5–5.3)
PROT SERPL-MCNC: 6.2 G/DL — SIGNIFICANT CHANGE UP (ref 6–8.3)
RBC # BLD: 5.33 M/UL — SIGNIFICANT CHANGE UP (ref 4.2–5.8)
RBC # FLD: 15.8 % — HIGH (ref 10.3–14.5)
SODIUM SERPL-SCNC: 132 MMOL/L — LOW (ref 135–145)
WBC # BLD: 18.75 K/UL — HIGH (ref 3.8–10.5)
WBC # FLD AUTO: 18.75 K/UL — HIGH (ref 3.8–10.5)

## 2023-04-05 RX ORDER — ENOXAPARIN SODIUM 100 MG/ML
100 INJECTION SUBCUTANEOUS EVERY 12 HOURS
Refills: 0 | Status: DISCONTINUED | OUTPATIENT
Start: 2023-04-05 | End: 2023-04-06

## 2023-04-05 RX ORDER — SODIUM CHLORIDE 9 MG/ML
250 INJECTION INTRAMUSCULAR; INTRAVENOUS; SUBCUTANEOUS ONCE
Refills: 0 | Status: COMPLETED | OUTPATIENT
Start: 2023-04-05 | End: 2023-04-05

## 2023-04-05 RX ORDER — POLYETHYLENE GLYCOL 3350 17 G/17G
17 POWDER, FOR SOLUTION ORAL
Refills: 0 | Status: DISCONTINUED | OUTPATIENT
Start: 2023-04-05 | End: 2023-04-10

## 2023-04-05 RX ADMIN — CEFTRIAXONE 100 MILLIGRAM(S): 500 INJECTION, POWDER, FOR SOLUTION INTRAMUSCULAR; INTRAVENOUS at 00:25

## 2023-04-05 RX ADMIN — SODIUM CHLORIDE 500 MILLILITER(S): 9 INJECTION INTRAMUSCULAR; INTRAVENOUS; SUBCUTANEOUS at 00:07

## 2023-04-05 RX ADMIN — Medication 50 MILLIGRAM(S): at 14:04

## 2023-04-05 RX ADMIN — HEPARIN SODIUM 5000 UNIT(S): 5000 INJECTION INTRAVENOUS; SUBCUTANEOUS at 05:41

## 2023-04-05 RX ADMIN — Medication 1 APPLICATION(S): at 00:24

## 2023-04-05 RX ADMIN — Medication 50 MILLIGRAM(S): at 23:00

## 2023-04-05 RX ADMIN — SODIUM CHLORIDE 125 MILLILITER(S): 9 INJECTION INTRAMUSCULAR; INTRAVENOUS; SUBCUTANEOUS at 16:31

## 2023-04-05 RX ADMIN — CEFTRIAXONE 100 MILLIGRAM(S): 500 INJECTION, POWDER, FOR SOLUTION INTRAMUSCULAR; INTRAVENOUS at 23:00

## 2023-04-05 RX ADMIN — LATANOPROST 1 DROP(S): 0.05 SOLUTION/ DROPS OPHTHALMIC; TOPICAL at 00:25

## 2023-04-05 RX ADMIN — Medication 25 MILLIGRAM(S): at 05:41

## 2023-04-05 RX ADMIN — Medication 50 MILLIGRAM(S): at 05:41

## 2023-04-05 RX ADMIN — HEPARIN SODIUM 5000 UNIT(S): 5000 INJECTION INTRAVENOUS; SUBCUTANEOUS at 14:05

## 2023-04-05 RX ADMIN — Medication 50 MILLIGRAM(S): at 00:25

## 2023-04-05 NOTE — PROGRESS NOTE ADULT - SUBJECTIVE AND OBJECTIVE BOX
NP Note discussed with primary attending.      Patient is a 73y old  Male who presents with a chief complaint of Severe sepsis due to UTI (2023 16:44)      INTERVAL HPI/OVERNIGHT EVENTS:     MEDICATIONS  (STANDING):  ammonium lactate 12% Lotion 1 Application(s) Topical <User Schedule>  cefTRIAXone   IVPB 1000 milliGRAM(s) IV Intermittent every 24 hours  chlorproMAZINE    Tablet 50 milliGRAM(s) Oral three times a day  clopidogrel Tablet 75 milliGRAM(s) Oral daily  famotidine    Tablet 20 milliGRAM(s) Oral daily  ferrous    sulfate 325 milliGRAM(s) Oral daily  heparin   Injectable 5000 Unit(s) SubCutaneous every 8 hours  latanoprost 0.005% Ophthalmic Solution 1 Drop(s) Both EYES at bedtime  metoprolol tartrate 25 milliGRAM(s) Oral two times a day  senna 2 Tablet(s) Oral at bedtime  simvastatin 10 milliGRAM(s) Oral at bedtime    MEDICATIONS  (PRN):  acetaminophen     Tablet .. 650 milliGRAM(s) Oral every 6 hours PRN Temp greater or equal to 38C (100.4F), Mild Pain (1 - 3)  melatonin 3 milliGRAM(s) Oral at bedtime PRN Insomnia  ondansetron Injectable 4 milliGRAM(s) IV Push every 8 hours PRN Nausea and/or Vomiting      __________________________________________________  REVIEW OF SYSTEMS:    CONSTITUTIONAL: No fever,   EYES: no acute visual disturbances  NECK: No pain or stiffness  RESPIRATORY: No cough; No shortness of breath  CARDIOVASCULAR: No chest pain, no palpitations  GASTROINTESTINAL: Constipation x 2-3 days.  (+) flatus. No pain. No nausea or vomiting;   NEUROLOGICAL: No headache or numbness, no tremors  MUSCULOSKELETAL: No joint pain, no muscle pain  GENITOURINARY: no dysuria, no frequency, no hesitancy  PSYCHIATRY: no depression , no anxiety  ALL OTHER  ROS negative        Vital Signs Last 24 Hrs  T(C): 37.6 (2023 04:54), Max: 37.6 (2023 04:54)  T(F): 99.7 (2023 04:54), Max: 99.7 (2023 04:54)  HR: 112 (2023 04:54) (98 - 112)  BP: 124/65 (2023 04:54) (105/71 - 143/83)  BP(mean): 87 (2023 17:43) (87 - 87)  RR: 18 (2023 04:54) (18 - 24)  SpO2: 93% (2023 04:54) (93% - 97%)    Parameters below as of 2023 04:54  Patient On (Oxygen Delivery Method): room air        ________________________________________________  PHYSICAL EXAM:  GENERAL: NAD  HEENT: Normocephalic;  conjunctivae and sclerae clear; moist mucous membranes;   NECK : supple  CHEST/LUNG: Clear to auscultation bilaterally with good air entry   HEART: S1 S2  regular; no murmurs, gallops or rubs  ABDOMEN: Soft, Nontender, Nondistended; Bowel sounds present  EXTREMITIES: no cyanosis; no edema; no calf tenderness  SKIN: warm and dry; no rash  NERVOUS SYSTEM:  Awake and alert; no new deficits    _________________________________________________  LABS:                        10.5   18.75 )-----------( 284      ( 2023 05:48 )             32.7     04-05    132<L>  |  104  |  17  ----------------------------<  144<H>  3.8   |  23  |  1.20    Ca    8.5      2023 05:48  Phos  1.8     04-05  Mg     1.9     04-05    TPro  6.2  /  Alb  2.7<L>  /  TBili  0.8  /  DBili  x   /  AST  19  /  ALT  16  /  AlkPhos  62  04-05    PT/INR - ( 2023 13:00 )   PT: 16.6 sec;   INR: 1.39 ratio         PTT - ( 2023 13:00 )  PTT:28.9 sec  Urinalysis Basic - ( 2023 14:35 )    Color: Yellow / Appearance: very cloudy / S.020 / pH: x  Gluc: x / Ketone: Trace  / Bili: Negative / Urobili: 1 mg/dL   Blood: x / Protein: 100 mg/dL / Nitrite: Positive   Leuk Esterase: Moderate / RBC: 5-10 /HPF / WBC 26-50 /HPF   Sq Epi: x / Non Sq Epi: x / Bacteria: Many /HPF      CAPILLARY BLOOD GLUCOSE            RADIOLOGY & ADDITIONAL TESTS:        Consultant(s) Notes Reviewed:   YES/ No    Care Discussed with Consultants :     Plan of care was discussed with patient and /or primary care giver; all questions and concerns were addressed and care was aligned with patient's wishes.     NP Note discussed with primary attending.      Patient is a 73y old  Male who presents with a chief complaint of Severe sepsis due to UTI (2023 16:44)      INTERVAL HPI/OVERNIGHT EVENTS: Febrile overnight, Tmax 101.6F.   Pt seen and examined this morning. Pt awake/alert, sitting up in bed, endorses feeling a little better, had "convulsion some time ago , had fever. Constipation x 2 - 3 days. (+) flatus. Tolerating po . Pt denies SOB, chest discomfort, N/V/abdominal discomfort.   This afternoon, RN reports pt pulsox 88-89% on RA, increases to 92-91% on O2 2L NC.     MEDICATIONS  (STANDING):  ammonium lactate 12% Lotion 1 Application(s) Topical <User Schedule>  cefTRIAXone   IVPB 1000 milliGRAM(s) IV Intermittent every 24 hours  chlorproMAZINE    Tablet 50 milliGRAM(s) Oral three times a day  clopidogrel Tablet 75 milliGRAM(s) Oral daily  famotidine    Tablet 20 milliGRAM(s) Oral daily  ferrous    sulfate 325 milliGRAM(s) Oral daily  heparin   Injectable 5000 Unit(s) SubCutaneous every 8 hours  latanoprost 0.005% Ophthalmic Solution 1 Drop(s) Both EYES at bedtime  metoprolol tartrate 25 milliGRAM(s) Oral two times a day  senna 2 Tablet(s) Oral at bedtime  simvastatin 10 milliGRAM(s) Oral at bedtime    MEDICATIONS  (PRN):  acetaminophen     Tablet .. 650 milliGRAM(s) Oral every 6 hours PRN Temp greater or equal to 38C (100.4F), Mild Pain (1 - 3)  melatonin 3 milliGRAM(s) Oral at bedtime PRN Insomnia  ondansetron Injectable 4 milliGRAM(s) IV Push every 8 hours PRN Nausea and/or Vomiting      __________________________________________________  REVIEW OF SYSTEMS:    CONSTITUTIONAL: No fever,   EYES: no acute visual disturbances  NECK: No pain or stiffness  RESPIRATORY: No cough; No shortness of breath  CARDIOVASCULAR: No chest pain, no palpitations  GASTROINTESTINAL: Constipation x 2-3 days.  (+) flatus. No pain. No nausea or vomiting;   NEUROLOGICAL: No headache or numbness, no tremors  MUSCULOSKELETAL: No joint pain, no muscle pain  GENITOURINARY: no dysuria, no frequency, no hesitancy  PSYCHIATRY: no depression , no anxiety  ALL OTHER  ROS negative        Vital Signs Last 24 Hrs  T(C): 37.6 (2023 04:54), Max: 37.6 (2023 04:54)  T(F): 99.7 (2023 04:54), Max: 99.7 (2023 04:54)  HR: 112 (2023 04:54) (98 - 112)  BP: 124/65 (2023 04:54) (105/71 - 143/83)  BP(mean): 87 (2023 17:43) (87 - 87)  RR: 18 (2023 04:54) (18 - 24)  SpO2: 93% (2023 04:54) (93% - 97%)    Parameters below as of 2023 04:54  Patient On (Oxygen Delivery Method): room air        ________________________________________________  PHYSICAL EXAM:  GENERAL: NAD  HEENT: Normocephalic;  conjunctivae and sclerae clear; moist mucous membranes;   NECK : supple  CHEST/LUNG: Clear to auscultation bilaterally with good air entry   HEART: S1 S2  regular; no murmurs, gallops or rubs  ABDOMEN: Soft, Nontender, Nondistended; Bowel sounds present  EXTREMITIES: no cyanosis; no edema; no calf tenderness  SKIN: warm and dry; no rash  NERVOUS SYSTEM:  Awake and alert; no new deficits    _________________________________________________  LABS:                        10.5   18.75 )-----------( 284      ( 2023 05:48 )             32.7     04-05    132<L>  |  104  |  17  ----------------------------<  144<H>  3.8   |  23  |  1.20    Ca    8.5      2023 05:48  Phos  1.8     04-05  Mg     1.9     04-05    TPro  6.2  /  Alb  2.7<L>  /  TBili  0.8  /  DBili  x   /  AST  19  /  ALT  16  /  AlkPhos  62  04-05    PT/INR - ( 2023 13:00 )   PT: 16.6 sec;   INR: 1.39 ratio         PTT - ( 2023 13:00 )  PTT:28.9 sec  Urinalysis Basic - ( 2023 14:35 )    Color: Yellow / Appearance: very cloudy / S.020 / pH: x  Gluc: x / Ketone: Trace  / Bili: Negative / Urobili: 1 mg/dL   Blood: x / Protein: 100 mg/dL / Nitrite: Positive   Leuk Esterase: Moderate / RBC: 5-10 /HPF / WBC 26-50 /HPF   Sq Epi: x / Non Sq Epi: x / Bacteria: Many /HPF      CAPILLARY BLOOD GLUCOSE    RADIOLOGY & ADDITIONAL TESTS:  < from: Xray Chest 1 View- PORTABLE-Urgent (23 @ 12:58) >  PROCEDURE DATE:  2023          INTERPRETATION:  AP erect chest on 2023 at 12:28 PM. Patient has   sepsis.    Heart magnified by technique.    Left-sided defibrillator again noted.    Lungs remain clear.    Chest is similar to 2013.    IMPRESSION: No acute finding or change.    < end of copied text >      Consultant(s) Notes Reviewed:   YES    Care Discussed with Consultants :     Plan of care was discussed with patient and /or primary care giver; all questions and concerns were addressed and care was aligned with patient's wishes.

## 2023-04-05 NOTE — CONSULT NOTE ADULT - SUBJECTIVE AND OBJECTIVE BOX
HPI:  Patient is a 73 male, uses cane, from [facility] with PMHx of Schizophrenia, CHF, AICD, GERD, HTN, HLD, Vertigo, who was sent from assisted living to be evaluated for generalized weakness and tachycardia. He has been having generalized weakness and tachycardia for 2 days. Patient reporta having dysuria and fever for 1 month. He denies any other symptoms. He endorses chronic constipation(last BM yesterday) and chronic lower back pain. Patient denies headache, nausea, vomit, chest pain, shortness of breath, cough, lightheadedness, abdominal pain, diarrhea, dark/bloody stool, hematuria, loss of strength, or loss of sensation. (2023 16:44)      PAST MEDICAL & SURGICAL HISTORY:  HTN      Schizophrenia      CHF (congestive heart failure)      HLD (hyperlipidemia)      Vertigo      GERD (gastroesophageal reflux disease)      Hydrocele  Hydrocele removal       S/P PTCA (Percutaneous Transluminal Coronary Angioplasty)      AICD (automatic cardioverter/defibrillator) present          No Known Allergies      Meds:  acetaminophen     Tablet .. 650 milliGRAM(s) Oral every 6 hours PRN  ammonium lactate 12% Lotion 1 Application(s) Topical <User Schedule>  bisacodyl Suppository 10 milliGRAM(s) Rectal daily PRN  cefTRIAXone   IVPB 1000 milliGRAM(s) IV Intermittent every 24 hours  chlorproMAZINE    Tablet 50 milliGRAM(s) Oral three times a day  clopidogrel Tablet 75 milliGRAM(s) Oral daily  enoxaparin Injectable 100 milliGRAM(s) SubCutaneous every 12 hours  famotidine    Tablet 20 milliGRAM(s) Oral daily  ferrous    sulfate 325 milliGRAM(s) Oral daily  latanoprost 0.005% Ophthalmic Solution 1 Drop(s) Both EYES at bedtime  melatonin 3 milliGRAM(s) Oral at bedtime PRN  metoprolol tartrate 25 milliGRAM(s) Oral two times a day  ondansetron Injectable 4 milliGRAM(s) IV Push every 8 hours PRN  polyethylene glycol 3350 17 Gram(s) Oral two times a day  senna 2 Tablet(s) Oral at bedtime  simvastatin 10 milliGRAM(s) Oral at bedtime      SOCIAL HISTORY:  Smoker:  YES / NO        PACK YEARS:                         WHEN QUIT?  ETOH use:  YES / NO               FREQUENCY / QUANTITY:  Ilicit Drug use:  YES / NO  Occupation:  Assisted device use (Cane / Walker):  Live with:    FAMILY HISTORY:  No pertinent family history in first degree relatives        VITALS:  Vital Signs Last 24 Hrs  T(C): 37.7 (2023 13:36), Max: 37.7 (2023 13:36)  T(F): 99.9 (2023 13:36), Max: 99.9 (2023 13:36)  HR: 114 (2023 19:26) (98 - 114)  BP: 131/68 (2023 19:26) (105/71 - 151/91)  BP(mean): --  RR: 18 (2023 13:36) (18 - 22)  SpO2: 91% (2023 19:26) (91% - 96%)    Parameters below as of 2023 19:26  Patient On (Oxygen Delivery Method): nasal cannula  O2 Flow (L/min): 2      LABS/DIAGNOSTIC TESTS:                          10.5   18.75 )-----------( 284      ( 2023 05:48 )             32.7     WBC Count: 18.75 K/uL ( @ 05:48)  WBC Count: 24.34 K/uL ( @ 13:00)      04-05    132<L>  |  104  |  17  ----------------------------<  144<H>  3.8   |  23  |  1.20    Ca    8.5      2023 05:48  Phos  1.8     04-05  Mg     1.9     04-05    TPro  6.2  /  Alb  2.7<L>  /  TBili  0.8  /  DBili  x   /  AST  19  /  ALT  16  /  AlkPhos  62  04-05      Urinalysis Basic - ( 2023 14:35 )    Color: Yellow / Appearance: very cloudy / S.020 / pH: x  Gluc: x / Ketone: Trace  / Bili: Negative / Urobili: 1 mg/dL   Blood: x / Protein: 100 mg/dL / Nitrite: Positive   Leuk Esterase: Moderate / RBC: 5-10 /HPF / WBC 26-50 /HPF   Sq Epi: x / Non Sq Epi: x / Bacteria: Many /HPF        LIVER FUNCTIONS - ( 2023 05:48 )  Alb: 2.7 g/dL / Pro: 6.2 g/dL / ALK PHOS: 62 U/L / ALT: 16 U/L DA / AST: 19 U/L / GGT: x             PT/INR - ( 2023 13:00 )   PT: 16.6 sec;   INR: 1.39 ratio         PTT - ( 2023 13:00 )  PTT:28.9 sec    LACTATE:Lactate, Blood: 2.1 mmol/L ( @ 12:05)  Lactate, Blood: 2.3 mmol/L ( @ 05:48)  Lactate, Blood: 2.3 mmol/L ( @ 22:20)      ABG -     CULTURES:   .Blood Blood-Peripheral   @ 13:10   No growth to date.  --  --      .Blood Blood-Peripheral   @ 13:00   No growth to date.  --  --          RADIOLOGY:< from: Xray Chest 1 View- PORTABLE-Urgent (23 @ 12:58) >  ACC: [id number] EXAM:  XR CHEST PORTABLE URGENT 1V   ORDERED BY: CHRIS MADSEN     PROCEDURE DATE:  2023          INTERPRETATION:  AP erect chest on 2023 at 12:28 PM. Patient has   sepsis.    Heart magnified by technique.    Left-sided defibrillator again noted.    Lungs remain clear.    Chest is similar to 2013.    IMPRESSION: No acute finding or change.    --- End of Report ---            TAMIKO SAAB MD; Attending Radiologist  This document has been electronically signed. 2023  1:08PM    < end of copied text >        ROS  [  ] UNABLE TO ELICIT               HPI:  Patient is a 73 male, uses cane, from [facility] with PMHx of Schizophrenia, CHF, AICD, GERD, HTN, HLD, Vertigo, who was sent from assisted living to be evaluated for generalized weakness and tachycardia. He has been having generalized weakness and tachycardia for 2 days. Patient reported having dysuria and fever for 1 month. He denies any other symptoms. He endorses chronic constipation(last BM yesterday) and chronic lower back pain. Patient denies headache, nausea, vomit, chest pain, shortness of breath, cough, lightheadedness, abdominal pain, diarrhea, dark/bloody stool, hematuria, loss of strength, or loss of sensation. (2023 16:44)        History as above, patient a poor historian and has a very odd affect secondary to his Schizophrenia, he answers questions but limited. He was admitted from an assisted living because he has fevers , and dysuria for several days, he denies any frequency or urgency of urination , no abdominal pain, no nausea or vomiting. He was found to have fevers to 101.7 , an elevated WBC count and a positive u/a.        PAST MEDICAL & SURGICAL HISTORY:  HTN      Schizophrenia      CHF (congestive heart failure)      HLD (hyperlipidemia)      Vertigo      GERD (gastroesophageal reflux disease)      Hydrocele  Hydrocele removal       S/P PTCA (Percutaneous Transluminal Coronary Angioplasty)      AICD (automatic cardioverter/defibrillator) present          No Known Allergies      Meds:  acetaminophen     Tablet .. 650 milliGRAM(s) Oral every 6 hours PRN  ammonium lactate 12% Lotion 1 Application(s) Topical <User Schedule>  bisacodyl Suppository 10 milliGRAM(s) Rectal daily PRN  cefTRIAXone   IVPB 1000 milliGRAM(s) IV Intermittent every 24 hours  chlorproMAZINE    Tablet 50 milliGRAM(s) Oral three times a day  clopidogrel Tablet 75 milliGRAM(s) Oral daily  enoxaparin Injectable 100 milliGRAM(s) SubCutaneous every 12 hours  famotidine    Tablet 20 milliGRAM(s) Oral daily  ferrous    sulfate 325 milliGRAM(s) Oral daily  latanoprost 0.005% Ophthalmic Solution 1 Drop(s) Both EYES at bedtime  melatonin 3 milliGRAM(s) Oral at bedtime PRN  metoprolol tartrate 25 milliGRAM(s) Oral two times a day  ondansetron Injectable 4 milliGRAM(s) IV Push every 8 hours PRN  polyethylene glycol 3350 17 Gram(s) Oral two times a day  senna 2 Tablet(s) Oral at bedtime  simvastatin 10 milliGRAM(s) Oral at bedtime      SOCIAL HISTORY:  Smoker:  denies  ETOH use:  denies  Ilicit Drug use:  denies    FAMILY HISTORY:  No pertinent family history in first degree relatives        VITALS:  Vital Signs Last 24 Hrs  T(C): 37.7 (2023 13:36), Max: 37.7 (2023 13:36)  T(F): 99.9 (2023 13:36), Max: 99.9 (2023 13:36)  HR: 114 (2023 19:26) (98 - 114)  BP: 131/68 (2023 19:26) (105/71 - 151/91)  BP(mean): --  RR: 18 (2023 13:36) (18 - 22)  SpO2: 91% (2023 19:26) (91% - 96%)    Parameters below as of 2023 19:26  Patient On (Oxygen Delivery Method): nasal cannula  O2 Flow (L/min): 2      LABS/DIAGNOSTIC TESTS:                          10.5   18.75 )-----------( 284      ( 2023 05:48 )             32.7     WBC Count: 18.75 K/uL ( @ 05:48)  WBC Count: 24.34 K/uL ( @ 13:00)          132<L>  |  104  |  17  ----------------------------<  144<H>  3.8   |  23  |  1.20    Ca    8.5      2023 05:48  Phos  1.8     04-05  Mg     1.9     04-05    TPro  6.2  /  Alb  2.7<L>  /  TBili  0.8  /  DBili  x   /  AST  19  /  ALT  16  /  AlkPhos  62  04-05      Urinalysis Basic - ( 2023 14:35 )    Color: Yellow / Appearance: very cloudy / S.020 / pH: x  Gluc: x / Ketone: Trace  / Bili: Negative / Urobili: 1 mg/dL   Blood: x / Protein: 100 mg/dL / Nitrite: Positive   Leuk Esterase: Moderate / RBC: 5-10 /HPF / WBC 26-50 /HPF   Sq Epi: x / Non Sq Epi: x / Bacteria: Many /HPF        LIVER FUNCTIONS - ( 2023 05:48 )  Alb: 2.7 g/dL / Pro: 6.2 g/dL / ALK PHOS: 62 U/L / ALT: 16 U/L DA / AST: 19 U/L / GGT: x             PT/INR - ( 2023 13:00 )   PT: 16.6 sec;   INR: 1.39 ratio         PTT - ( 2023 13:00 )  PTT:28.9 sec    LACTATE:Lactate, Blood: 2.1 mmol/L ( @ 12:05)  Lactate, Blood: 2.3 mmol/L ( @ 05:48)  Lactate, Blood: 2.3 mmol/L ( @ 22:20)      ABG -     CULTURES:   .Blood Blood-Peripheral   @ 13:10   No growth to date.  --  --      .Blood Blood-Peripheral   @ 13:00   No growth to date.  --  --          RADIOLOGY:< from: Xray Chest 1 View- PORTABLE-Urgent (23 @ 12:58) >  ACC: [id number] EXAM:  XR CHEST PORTABLE URGENT 1V   ORDERED BY: CHRIS MADSEN     PROCEDURE DATE:  2023          INTERPRETATION:  AP erect chest on 2023 at 12:28 PM. Patient has   sepsis.    Heart magnified by technique.    Left-sided defibrillator again noted.    Lungs remain clear.    Chest is similar to 2013.    IMPRESSION: No acute finding or change.    --- End of Report ---            TAMIKO SAAB MD; Attending Radiologist  This document has been electronically signed. 2023  1:08PM    < end of copied text >        ROS  [  ] UNABLE TO ELICIT, limited

## 2023-04-05 NOTE — PROGRESS NOTE ADULT - SUBJECTIVE AND OBJECTIVE BOX
PATIENT SEEN AND EXAMINED ON :- 4/5/23  DATE OF SERVICE:   4/5/23          Interim events noted,Labs ,Radiological studies and Cardiology tests reviewed .    MR#[id number]  PATIENT NAME:[facility] COURSE: HPI:  Patient is a 73 male, uses cane, from [facility] with PMHx of Schizophrenia, CHF, AICD, GERD, HTN, HLD, Vertigo, who was sent from assisted living to be evaluated for generalized weakness and tachycardia. He has been having generalized weakness and tachycardia for 2 days. Patient reporta having dysuria and fever for 1 month. He denies any other symptoms. He endorses chronic constipation(last BM yesterday) and chronic lower back pain. Patient denies headache, nausea, vomit, chest pain, shortness of breath, cough, lightheadedness, abdominal pain, diarrhea, dark/bloody stool, hematuria, loss of strength, or loss of sensation. (04 Apr 2023 16:44)      INTERIM EVENTS:Patient seen at bedside ,interim events noted.      PMH -reviewed admission note, no change since admission  HEART FAILURE: Acute[ ]Chronic[ ] Systolic[ ] Diastolic[ ] Combined Systolic and Diastolic[ ]  CAD[ ] CABG[ ] PCI[ ]  DEVICES[ ] PPM[ ] ICD[ ] ILR[ ]  ATRIAL FIBRILLATION[ ] Paroxysmal[ ] Permanent[ ] CHADS2-[  ]  IRMA[ ] CKD1[ ] CKD2[ ] CKD3[ ] CKD4[ ] ESRD[ ]  COPD[ ] HTN[ ]   DM[ ] Type1[ ] Type 2[ ]   CVA[ ] Paresis[ ]    AMBULATION: Assisted[ ] Cane/walker[ ] Independent[ ]    MEDICATIONS  (STANDING):  ammonium lactate 12% Lotion 1 Application(s) Topical <User Schedule>  cefTRIAXone   IVPB 1000 milliGRAM(s) IV Intermittent every 24 hours  chlorproMAZINE    Tablet 50 milliGRAM(s) Oral three times a day  clopidogrel Tablet 75 milliGRAM(s) Oral daily  enoxaparin Injectable 100 milliGRAM(s) SubCutaneous every 12 hours  famotidine    Tablet 20 milliGRAM(s) Oral daily  ferrous    sulfate 325 milliGRAM(s) Oral daily  latanoprost 0.005% Ophthalmic Solution 1 Drop(s) Both EYES at bedtime  metoprolol tartrate 25 milliGRAM(s) Oral two times a day  polyethylene glycol 3350 17 Gram(s) Oral two times a day  senna 2 Tablet(s) Oral at bedtime  simvastatin 10 milliGRAM(s) Oral at bedtime    MEDICATIONS  (PRN):  acetaminophen     Tablet .. 650 milliGRAM(s) Oral every 6 hours PRN Temp greater or equal to 38C (100.4F), Mild Pain (1 - 3)  bisacodyl Suppository 10 milliGRAM(s) Rectal daily PRN Constipation  melatonin 3 milliGRAM(s) Oral at bedtime PRN Insomnia  ondansetron Injectable 4 milliGRAM(s) IV Push every 8 hours PRN Nausea and/or Vomiting            REVIEW OF SYSTEMS:  Constitutional: [ ] fever, [ ]weight loss,  [ ]fatigue [ ]weight gain  Eyes: [ ] visual changes  Respiratory: [ ]shortness of breath;  [ ] cough, [ ]wheezing, [ ]chills, [ ]hemoptysis  Cardiovascular: [ ] chest pain, [ ]palpitations, [ ]dizziness,  [ ]leg swelling[ ]orthopnea[ ]PND  Gastrointestinal: [ ] abdominal pain, [ ]nausea, [ ]vomiting,  [ ]diarrhea [ ]Constipation [ ]Melena  Genitourinary: [ ] dysuria, [ ] hematuria [ ]Mcnamara  Neurologic: [ ] headaches [ ] tremors[ ]weakness [ ]Paralysis Right[ ] Left[ ]  Skin: [ ] itching, [ ]burning, [ ] rashes  Endocrine: [ ] heat or cold intolerance  Musculoskeletal: [ ] joint pain or swelling; [ ] muscle, back, or extremity pain  Psychiatric: [ ] depression, [ ]anxiety, [ ]mood swings, or [ ]difficulty sleeping  Hematologic: [ ] easy bruising, [ ] bleeding gums    [ ] All remaining systems negative except as per above.   [ ]Unable to obtain.  [x] No change in ROS since admission      Vital Signs Last 24 Hrs  T(C): 37.7 (05 Apr 2023 13:36), Max: 37.7 (05 Apr 2023 13:36)  T(F): 99.9 (05 Apr 2023 13:36), Max: 99.9 (05 Apr 2023 13:36)  HR: 114 (05 Apr 2023 19:26) (98 - 114)  BP: 131/68 (05 Apr 2023 19:26) (105/71 - 151/91)  BP(mean): --  RR: 18 (05 Apr 2023 13:36) (18 - 22)  SpO2: 91% (05 Apr 2023 19:26) (91% - 96%)    Parameters below as of 05 Apr 2023 19:26  Patient On (Oxygen Delivery Method): nasal cannula  O2 Flow (L/min): 2    I&O's Summary    04 Apr 2023 07:01  -  05 Apr 2023 07:00  --------------------------------------------------------  IN: 0 mL / OUT: 200 mL / NET: -200 mL        PHYSICAL EXAM:  General: No acute distress BMI-  HEENT: EOMI, PERRL  Neck: Supple, [ ] JVD  Lungs: Equal air entry bilaterally; [ ] rales [ ] wheezing [ ] rhonchi  Heart: Regular rate and rhythm; [x ] murmur   2/6 [ x] systolic [ ] diastolic [ ] radiation[ ] rubs [ ]  gallops  Abdomen: Nontender, bowel sounds present  Extremities: No clubbing, cyanosis, [ ] edema [ ]Pulses  equal and intact  Nervous system:  Alert & Oriented X3, no focal deficits  Psychiatric: Normal affect  Skin: No rashes or lesions    LABS:  04-05    132<L>  |  104  |  17  ----------------------------<  144<H>  3.8   |  23  |  1.20    Ca    8.5      05 Apr 2023 05:48  Phos  1.8     04-05  Mg     1.9     04-05    TPro  6.2  /  Alb  2.7<L>  /  TBili  0.8  /  DBili  x   /  AST  19  /  ALT  16  /  AlkPhos  62  04-05    Creatinine Trend: 1.20<--, 1.27<--, 1.53<--                        10.5   18.75 )-----------( 284      ( 05 Apr 2023 05:48 )             32.7     PT/INR - ( 04 Apr 2023 13:00 )   PT: 16.6 sec;   INR: 1.39 ratio         PTT - ( 04 Apr 2023 13:00 )  PTT:28.9 sec

## 2023-04-05 NOTE — CHART NOTE - NSCHARTNOTEFT_GEN_A_CORE
Pt refuses CTA chest to eval for PE despite risk /benefit.     Pt endorses "All I want is get treated for my UTI. I've always had difficulty breathing. ..."      -Blanca Quinn NP Medicine Pt refuses CTA chest to eval for PE despite risk /benefit.     Pt endorses "All I want is get treated for my UTI. I've always had difficulty breathing. ..."  -D/w primary attending, Dr. Mistry.   -Will Start on Lovenox full dose.   -Check Ddimer.   -Echo       -Blanca Quinn NP Medicine

## 2023-04-05 NOTE — PHARMACOTHERAPY INTERVENTION NOTE - COMMENTS
Patient identified by STAR EVETTE LIST for Heart Failure. Pertinent medication were reviewed and no additional interventions at this time.

## 2023-04-06 PROCEDURE — 71275 CT ANGIOGRAPHY CHEST: CPT | Mod: 26

## 2023-04-06 PROCEDURE — 99223 1ST HOSP IP/OBS HIGH 75: CPT

## 2023-04-06 PROCEDURE — 99497 ADVNCD CARE PLAN 30 MIN: CPT

## 2023-04-06 RX ORDER — ENOXAPARIN SODIUM 100 MG/ML
40 INJECTION SUBCUTANEOUS EVERY 24 HOURS
Refills: 0 | Status: DISCONTINUED | OUTPATIENT
Start: 2023-04-06 | End: 2023-04-10

## 2023-04-06 RX ADMIN — Medication 50 MILLIGRAM(S): at 06:35

## 2023-04-06 RX ADMIN — Medication 50 MILLIGRAM(S): at 16:03

## 2023-04-06 RX ADMIN — POLYETHYLENE GLYCOL 3350 17 GRAM(S): 17 POWDER, FOR SOLUTION ORAL at 17:31

## 2023-04-06 RX ADMIN — SENNA PLUS 2 TABLET(S): 8.6 TABLET ORAL at 21:36

## 2023-04-06 RX ADMIN — CEFTRIAXONE 100 MILLIGRAM(S): 500 INJECTION, POWDER, FOR SOLUTION INTRAMUSCULAR; INTRAVENOUS at 23:39

## 2023-04-06 NOTE — CONSULT NOTE ADULT - ASSESSMENT
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
UTI  Fevers  Leukocytosis      Plan - Cont Rocephin 1 gm iv q24hrs  await urine culture results.

## 2023-04-06 NOTE — PROGRESS NOTE ADULT - SUBJECTIVE AND OBJECTIVE BOX
NP Note discussed with primary attending.      Patient is a 73y old  Male who presents with a chief complaint of Severe sepsis due to UTI (2023 20:46)      INTERVAL HPI/OVERNIGHT EVENTS: Pt refusing meds and procedures yesterday.   Pt seen and examined this morning. Pt awake/alert, endorses feeling well, paranoid at times . Pt endorses he will do CTA chest " because I know it will okay for me to find out if anything is wrong...."    MEDICATIONS  (STANDING):  ammonium lactate 12% Lotion 1 Application(s) Topical <User Schedule>  cefTRIAXone   IVPB 1000 milliGRAM(s) IV Intermittent every 24 hours  chlorproMAZINE    Tablet 50 milliGRAM(s) Oral three times a day  clopidogrel Tablet 75 milliGRAM(s) Oral daily  enoxaparin Injectable 100 milliGRAM(s) SubCutaneous every 12 hours  famotidine    Tablet 20 milliGRAM(s) Oral daily  ferrous    sulfate 325 milliGRAM(s) Oral daily  latanoprost 0.005% Ophthalmic Solution 1 Drop(s) Both EYES at bedtime  metoprolol tartrate 25 milliGRAM(s) Oral two times a day  polyethylene glycol 3350 17 Gram(s) Oral two times a day  senna 2 Tablet(s) Oral at bedtime  simvastatin 10 milliGRAM(s) Oral at bedtime    MEDICATIONS  (PRN):  acetaminophen     Tablet .. 650 milliGRAM(s) Oral every 6 hours PRN Temp greater or equal to 38C (100.4F), Mild Pain (1 - 3)  bisacodyl Suppository 10 milliGRAM(s) Rectal daily PRN Constipation  melatonin 3 milliGRAM(s) Oral at bedtime PRN Insomnia  ondansetron Injectable 4 milliGRAM(s) IV Push every 8 hours PRN Nausea and/or Vomiting      __________________________________________________  REVIEW OF SYSTEMS:    CONSTITUTIONAL: No fever,   EYES: no acute visual disturbances  NECK: No pain or stiffness  RESPIRATORY: No cough; No shortness of breath  CARDIOVASCULAR: No chest pain, no palpitations  GASTROINTESTINAL:BM x 1 yesterday.  No pain. No nausea or vomiting; No diarrhea   NEUROLOGICAL: No headache or numbness, no tremors  MUSCULOSKELETAL: No joint pain, no muscle pain  GENITOURINARY: no dysuria, no frequency, no hesitancy  PSYCHIATRY: no depression , no anxiety  ALL OTHER  ROS negative        Vital Signs Last 24 Hrs  T(C): 37.7 (2023 13:36), Max: 37.7 (2023 13:36)  T(F): 99.9 (2023 13:36), Max: 99.9 (2023 13:36)  HR: 114 (2023 19:26) (109 - 114)  BP: 131/68 (2023 19:26) (131/68 - 151/91)  BP(mean): --  RR: 18 (2023 13:36) (18 - 18)  SpO2: 91% (2023 19:26) (91% - 92%)    Parameters below as of 2023 19:26  Patient On (Oxygen Delivery Method): nasal cannula  O2 Flow (L/min): 2      ________________________________________________  PHYSICAL EXAM:  GENERAL: NAD  HEENT: Normocephalic;  conjunctivae and sclerae clear; moist mucous membranes;   NECK : supple  CHEST/LUNG: Clear to auscultation bilaterally with good air entry   HEART: S1 S2  regular; no murmurs, gallops or rubs  ABDOMEN: Soft, Nontender, Nondistended; Bowel sounds present  EXTREMITIES: no cyanosis; no edema; no calf tenderness  SKIN: warm and dry; no rash  NERVOUS SYSTEM:  Awake and alert; no new deficits    _________________________________________________  LABS:                        10.5   18.75 )-----------( 284      ( 2023 05:48 )             32.7     04-05    132<L>  |  104  |  17  ----------------------------<  144<H>  3.8   |  23  |  1.20    Ca    8.5      2023 05:48  Phos  1.8     04-05  Mg     1.9     04-05    TPro  6.2  /  Alb  2.7<L>  /  TBili  0.8  /  DBili  x   /  AST  19  /  ALT  16  /  AlkPhos  62  04-05    PT/INR - ( 2023 13:00 )   PT: 16.6 sec;   INR: 1.39 ratio         PTT - ( 2023 13:00 )  PTT:28.9 sec  Urinalysis Basic - ( 2023 14:35 )    Color: Yellow / Appearance: very cloudy / S.020 / pH: x  Gluc: x / Ketone: Trace  / Bili: Negative / Urobili: 1 mg/dL   Blood: x / Protein: 100 mg/dL / Nitrite: Positive   Leuk Esterase: Moderate / RBC: 5-10 /HPF / WBC 26-50 /HPF   Sq Epi: x / Non Sq Epi: x / Bacteria: Many /HPF      CAPILLARY BLOOD GLUCOSE            RADIOLOGY & ADDITIONAL TESTS:        Consultant(s) Notes Reviewed:   YES/ No    Care Discussed with Consultants :     Plan of care was discussed with patient and /or primary care giver; all questions and concerns were addressed and care was aligned with patient's wishes.     NP Note discussed with primary attending.      Patient is a 73y old  Male who presents with a chief complaint of Severe sepsis due to UTI (2023 20:46)      INTERVAL HPI/OVERNIGHT EVENTS: Pt refusing meds and procedures yesterday.   Pt seen and examined this morning. Pt awake/alert, endorses feeling well, paranoid at times . Pt endorses he will do CTA chest " because I know it will okay for me to find out if anything is wrong...."    MEDICATIONS  (STANDING):  ammonium lactate 12% Lotion 1 Application(s) Topical <User Schedule>  cefTRIAXone   IVPB 1000 milliGRAM(s) IV Intermittent every 24 hours  chlorproMAZINE    Tablet 50 milliGRAM(s) Oral three times a day  clopidogrel Tablet 75 milliGRAM(s) Oral daily  enoxaparin Injectable 100 milliGRAM(s) SubCutaneous every 12 hours  famotidine    Tablet 20 milliGRAM(s) Oral daily  ferrous    sulfate 325 milliGRAM(s) Oral daily  latanoprost 0.005% Ophthalmic Solution 1 Drop(s) Both EYES at bedtime  metoprolol tartrate 25 milliGRAM(s) Oral two times a day  polyethylene glycol 3350 17 Gram(s) Oral two times a day  senna 2 Tablet(s) Oral at bedtime  simvastatin 10 milliGRAM(s) Oral at bedtime    MEDICATIONS  (PRN):  acetaminophen     Tablet .. 650 milliGRAM(s) Oral every 6 hours PRN Temp greater or equal to 38C (100.4F), Mild Pain (1 - 3)  bisacodyl Suppository 10 milliGRAM(s) Rectal daily PRN Constipation  melatonin 3 milliGRAM(s) Oral at bedtime PRN Insomnia  ondansetron Injectable 4 milliGRAM(s) IV Push every 8 hours PRN Nausea and/or Vomiting      __________________________________________________  REVIEW OF SYSTEMS:    CONSTITUTIONAL: No fever,   EYES: no acute visual disturbances  NECK: No pain or stiffness  RESPIRATORY: No cough; No shortness of breath  CARDIOVASCULAR: No chest pain, no palpitations  GASTROINTESTINAL:BM x 1 yesterday.  No pain. No nausea or vomiting; No diarrhea   NEUROLOGICAL: No headache or numbness, no tremors  MUSCULOSKELETAL: No joint pain, no muscle pain  GENITOURINARY: no dysuria, no frequency, no hesitancy  PSYCHIATRY: no depression , no anxiety  ALL OTHER  ROS negative        Vital Signs Last 24 Hrs  T(C): 37.7 (2023 13:36), Max: 37.7 (2023 13:36)  T(F): 99.9 (2023 13:36), Max: 99.9 (2023 13:36)  HR: 114 (2023 19:26) (109 - 114)  BP: 131/68 (2023 19:26) (131/68 - 151/91)  BP(mean): --  RR: 18 (2023 13:36) (18 - 18)  SpO2: 91% (2023 19:26) (91% - 92%)    Parameters below as of 2023 19:26  Patient On (Oxygen Delivery Method): nasal cannula  O2 Flow (L/min): 2      ________________________________________________  PHYSICAL EXAM:  GENERAL: NAD  HEENT: Normocephalic;  conjunctivae and sclerae clear; moist mucous membranes;   NECK : supple  CHEST/LUNG: Clear to auscultation bilaterally with good air entry   HEART: S1 S2  regular; no murmurs, gallops or rubs  ABDOMEN: Soft, Nontender, Nondistended; Bowel sounds present  EXTREMITIES: no cyanosis; no edema; no calf tenderness  SKIN: warm and dry; no rash  NERVOUS SYSTEM:  Awake and alert; no new deficits    _________________________________________________  LABS:      : Pt refused labs        --------                  10.5   18.75 )-----------( 284      ( 2023 05:48 )             32.7     04-05    132<L>  |  104  |  17  ----------------------------<  144<H>  3.8   |  23  |  1.20    Ca    8.5      2023 05:48  Phos  1.8     04-05  Mg     1.9     04-05    TPro  6.2  /  Alb  2.7<L>  /  TBili  0.8  /  DBili  x   /  AST  19  /  ALT  16  /  AlkPhos  62  04-05    PT/INR - ( 2023 13:00 )   PT: 16.6 sec;   INR: 1.39 ratio         PTT - ( 2023 13:00 )  PTT:28.9 sec  Urinalysis Basic - ( 2023 14:35 )    Color: Yellow / Appearance: very cloudy / S.020 / pH: x  Gluc: x / Ketone: Trace  / Bili: Negative / Urobili: 1 mg/dL   Blood: x / Protein: 100 mg/dL / Nitrite: Positive   Leuk Esterase: Moderate / RBC: 5-10 /HPF / WBC 26-50 /HPF   Sq Epi: x / Non Sq Epi: x / Bacteria: Many /HPF      CAPILLARY BLOOD GLUCOSE    RADIOLOGY & ADDITIONAL TESTS:  < from: CT Angio Chest PE Protocol w/ IV Cont (23 @ 10:20) >    COMPARISON: CT chest 2013.    FINDINGS:    PULMONARY ANGIOGRAM:  No pulmonary embolism.    LUNGS/AIRWAYS/PLEURA: Patent trachea and bronchi. Right upper lobe   calcified granuloma. Passive and dependent atelectasis in the lower lobes   and lingula. Trace pleural effusions.    LYMPH NODES/MEDIASTINUM: Unremarkable.    HEART/VASCULATURE: Cardiomegaly. No pericardial effusion. Coronary artery   calcifications. 4 cm midascending aorta. Biventricular ICD.    UPPER ABDOMEN: Unremarkable.    BONES/SOFT TISSUES: Unremarkable.      IMPRESSION:    No pulmonary embolism.    Trace pleural effusions.    < end of copied text >  < from: Xray Chest 1 View- PORTABLE-Urgent (23 @ 12:58) >  INTERPRETATION:  AP erect chest on 2023 at 12:28 PM. Patient has   sepsis.    Heart magnified by technique.    Left-sided defibrillator again noted.    Lungs remain clear.    Chest is similar to 2013.    IMPRESSION: No acute finding or change.    < end of copied text >        Consultant(s) Notes Reviewed:   YES/ No    Care Discussed with Consultants :     Plan of care was discussed with patient and /or primary care giver; all questions and concerns were addressed and care was aligned with patient's wishes.

## 2023-04-06 NOTE — CONSULT NOTE ADULT - PROBLEM SELECTOR RECOMMENDATION 2
2/2 to current acute conditions (E. coli urosepsis) and chronic conditions    Recommendations:  OOB to chair  Ambulate with a cane  Fall precautions

## 2023-04-06 NOTE — CONSULT NOTE ADULT - SUBJECTIVE AND OBJECTIVE BOX
[facility] Geriatric and Palliative Consult Service:  Dr. Jo Ann Oconnell: cell ([phone])  Dr. Celso Salcido: cell ([phone])   Tena Emmanuel DNP: cell ([phone])  Alexander Donnelly NP: cell ([phone])   Anabel Goyal NP: PATRICIA Aleman LMSW: cell ([phone])     HPI:  Patient is a 73 male, uses cane, from [facility] with PMHx of Schizophrenia, CHF, AICD, GERD, HTN, HLD, Vertigo, who was sent from assisted living to be evaluated for generalized weakness and tachycardia. He has been having generalized weakness and tachycardia for 2 days. Patient reporta having dysuria and fever for 1 month. He denies any other symptoms. He endorses chronic constipation(last BM yesterday) and chronic lower back pain. Patient denies headache, nausea, vomit, chest pain, shortness of breath, cough, lightheadedness, abdominal pain, diarrhea, dark/bloody stool, hematuria, loss of strength, or loss of sensation. (04 Apr 2023 16:44)    INTERIM HX:  Palliative Care full assessment done today. Patient more cooperative and willing to participate on the discussion. See GOC conversation.      PAST MEDICAL & SURGICAL HISTORY:  Schizophrenia  Hydrocele  CHF (congestive heart failure)  HTN  HLD (hyperlipidemia)  Vertigo  GERD (gastroesophageal reflux disease)      Hydrocele removal 2007  S/P PTCA (Percutaneous Transluminal Coronary Angioplasty)  AICD (automatic cardioverter/defibrillator) present      SOCIAL HISTORY:    Admitted from:  Assisted living. [facility]    [ none ] Substance abuse, [ none ] Tobacco hx, [ none ] Alcohol hx, [ none ] Home Opioid Hx    FAMILY HISTORY:  Father: Alzheirms  Mother: Lymphoma     unable to obtain from patient due to poor mentation, family unable to give information, see H&P for history    Baseline ADLs (prior to admission): A&O x3 and ambulatory    Allergies    No Known Allergies    Intolerances    Present Symptoms:   Pain: denies  Fatigue: denies   Nausea: denies  Lack of Appetite: denies   SOB: denies  Depression: denies  Anxiety: denies    Review of Systems: [All others negative]    MEDICATIONS  (STANDING):  ammonium lactate 12% Lotion 1 Application(s) Topical <User Schedule>  cefTRIAXone   IVPB 1000 milliGRAM(s) IV Intermittent every 24 hours  chlorproMAZINE    Tablet 50 milliGRAM(s) Oral three times a day  clopidogrel Tablet 75 milliGRAM(s) Oral daily  enoxaparin Injectable 40 milliGRAM(s) SubCutaneous every 24 hours  famotidine    Tablet 20 milliGRAM(s) Oral daily  ferrous    sulfate 325 milliGRAM(s) Oral daily  latanoprost 0.005% Ophthalmic Solution 1 Drop(s) Both EYES at bedtime  metoprolol tartrate 50 milliGRAM(s) Oral two times a day  polyethylene glycol 3350 17 Gram(s) Oral two times a day  senna 2 Tablet(s) Oral at bedtime  simvastatin 10 milliGRAM(s) Oral at bedtime    MEDICATIONS  (PRN):  acetaminophen     Tablet .. 650 milliGRAM(s) Oral every 6 hours PRN Temp greater or equal to 38C (100.4F), Mild Pain (1 - 3)  bisacodyl Suppository 10 milliGRAM(s) Rectal daily PRN Constipation  melatonin 3 milliGRAM(s) Oral at bedtime PRN Insomnia  ondansetron Injectable 4 milliGRAM(s) IV Push every 8 hours PRN Nausea and/or Vomiting      PHYSICAL EXAM:  Vital Signs Last 24 Hrs  T(C): 36.8 (07 Apr 2023 05:15), Max: 36.8 (07 Apr 2023 05:15)  T(F): 98.2 (07 Apr 2023 05:15), Max: 98.2 (07 Apr 2023 05:15)  HR: 99 (07 Apr 2023 05:15) (99 - 99)  BP: 148/93 (07 Apr 2023 05:15) (148/93 - 148/93)  BP(mean): --  RR: 18 (07 Apr 2023 05:15) (18 - 18)  SpO2: 93% (07 Apr 2023 05:15) (93% - 93%)    Parameters below as of 07 Apr 2023 05:15  Patient On (Oxygen Delivery Method): room air      General: appears chronically ill, in NAD    Palliative Performance Scale/Karnofsky Score: 40%    HEENT: conjunctivae clear, dry mouth   Lungs: unlabored breathing, comfortable on room air  CV: RRR, S1S2   GI: soft, non distended, non tender, continent. Last BM:   : continent, voiding freely  Musculoskeletal: weakness x4, no edema x4, ambulatory   Skin: warm, dry, good skin turgor   Neuro: A&O x3, verbal, no deficits  Psych: moderately suspicious/ paranoid, became a little agitated to talk about the MOLST orders  Oral intake ability: regular (DASH/TLC), full capability    LABS:    Culture - Urine (collected 04-04-23 @ 14:35)  Source: Clean Catch Clean Catch (Midstream)  Final Report (04-06-23 @ 22:42):    >100,000 CFU/ml Escherichia coli  Organism: Escherichia coli (04-06-23 @ 22:42)      RADIOLOGY & ADDITIONAL STUDIES:    < from: CT Angio Chest PE Protocol w/ IV Cont (04.06.23 @ 10:20) >    ACC: [id number] EXAM:  CT ANGIO CHEST PULM ART WAWIC   ORDERED BY: MICHELLE GUY     PROCEDURE DATE:  04/06/2023      INTERPRETATION:  INDICATION: Desaturation on room air, tachycardia, rule out pulmonary embolism    < from: CT Angio Chest PE Protocol w/ IV Cont (04.06.23 @ 10:20) >    IMPRESSION:    No pulmonary embolism.    Trace pleural effusions.    --- End ofReport ---    OZ FREIRE M.D., ATTENDING RADIOLOGIST  This document has been electronically signed. Apr 6 2023 10:30AM    < end of copied text >         [facility] Geriatric and Palliative Consult Service:  Dr. Jo Ann Oconnell: cell ([phone])  Dr. Celso Salcido: cell ([phone])   Tena Emmanuel DNP: cell ([phone])  Alexander Donnelly NP: cell ([phone])   Anabel Goyal NP: PATRICIA Aleman LMSW: cell ([phone])     HPI:  Patient is a 73 male, uses cane, from [facility] with PMHx of Schizophrenia, CHF, AICD, GERD, HTN, HLD, Vertigo, who was sent from assisted living to be evaluated for generalized weakness and tachycardia. He has been having generalized weakness and tachycardia for 2 days. Patient reporta having dysuria and fever for 1 month. He denies any other symptoms. He endorses chronic constipation(last BM yesterday) and chronic lower back pain. Patient denies headache, nausea, vomit, chest pain, shortness of breath, cough, lightheadedness, abdominal pain, diarrhea, dark/bloody stool, hematuria, loss of strength, or loss of sensation. (04 Apr 2023 16:44)    INTERIM HX:  Palliative Care full assessment done today. Patient more cooperative and willing to participate on the discussion. See GOC conversation.      PAST MEDICAL & SURGICAL HISTORY:  Schizophrenia  Hydrocele  CHF (congestive heart failure)  HTN  HLD (hyperlipidemia)  Vertigo  GERD (gastroesophageal reflux disease)      Hydrocele removal 2007  S/P PTCA (Percutaneous Transluminal Coronary Angioplasty)  AICD (automatic cardioverter/defibrillator) present      SOCIAL HISTORY:    Admitted from:  Assisted living. [facility]    [ none ] Substance abuse, [ none ] Tobacco hx, [ none ] Alcohol hx, [ none ] Home Opioid Hx    FAMILY HISTORY:  Father: Alzheirms  Mother: Lymphoma     unable to obtain from patient due to poor mentation, family unable to give information, see H&P for history    Baseline ADLs (prior to admission): A&O x3 and ambulatory    Allergies    No Known Allergies    Intolerances    Present Symptoms:   Pain: denies  Fatigue: denies   Nausea: denies  Lack of Appetite: denies   SOB: denies  Depression: denies  Anxiety: denies    Review of Systems: [All others negative]    MEDICATIONS  (STANDING):  ammonium lactate 12% Lotion 1 Application(s) Topical <User Schedule>  cefTRIAXone   IVPB 1000 milliGRAM(s) IV Intermittent every 24 hours  chlorproMAZINE    Tablet 50 milliGRAM(s) Oral three times a day  clopidogrel Tablet 75 milliGRAM(s) Oral daily  enoxaparin Injectable 40 milliGRAM(s) SubCutaneous every 24 hours  famotidine    Tablet 20 milliGRAM(s) Oral daily  ferrous    sulfate 325 milliGRAM(s) Oral daily  latanoprost 0.005% Ophthalmic Solution 1 Drop(s) Both EYES at bedtime  metoprolol tartrate 50 milliGRAM(s) Oral two times a day  polyethylene glycol 3350 17 Gram(s) Oral two times a day  senna 2 Tablet(s) Oral at bedtime  simvastatin 10 milliGRAM(s) Oral at bedtime    MEDICATIONS  (PRN):  acetaminophen     Tablet .. 650 milliGRAM(s) Oral every 6 hours PRN Temp greater or equal to 38C (100.4F), Mild Pain (1 - 3)  bisacodyl Suppository 10 milliGRAM(s) Rectal daily PRN Constipation  melatonin 3 milliGRAM(s) Oral at bedtime PRN Insomnia  ondansetron Injectable 4 milliGRAM(s) IV Push every 8 hours PRN Nausea and/or Vomiting      PHYSICAL EXAM:  Vital Signs Last 24 Hrs  T(C): 37.1 (06 Apr 2023 13:16), Max: 37.1 (06 Apr 2023 13:16)  T(F): 98.8 (06 Apr 2023 13:16), Max: 98.8 (06 Apr 2023 13:16)  HR: 111 (06 Apr 2023 13:16) (111 - 114)  BP: 154/96 (06 Apr 2023 13:16) (131/68 - 154/96)  BP(mean): --  RR: 19 (06 Apr 2023 13:16) (19 - 19)  SpO2: 91% (06 Apr 2023 13:16) (91% - 91%)    Parameters below as of 06 Apr 2023 13:16  Patient On (Oxygen Delivery Method): room air    General: appears chronically ill, in NAD    Palliative Performance Scale/Karnofsky Score: 40%    HEENT: conjunctivae clear, dry mouth   Lungs: unlabored breathing, comfortable on room air  CV: RRR, S1S2   GI: soft, non distended, non tender, continent. Last BM:   : continent, voiding freely  Musculoskeletal: weakness x4, no edema x4, ambulatory   Skin: warm, dry, good skin turgor   Neuro: A&O x3, verbal, no deficits  Psych: moderately suspicious/ paranoid, uncooperative  Oral intake ability: regular (DASH/TLC), full capability    LABS:    Culture - Urine (collected 04-04-23 @ 14:35)  Source: Clean Catch Clean Catch (Midstream)  Final Report (04-06-23 @ 22:42):    >100,000 CFU/ml Escherichia coli  Organism: Escherichia coli (04-06-23 @ 22:42)      RADIOLOGY & ADDITIONAL STUDIES:    < from: CT Angio Chest PE Protocol w/ IV Cont (04.06.23 @ 10:20) >    ACC: [id number] EXAM:  CT ANGIO CHEST PULM ART WAWIC   ORDERED BY: MICHELLE GUY     PROCEDURE DATE:  04/06/2023      INTERPRETATION:  INDICATION: Desaturation on room air, tachycardia, rule out pulmonary embolism    < from: CT Angio Chest PE Protocol w/ IV Cont (04.06.23 @ 10:20) >    IMPRESSION:    No pulmonary embolism.    Trace pleural effusions.    --- End ofReport ---    OZ FREIRE M.D., ATTENDING RADIOLOGIST  This document has been electronically signed. Apr 6 2023 10:30AM    < end of copied text >         [facility] Geriatric and Palliative Consult Service:  Dr. Jo Ann Oconnell: cell ([phone])  Dr. Celso Salcido: cell ([phone])   Tena Emmanuel DNP: cell ([phone])  Alexander Donnelly NP: cell ([phone])   Anabel Goyal NP: PATRICIA Aleman LMSW: cell ([phone])     HPI:  Patient is a 73 male, uses cane, from [facility] with PMHx of Schizophrenia, CHF, AICD, GERD, HTN, HLD, Vertigo, who was sent from assisted living to be evaluated for generalized weakness and tachycardia. He has been having generalized weakness and tachycardia for 2 days. Patient reporta having dysuria and fever for 1 month. He denies any other symptoms. He endorses chronic constipation(last BM yesterday) and chronic lower back pain. Patient denies headache, nausea, vomit, chest pain, shortness of breath, cough, lightheadedness, abdominal pain, diarrhea, dark/bloody stool, hematuria, loss of strength, or loss of sensation. (04 Apr 2023 16:44)    INTERIM HX:  Patient not very cooperative today to participate in the full Palliative Care assessment. See GOC conversation.      PAST MEDICAL & SURGICAL HISTORY:  Schizophrenia  Hydrocele  CHF (congestive heart failure)  HTN  HLD (hyperlipidemia)  Vertigo  GERD (gastroesophageal reflux disease)      Hydrocele removal 2007  S/P PTCA (Percutaneous Transluminal Coronary Angioplasty)  AICD (automatic cardioverter/defibrillator) present      SOCIAL HISTORY:    Admitted from:  [facility] living. [facility]    [ none ] Substance abuse, [ none ] Tobacco hx, [ none ] Alcohol hx, [ none ] Home Opioid Hx    FAMILY HISTORY:  Father: Alzheirms  Mother: Lymphoma     unable to obtain from patient due to poor mentation, family unable to give information, see H&P for history    Baseline ADLs (prior to admission): A&O x3 and ambulatory    Allergies    No Known Allergies    Intolerances    Present Symptoms:   Pain: denies  Fatigue: denies   Nausea: denies  Lack of Appetite: denies   SOB: denies  Depression: denies  Anxiety: denies    Review of Systems: [All others negative]    MEDICATIONS  (STANDING):  ammonium lactate 12% Lotion 1 Application(s) Topical <User Schedule>  cefTRIAXone   IVPB 1000 milliGRAM(s) IV Intermittent every 24 hours  chlorproMAZINE    Tablet 50 milliGRAM(s) Oral three times a day  clopidogrel Tablet 75 milliGRAM(s) Oral daily  enoxaparin Injectable 40 milliGRAM(s) SubCutaneous every 24 hours  famotidine    Tablet 20 milliGRAM(s) Oral daily  ferrous    sulfate 325 milliGRAM(s) Oral daily  latanoprost 0.005% Ophthalmic Solution 1 Drop(s) Both EYES at bedtime  metoprolol tartrate 50 milliGRAM(s) Oral two times a day  polyethylene glycol 3350 17 Gram(s) Oral two times a day  senna 2 Tablet(s) Oral at bedtime  simvastatin 10 milliGRAM(s) Oral at bedtime    MEDICATIONS  (PRN):  acetaminophen     Tablet .. 650 milliGRAM(s) Oral every 6 hours PRN Temp greater or equal to 38C (100.4F), Mild Pain (1 - 3)  bisacodyl Suppository 10 milliGRAM(s) Rectal daily PRN Constipation  melatonin 3 milliGRAM(s) Oral at bedtime PRN Insomnia  ondansetron Injectable 4 milliGRAM(s) IV Push every 8 hours PRN Nausea and/or Vomiting      PHYSICAL EXAM:  Vital Signs Last 24 Hrs  T(C): 37.1 (06 Apr 2023 13:16), Max: 37.1 (06 Apr 2023 13:16)  T(F): 98.8 (06 Apr 2023 13:16), Max: 98.8 (06 Apr 2023 13:16)  HR: 111 (06 Apr 2023 13:16) (111 - 114)  BP: 154/96 (06 Apr 2023 13:16) (131/68 - 154/96)  BP(mean): --  RR: 19 (06 Apr 2023 13:16) (19 - 19)  SpO2: 91% (06 Apr 2023 13:16) (91% - 91%)    Parameters below as of 06 Apr 2023 13:16  Patient On (Oxygen Delivery Method): room air    General: appears chronically ill, in NAD    Palliative Performance Scale/Karnofsky Score: 40%    HEENT: conjunctivae clear, dry mouth   Lungs: unlabored breathing, comfortable on room air  CV: RRR, S1S2   GI: soft, non distended, non tender, continent. Last BM:   : continent, voiding freely  Musculoskeletal: weakness x4, no edema x4, ambulatory   Skin: warm, dry, good skin turgor   Neuro: A&O x3, verbal, no deficits  Psych: moderately suspicious/ paranoid, uncooperative  Oral intake ability: regular (DASH/TLC), full capability    LABS:    Culture - Urine (collected 04-04-23 @ 14:35)  Source: Clean Catch Clean Catch (Midstream)  Final Report (04-06-23 @ 22:42):    >100,000 CFU/ml Escherichia coli  Organism: Escherichia coli (04-06-23 @ 22:42)      RADIOLOGY & ADDITIONAL STUDIES:    < from: CT Angio Chest PE Protocol w/ IV Cont (04.06.23 @ 10:20) >    ACC: [id number] EXAM:  CT ANGIO CHEST PULM ART WAWI   ORDERED BY: MICHELLE GUY     PROCEDURE DATE:  04/06/2023      INTERPRETATION:  INDICATION: Desaturation on room air, tachycardia, rule out pulmonary embolism    < from: CT Angio Chest PE Protocol w/ IV Cont (04.06.23 @ 10:20) >    IMPRESSION:    No pulmonary embolism.    Trace pleural effusions.    --- End ofReport ---    OZ FREIRE M.D., ATTENDING RADIOLOGIST  This document has been electronically signed. Apr 6 2023 10:30AM    < end of copied text >

## 2023-04-06 NOTE — CONSULT NOTE ADULT - SUBJECTIVE AND OBJECTIVE BOX
PULMONARY CONSULT NOTE      CARTER MOHAN  MRN-[id number]    Patient is a 73y old  Male who presents with a chief complaint of Severe sepsis due to UTI (06 Apr 2023 13:00)  Hx chart lab and xrays reviewed  CTA reviewed  Pt examined    HISTORY OF PRESENT ILLNESS: Patient is a 73 male, uses cane, from [facility] with PMHx of Schizophrenia, CHF, AICD, GERD, HTN, HLD, Vertigo, who was sent from assisted living to be evaluated for generalized weakness and tachycardia. He has been having generalized weakness and tachycardia for 2 days. Patient reporta having dysuria and fever for 1 month. He denies any other symptoms. He endorses chronic constipation(last BM yesterday) and chronic lower back pain. Patient denies headache, nausea, vomit, chest pain, shortness of breath, cough, lightheadedness, abdominal pain, diarrhea, dark/bloody stool, hematuria, loss of strength, or loss of sensation.    Home Medications:  cholecalciferol 1250 mcg (50,000 intl units) oral capsule: 1 cap(s) orally once a week (04 Apr 2023 16:27)  ferrous sulfate 325 mg (65 mg elemental iron) oral tablet: 1 tab(s) orally once a day (04 Apr 2023 16:27)  Lac-Hydrin 12% topical lotion: Apply topically to affected area once a day on bl feet and legs (04 Apr 2023 16:27)  metoprolol tartrate 50 mg oral tablet: 1 tab(s) orally 2 times a day (04 Apr 2023 16:27)  Norvasc 10 mg oral tablet: 1 tab(s) orally once a day (04 Apr 2023 16:27)  Pepcid 20 mg oral tablet: 1 tab(s) orally once a day (04 Apr 2023 16:27)  Thorazine 50 mg oral tablet: 1 tab(s) orally 3 times a day (04 Apr 2023 16:27)  Xalatan 0.005% ophthalmic solution: 1 gram(s) in each eye once a day (at bedtime) (04 Apr 2023 16:27)      MEDICATIONS  (STANDING):  ammonium lactate 12% Lotion 1 Application(s) Topical <User Schedule>  cefTRIAXone   IVPB 1000 milliGRAM(s) IV Intermittent every 24 hours  chlorproMAZINE    Tablet 50 milliGRAM(s) Oral three times a day  clopidogrel Tablet 75 milliGRAM(s) Oral daily  enoxaparin Injectable 40 milliGRAM(s) SubCutaneous every 24 hours  famotidine    Tablet 20 milliGRAM(s) Oral daily  ferrous    sulfate 325 milliGRAM(s) Oral daily  latanoprost 0.005% Ophthalmic Solution 1 Drop(s) Both EYES at bedtime  metoprolol tartrate 25 milliGRAM(s) Oral two times a day  polyethylene glycol 3350 17 Gram(s) Oral two times a day  senna 2 Tablet(s) Oral at bedtime  simvastatin 10 milliGRAM(s) Oral at bedtime      Allergies    No Known Allergies            PAST MEDICAL & SURGICAL HISTORY:  HTN      Schizophrenia      CHF (congestive heart failure)      HLD (hyperlipidemia)      Vertigo      GERD (gastroesophageal reflux disease)      Hydrocele  Hydrocele removal 2007      S/P PTCA (Percutaneous Transluminal Coronary Angioplasty)      AICD (automatic cardioverter/defibrillator) present          FAMILY HISTORY:  No pertinent family history in first degree relatives        SOCIAL HISTORY SMOKING--    ETOH--     DRUGS--        REVIEW OF SYSTEMS:  CONSTITUTIONAL:  fever+, weight loss--, fatigue ++  EYES: No eye pain, visual disturbances, or discharge  ENT:  No difficulty hearing, tinnitus, vertigo; No sinus or throat pain  NECK: No pain or stiffness   RESPIRATORY:  cough--   wheezing--   chills--   hemoptysis--    Shortness of Breath--  CARDIOVASCULAR: No chest pain, palpitations, passing out, dizziness, or leg swelling  GASTROINTESTINAL: No abdominal or epigastric pain. No nausea, vomiting but  constipation++  NEUROLOGICAL: No headaches, memory loss, loss of strength, numbness, or tremors  SKIN: No itching, burning, rashes, or lesions   LYMPH Nodes: No enlarged glands  ENDOCRINE: No heat or cold intolerance; No hair loss  MUSCULOSKELETAL: No joint pain or swelling; No muscle, back, or extremity pain  PSYCHIATRIC: No depression, anxiety, mood swings, or difficulty sleeping  HEME/LYMPH: No easy bruising, or bleeding gums  ALLERGY AND IMMUNOLOGIC: No hives or eczema      Vital Signs Last 24 Hrs  T(C): 37.1 (06 Apr 2023 13:16), Max: 37.1 (06 Apr 2023 13:16)  T(F): 98.8 (06 Apr 2023 13:16), Max: 98.8 (06 Apr 2023 13:16)  HR: 111 (06 Apr 2023 13:16) (111 - 114)  BP: 154/96 (06 Apr 2023 13:16) (131/68 - 154/96)  BP(mean): --  RR: 19 (06 Apr 2023 13:16) (19 - 19)  SpO2: 91% (06 Apr 2023 13:16) (91% - 91%)    Parameters below as of 06 Apr 2023 13:16  Patient On (Oxygen Delivery Method): room air      I&O's Detail    05 Apr 2023 07:01  -  06 Apr 2023 07:00  --------------------------------------------------------  IN:  Total IN: 0 mL    OUT:    Voided (mL): 300 mL  Total OUT: 300 mL    Total NET: -300 mL          PHYSICAL EXAMINATION:    GENERAL: The patient is a well-developed, well-nourished in no apparent distress.   SKIN: No rashes ecchymoses or cyanosis  HEENT: Head is normocephalic and atraumatic. Extraocular muscles are intact. Mucous membranes are moist.   Neck supple LN not felt, JVP not increased  Thyroid not enlarged  Lymphatic: No lymphadenopathy  Cardiovascular:  S1 S2  heard ,RSR , JVP not increased , systolic  murmur at apex, No  gallop or rub, AICD lt side  Respiratory:  Symmetrical chest wall movements Breathing vesicular , Percussion note normal no dulness   with  no  rales or  wheeze  ABDOMEN:  Soft, Non-tender, No  hepatosplenomegaly ,BS positive		  Extremities: Normal range of motion, No clubbing, cyanosis or edema , No calf tenderness  Vascular: Peripheral pulses palpable 2+ bilaterally  CNS: Alert and responsive  Mood and affect appropriate  Cranial nerves intact  sensory intact  motor Power5/5, DTR 2+  Babinski neg        LABS:                        10.5   18.75 )-----------( 284      ( 05 Apr 2023 05:48 )             32.7     04-05    132<L>  |  104  |  17  ----------------------------<  144<H>  3.8   |  23  |  1.20    Ca    8.5      05 Apr 2023 05:48  Phos  1.8     04-05  Mg     1.9     04-05    TPro  6.2  /  Alb  2.7<L>  /  TBili  0.8  /  DBili  x   /  AST  19  /  ALT  16  /  AlkPhos  62  04      MICROBIOLOGY:    Culture - Urine (collected 04-04-23 @ 14:35)  Source: Clean Catch Clean Catch (Midstream)  Preliminary Report (04-06-23 @ 02:10):    >100,000 CFU/ml Escherichia coli    Culture - Blood (collected 04-04-23 @ 13:10)  Source: .Blood Blood-Peripheral  Preliminary Report (04-05-23 @ 18:02):    No growth to date.    Culture - Blood (collected 04-04-23 @ 13:00)  Source: .Blood Blood-Peripheral  Preliminary Report (04-05-23 @ 18:02):    No growth to date.        RADIOLOGY & ADDITIONAL STUDIES:    CXR:  Heart magnified by technique.    Left-sided defibrillator again noted.    Lungs remain clear.      CTA Chest;    < from: CT Angio Chest PE Protocol w/ IV Cont (04.06.23 @ 10:20) >  PULMONARY ANGIOGRAM:  No pulmonary embolism.    LUNGS/AIRWAYS/PLEURA: Patent trachea and bronchi. Right upper lobe   calcified granuloma. Passive and dependent atelectasis in the lower lobes   and lingula. Trace pleural effusions.    LYMPH NODES/MEDIASTINUM: Unremarkable.    HEART/VASCULATURE: Cardiomegaly. No pericardial effusion. Coronary artery   calcifications. 4 cm mid ascending aorta. Biventricular ICD.    UPPER ABDOMEN: Unremarkable.    BONES/SOFT TISSUES: Unremarkable.      g   EKG;  < from: 12 Lead ECG (04.04.23 @ 13:14) >  Normal sinus rhythm        echo: Pending

## 2023-04-06 NOTE — CHART NOTE - NSCHARTNOTEFT_GEN_A_CORE
Attempted to contact pt's emergency contact/  Bernabe Kimble at [phone] to obtain information about AICD per pt's request.     Left msg to return call.       Blanca Quinn NP Medicine  [phone]

## 2023-04-06 NOTE — CONSULT NOTE ADULT - CONSULT REASON
Consult to: Discuss complex medical decision making related to Advanced Directives/ goals of care Consult to: Discuss complex medical decision making related to Advanced Directives/ goals of care.

## 2023-04-06 NOTE — CONSULT NOTE ADULT - CONVERSATION DETAILS
Initial Palliative Care [place] [facility] conversation with patient. Ask about his baseline status at [facility] and if he understands his current acute and chronic conditions. Patient stated that he has an urinary infection and the "doctor" stated that his chest imaging results showed no significant problems and he is fine. Patient did not mention anything about his Schizophrenia, CV (HTN, CHF, s/p AICD), and renal chronic conditions, therefore, patient seems to lack insight about his full medical conditions.    Asked patient if he would like to name someone who he trusts as his Health Care Proxy. Patient asked why would he need to do that? I explained that it would be in his best interest having someone who he can count on when he is unable to make decisions for himself. Patient stated that he does not trust his family members. I asked him if he trusts anyone, another relative or a friend. He said "no". I only trust [facility] Administrators, Karlos Whitney and Horacio Jorge. I explained to him that unlikely they would accept that responsibility. He asked me to try and I called Mr. Patel and he stated he could not become patient's HCP. Advised me to call his brothers and sisters. He gave me his sister information, Sushila, at [phone]. When I asked patient if I could contact her, stated "no".     Then I asked questions about the MOLST orders. "I DO NOT WANT TO DO THIS NOW!" I am not going for surgery and I am not dying. He refused to engage in the conversation. I provided emotional support to patient.    > At this time, it is hard to determine if patient has full capacity to make informed medical decisions and who would be an appropriate Surrogate for the patient.      Case discussed with Primary Care Attending, Zane Mistry MD.

## 2023-04-06 NOTE — GOALS OF CARE CONVERSATION - ADVANCED CARE PLANNING - CONVERSATION DETAILS
Patient is a 73 male, uses cane, from [facility] with PMHx of Schizophrenia, CHF, AICD, GERD, HTN, HLD, Vertigo, who was sent from assisted living to be evaluated for generalized weakness and tachycardia. Admitted for severe sepsis due to UTI. On ceftriaxone . BC NGTD . UC growing Ecoli. Awaiting sensitivities. ID consulted.     Hospital course complicated by acute hypoxic respiratory failure, desaturated to 88% on RA. Pt initially refused CTA chest , then agreed . CTA chest neg for PE.       Goals of care discussion held with pt. Pt endorses he has brothers and sister, all of whom he does not want to "bother" and don't want as HCP as pt does not agree with their beliefs. Pt endorses he does not have close friends to assign  as HCP. Pt endorses "my doctors can whatever do want with me. " I m not ready to die.     Noted pt refusing meds, labs, and oxygen. Discussed risk/benefit of compliance with medical management. Pt easily agitated, stating " the CT scan said Im okay.  I don't need plavix, I don't need Oxygen. I can get it by drinking water because it is H2O..."     Pt requests to contact his brother Bernabe Kimble to obtain information regarding his ICD.

## 2023-04-06 NOTE — CONSULT NOTE ADULT - PROBLEM SELECTOR RECOMMENDATION 3
74 y/o male with multiple comorbidities and deteriorating conditions. Patient is at moderate-high risk for mortality, morbidity, infections, and re-hospitalizations.    Code Status: FULL Code    Ximena: 40%    Palliative Care will follow

## 2023-04-06 NOTE — CONSULT NOTE ADULT - PROBLEM SELECTOR RECOMMENDATION 9
Uncooperative and suspicious today. Did not want to participate on GOC conversation regarding MOLST orders and did not want to name a HCP.    Palliative Care will follow tomorrow

## 2023-04-06 NOTE — PROGRESS NOTE ADULT - SUBJECTIVE AND OBJECTIVE BOX
PATIENT SEEN AND EXAMINED ON :- 4/6/23  DATE OF SERVICE:   4/6/23          Interim events noted,Labs ,Radiological studies and Cardiology tests reviewed .    MR#[id number]  PATIENT NAME:[facility] COURSE: HPI:  Patient is a 73 male, uses cane, from [facility] with PMHx of Schizophrenia, CHF, AICD, GERD, HTN, HLD, Vertigo, who was sent from assisted living to be evaluated for generalized weakness and tachycardia. He has been having generalized weakness and tachycardia for 2 days. Patient reporta having dysuria and fever for 1 month. He denies any other symptoms. He endorses chronic constipation(last BM yesterday) and chronic lower back pain. Patient denies headache, nausea, vomit, chest pain, shortness of breath, cough, lightheadedness, abdominal pain, diarrhea, dark/bloody stool, hematuria, loss of strength, or loss of sensation. (04 Apr 2023 16:44)      INTERIM EVENTS:Patient seen at bedside ,interim events noted.      PMH -reviewed admission note, no change since admission  HEART FAILURE: Acute[ ]Chronic[ ] Systolic[ ] Diastolic[ ] Combined Systolic and Diastolic[ ]  CAD[ ] CABG[ ] PCI[ ]  DEVICES[ ] PPM[ ] ICD[ ] ILR[ ]  ATRIAL FIBRILLATION[ ] Paroxysmal[ ] Permanent[ ] CHADS2-[  ]  IRMA[ ] CKD1[ ] CKD2[ ] CKD3[ ] CKD4[ ] ESRD[ ]  COPD[ ] HTN[ ]   DM[ ] Type1[ ] Type 2[ ]   CVA[ ] Paresis[ ]    AMBULATION: Assisted[ ] Cane/walker[ ] Independent[ ]    MEDICATIONS  (STANDING):  ammonium lactate 12% Lotion 1 Application(s) Topical <User Schedule>  cefTRIAXone   IVPB 1000 milliGRAM(s) IV Intermittent every 24 hours  chlorproMAZINE    Tablet 50 milliGRAM(s) Oral three times a day  clopidogrel Tablet 75 milliGRAM(s) Oral daily  enoxaparin Injectable 40 milliGRAM(s) SubCutaneous every 24 hours  famotidine    Tablet 20 milliGRAM(s) Oral daily  ferrous    sulfate 325 milliGRAM(s) Oral daily  latanoprost 0.005% Ophthalmic Solution 1 Drop(s) Both EYES at bedtime  metoprolol tartrate 25 milliGRAM(s) Oral two times a day  polyethylene glycol 3350 17 Gram(s) Oral two times a day  senna 2 Tablet(s) Oral at bedtime  simvastatin 10 milliGRAM(s) Oral at bedtime    MEDICATIONS  (PRN):  acetaminophen     Tablet .. 650 milliGRAM(s) Oral every 6 hours PRN Temp greater or equal to 38C (100.4F), Mild Pain (1 - 3)  bisacodyl Suppository 10 milliGRAM(s) Rectal daily PRN Constipation  melatonin 3 milliGRAM(s) Oral at bedtime PRN Insomnia  ondansetron Injectable 4 milliGRAM(s) IV Push every 8 hours PRN Nausea and/or Vomiting            REVIEW OF SYSTEMS:  Constitutional: [ ] fever, [ ]weight loss,  [ ]fatigue [ ]weight gain  Eyes: [ ] visual changes  Respiratory: [ ]shortness of breath;  [ ] cough, [ ]wheezing, [ ]chills, [ ]hemoptysis  Cardiovascular: [ ] chest pain, [ ]palpitations, [ ]dizziness,  [ ]leg swelling[ ]orthopnea[ ]PND  Gastrointestinal: [ ] abdominal pain, [ ]nausea, [ ]vomiting,  [ ]diarrhea [ ]Constipation [ ]Melena  Genitourinary: [ ] dysuria, [ ] hematuria [ ]Mcnamara  Neurologic: [ ] headaches [ ] tremors[ ]weakness [ ]Paralysis Right[ ] Left[ ]  Skin: [ ] itching, [ ]burning, [ ] rashes  Endocrine: [ ] heat or cold intolerance  Musculoskeletal: [ ] joint pain or swelling; [ ] muscle, back, or extremity pain  Psychiatric: [ ] depression, [ ]anxiety, [ ]mood swings, or [ ]difficulty sleeping  Hematologic: [ ] easy bruising, [ ] bleeding gums    [ ] All remaining systems negative except as per above.   [ ]Unable to obtain.  [x] No change in ROS since admission      Vital Signs Last 24 Hrs  T(C): 37.1 (06 Apr 2023 13:16), Max: 37.1 (06 Apr 2023 13:16)  T(F): 98.8 (06 Apr 2023 13:16), Max: 98.8 (06 Apr 2023 13:16)  HR: 111 (06 Apr 2023 13:16) (111 - 111)  BP: 154/96 (06 Apr 2023 13:16) (154/96 - 154/96)  BP(mean): --  RR: 19 (06 Apr 2023 13:16) (19 - 19)  SpO2: 91% (06 Apr 2023 13:16) (91% - 91%)    Parameters below as of 06 Apr 2023 13:16  Patient On (Oxygen Delivery Method): room air      I&O's Summary    05 Apr 2023 07:01  -  06 Apr 2023 07:00  --------------------------------------------------------  IN: 0 mL / OUT: 300 mL / NET: -300 mL        PHYSICAL EXAM:  General: No acute distress BMI-  HEENT: EOMI, PERRL  Neck: Supple, [ ] JVD  Lungs: Equal air entry bilaterally; [ ] rales [ ] wheezing [ ] rhonchi  Heart: Regular rate and rhythm; [x ] murmur   2/6 [ x] systolic [ ] diastolic [ ] radiation[ ] rubs [ ]  gallops  Abdomen: Nontender, bowel sounds present  Extremities: No clubbing, cyanosis, [ ] edema [ ]Pulses  equal and intact  Nervous system:  Alert & Oriented X3, no focal deficits  Psychiatric: Normal affect  Skin: No rashes or lesions    LABS:  04-05    132<L>  |  104  |  17  ----------------------------<  144<H>  3.8   |  23  |  1.20    Ca    8.5      05 Apr 2023 05:48  Phos  1.8     04-05  Mg     1.9     04-05    TPro  6.2  /  Alb  2.7<L>  /  TBili  0.8  /  DBili  x   /  AST  19  /  ALT  16  /  AlkPhos  62  04-05    Creatinine Trend: 1.20<--, 1.27<--, 1.53<--                        10.5   18.75 )-----------( 284      ( 05 Apr 2023 05:48 )             32.7

## 2023-04-07 DIAGNOSIS — Z02.9 ENCOUNTER FOR ADMINISTRATIVE EXAMINATIONS, UNSPECIFIED: ICD-10-CM

## 2023-04-07 PROCEDURE — 99497 ADVNCD CARE PLAN 30 MIN: CPT

## 2023-04-07 PROCEDURE — 90792 PSYCH DIAG EVAL W/MED SRVCS: CPT

## 2023-04-07 PROCEDURE — 99233 SBSQ HOSP IP/OBS HIGH 50: CPT

## 2023-04-07 RX ORDER — METOPROLOL TARTRATE 50 MG
50 TABLET ORAL
Refills: 0 | Status: DISCONTINUED | OUTPATIENT
Start: 2023-04-07 | End: 2023-04-10

## 2023-04-07 RX ORDER — METOPROLOL TARTRATE 50 MG
25 TABLET ORAL ONCE
Refills: 0 | Status: COMPLETED | OUTPATIENT
Start: 2023-04-07 | End: 2023-04-07

## 2023-04-07 RX ADMIN — Medication 50 MILLIGRAM(S): at 18:16

## 2023-04-07 RX ADMIN — Medication 25 MILLIGRAM(S): at 05:37

## 2023-04-07 RX ADMIN — Medication 50 MILLIGRAM(S): at 05:37

## 2023-04-07 RX ADMIN — Medication 50 MILLIGRAM(S): at 21:55

## 2023-04-07 RX ADMIN — Medication 50 MILLIGRAM(S): at 16:18

## 2023-04-07 RX ADMIN — POLYETHYLENE GLYCOL 3350 17 GRAM(S): 17 POWDER, FOR SOLUTION ORAL at 05:37

## 2023-04-07 RX ADMIN — Medication 25 MILLIGRAM(S): at 09:38

## 2023-04-07 NOTE — PROGRESS NOTE ADULT - SUBJECTIVE AND OBJECTIVE BOX
PATIENT SEEN AND EXAMINED ON :- 4/7/23  DATE OF SERVICE:  4/7/23           Interim events noted,Labs ,Radiological studies and Cardiology tests reviewed .    MR#[id number]  PATIENT NAME:[facility] COURSE: HPI:  Patient is a 73 male, uses cane, from [facility] with PMHx of Schizophrenia, CHF, AICD, GERD, HTN, HLD, Vertigo, who was sent from assisted living to be evaluated for generalized weakness and tachycardia. He has been having generalized weakness and tachycardia for 2 days. Patient reporta having dysuria and fever for 1 month. He denies any other symptoms. He endorses chronic constipation(last BM yesterday) and chronic lower back pain. Patient denies headache, nausea, vomit, chest pain, shortness of breath, cough, lightheadedness, abdominal pain, diarrhea, dark/bloody stool, hematuria, loss of strength, or loss of sensation. (04 Apr 2023 16:44)      INTERIM EVENTS:Patient seen at bedside ,interim events noted.      PMH -reviewed admission note, no change since admission  HEART FAILURE: Acute[ ]Chronic[ ] Systolic[ ] Diastolic[ ] Combined Systolic and Diastolic[ ]  CAD[ ] CABG[ ] PCI[ ]  DEVICES[ ] PPM[ ] ICD[ ] ILR[ ]  ATRIAL FIBRILLATION[ ] Paroxysmal[ ] Permanent[ ] CHADS2-[  ]  IRMA[ ] CKD1[ ] CKD2[ ] CKD3[ ] CKD4[ ] ESRD[ ]  COPD[ ] HTN[ ]   DM[ ] Type1[ ] Type 2[ ]   CVA[ ] Paresis[ ]    AMBULATION: Assisted[ ] Cane/walker[ ] Independent[ ]    MEDICATIONS  (STANDING):  ammonium lactate 12% Lotion 1 Application(s) Topical <User Schedule>  cefTRIAXone   IVPB 1000 milliGRAM(s) IV Intermittent every 24 hours  chlorproMAZINE    Tablet 50 milliGRAM(s) Oral three times a day  clopidogrel Tablet 75 milliGRAM(s) Oral daily  enoxaparin Injectable 40 milliGRAM(s) SubCutaneous every 24 hours  famotidine    Tablet 20 milliGRAM(s) Oral daily  ferrous    sulfate 325 milliGRAM(s) Oral daily  latanoprost 0.005% Ophthalmic Solution 1 Drop(s) Both EYES at bedtime  metoprolol tartrate 50 milliGRAM(s) Oral two times a day  polyethylene glycol 3350 17 Gram(s) Oral two times a day  senna 2 Tablet(s) Oral at bedtime  simvastatin 10 milliGRAM(s) Oral at bedtime    MEDICATIONS  (PRN):  acetaminophen     Tablet .. 650 milliGRAM(s) Oral every 6 hours PRN Temp greater or equal to 38C (100.4F), Mild Pain (1 - 3)  bisacodyl Suppository 10 milliGRAM(s) Rectal daily PRN Constipation  melatonin 3 milliGRAM(s) Oral at bedtime PRN Insomnia  ondansetron Injectable 4 milliGRAM(s) IV Push every 8 hours PRN Nausea and/or Vomiting            REVIEW OF SYSTEMS:  Constitutional: [ ] fever, [ ]weight loss,  [ ]fatigue [ ]weight gain  Eyes: [ ] visual changes  Respiratory: [ ]shortness of breath;  [ ] cough, [ ]wheezing, [ ]chills, [ ]hemoptysis  Cardiovascular: [ ] chest pain, [ ]palpitations, [ ]dizziness,  [ ]leg swelling[ ]orthopnea[ ]PND  Gastrointestinal: [ ] abdominal pain, [ ]nausea, [ ]vomiting,  [ ]diarrhea [ ]Constipation [ ]Melena  Genitourinary: [ ] dysuria, [ ] hematuria [ ]Mcnamara  Neurologic: [ ] headaches [ ] tremors[ ]weakness [ ]Paralysis Right[ ] Left[ ]  Skin: [ ] itching, [ ]burning, [ ] rashes  Endocrine: [ ] heat or cold intolerance  Musculoskeletal: [ ] joint pain or swelling; [ ] muscle, back, or extremity pain  Psychiatric: [ ] depression, [ ]anxiety, [ ]mood swings, or [ ]difficulty sleeping  Hematologic: [ ] easy bruising, [ ] bleeding gums    [ ] All remaining systems negative except as per above.   [ ]Unable to obtain.  [x] No change in ROS since admission      Vital Signs Last 24 Hrs  T(C): 36.4 (07 Apr 2023 14:07), Max: 36.8 (07 Apr 2023 05:15)  T(F): 97.5 (07 Apr 2023 14:07), Max: 98.2 (07 Apr 2023 05:15)  HR: 99 (07 Apr 2023 18:17) (83 - 99)  BP: 130/89 (07 Apr 2023 18:17) (125/78 - 148/93)  BP(mean): --  RR: 16 (07 Apr 2023 18:17) (16 - 18)  SpO2: 98% (07 Apr 2023 18:17) (93% - 98%)    Parameters below as of 07 Apr 2023 18:17  Patient On (Oxygen Delivery Method): room air      I&O's Summary      PHYSICAL EXAM:  General: No acute distress BMI-  HEENT: EOMI, PERRL  Neck: Supple, [ ] JVD  Lungs: Equal air entry bilaterally; [ ] rales [ ] wheezing [ ] rhonchi  Heart: Regular rate and rhythm; [x ] murmur   2/6 [ x] systolic [ ] diastolic [ ] radiation[ ] rubs [ ]  gallops  Abdomen: Nontender, bowel sounds present  Extremities: No clubbing, cyanosis, [ ] edema [ ]Pulses  equal and intact  Nervous system:  Alert & Oriented X3, no focal deficits  Psychiatric: Normal affect  Skin: No rashes or lesions    LABS:        Creatinine Trend: 1.20<--, 1.27<--, 1.53<--

## 2023-04-07 NOTE — PROGRESS NOTE ADULT - SUBJECTIVE AND OBJECTIVE BOX
NP Note discussed with  Primary Attending    Patient is a 73y old  Male who presents with a chief complaint of Severe sepsis due to UTI (07 Apr 2023 10:24)      INTERVAL HPI/OVERNIGHT EVENTS: no new complaints    MEDICATIONS  (STANDING):  ammonium lactate 12% Lotion 1 Application(s) Topical <User Schedule>  cefTRIAXone   IVPB 1000 milliGRAM(s) IV Intermittent every 24 hours  chlorproMAZINE    Tablet 50 milliGRAM(s) Oral three times a day  clopidogrel Tablet 75 milliGRAM(s) Oral daily  enoxaparin Injectable 40 milliGRAM(s) SubCutaneous every 24 hours  famotidine    Tablet 20 milliGRAM(s) Oral daily  ferrous    sulfate 325 milliGRAM(s) Oral daily  latanoprost 0.005% Ophthalmic Solution 1 Drop(s) Both EYES at bedtime  metoprolol tartrate 50 milliGRAM(s) Oral two times a day  polyethylene glycol 3350 17 Gram(s) Oral two times a day  senna 2 Tablet(s) Oral at bedtime  simvastatin 10 milliGRAM(s) Oral at bedtime    MEDICATIONS  (PRN):  acetaminophen     Tablet .. 650 milliGRAM(s) Oral every 6 hours PRN Temp greater or equal to 38C (100.4F), Mild Pain (1 - 3)  bisacodyl Suppository 10 milliGRAM(s) Rectal daily PRN Constipation  melatonin 3 milliGRAM(s) Oral at bedtime PRN Insomnia  ondansetron Injectable 4 milliGRAM(s) IV Push every 8 hours PRN Nausea and/or Vomiting      __________________________________________________  REVIEW OF SYSTEMS:    CONSTITUTIONAL: No fever,   EYES: no acute visual disturbances  NECK: No pain or stiffness  RESPIRATORY: No cough; No shortness of breath  CARDIOVASCULAR: No chest pain, no palpitations  GASTROINTESTINAL: No pain. No nausea or vomiting; No diarrhea   NEUROLOGICAL: No headache or numbness, no tremors  MUSCULOSKELETAL: No joint pain, no muscle pain  GENITOURINARY: no dysuria, no frequency, no hesitancy  PSYCHIATRY: no depression , no anxiety  ALL OTHER  ROS negative        Vital Signs Last 24 Hrs  T(C): 36.8 (07 Apr 2023 05:15), Max: 36.8 (07 Apr 2023 05:15)  T(F): 98.2 (07 Apr 2023 05:15), Max: 98.2 (07 Apr 2023 05:15)  HR: 99 (07 Apr 2023 05:15) (99 - 99)  BP: 148/93 (07 Apr 2023 05:15) (148/93 - 148/93)  BP(mean): --  RR: 18 (07 Apr 2023 05:15) (18 - 18)  SpO2: 93% (07 Apr 2023 05:15) (93% - 93%)    Parameters below as of 07 Apr 2023 05:15  Patient On (Oxygen Delivery Method): room air        ________________________________________________  PHYSICAL EXAM:  GENERAL: NAD  HEENT: Normocephalic;  conjunctivae and sclerae clear; moist mucous membranes;   NECK : supple  CHEST/LUNG: Clear to auscultation bilaterally with good air entry   HEART: S1 S2  regular; no murmurs, gallops or rubs  ABDOMEN: Soft, Nontender, Nondistended; Bowel sounds present  EXTREMITIES: no cyanosis; no edema; no calf tenderness  SKIN: warm and dry; no rash  NERVOUS SYSTEM:  Awake and alert; Oriented  to place, person and time ; no new deficits    _________________________________________________  LABS:              CAPILLARY BLOOD GLUCOSE            RADIOLOGY & ADDITIONAL TESTS:  < from: CT Angio Chest PE Protocol w/ IV Cont (04.06.23 @ 10:20) >    ACC: [id number] EXAM:  CT ANGIO CHEST PULM [facility]   ORDERED BY: MICHELLE GUY     PROCEDURE DATE:  04/06/2023          INTERPRETATION:  INDICATION: Desaturation on room air, tachycardia, rule   out pulmonary embolism    TECHNIQUE: Volumetric images of the chest were obtained after the   administration of 70 mL of Omnipaque 350. Maximum intensity projection   images were generated.    COMPARISON: CT chest 9/9/2013.    FINDINGS:    PULMONARY ANGIOGRAM:  No pulmonary embolism.    LUNGS/AIRWAYS/PLEURA: Patent trachea and bronchi. Right upper lobe   calcified granuloma. Passive and dependent atelectasis in the lower lobes   and lingula. Trace pleural effusions.    LYMPH NODES/MEDIASTINUM: Unremarkable.    HEART/VASCULATURE: Cardiomegaly. No pericardial effusion. Coronary artery   calcifications. 4 cm midascending aorta. Biventricular ICD.    UPPER ABDOMEN: Unremarkable.    BONES/SOFT TISSUES: Unremarkable.      IMPRESSION:    No pulmonary embolism.    Trace pleural effusions.    --- End ofReport ---            OZ FREIRE M.D., ATTENDING RADIOLOGIST  This document has been electronically signed. Apr 6 2023 10:30AM    < end of copied text >  < from: Xray Chest 1 View- PORTABLE-Urgent (04.04.23 @ 12:58) >    ACC: [id number] EXAM:  XR CHEST PORTABLE URGENT 1V   ORDERED BY: CHRIS MADSEN     PROCEDURE DATE:  04/04/2023          INTERPRETATION:  AP erect chest on April 4, 2023 at 12:28 PM. Patient has   sepsis.    Heart magnified by technique.    Left-sided defibrillator again noted.    Lungs remain clear.    Chest is similar to September 7, 2013.    IMPRESSION: No acute finding or change.    --- End of Report ---            TAMIKO SAAB MD; Attending Radiologist  This document has been electronically signed. Apr 4 2023  1:08PM    < end of copied text >    Imaging Personally Reviewed:  YES    Consultant(s) Notes Reviewed:   YES    Care Discussed with Consultants : ID     Plan of care was discussed with patient and /or primary care giver; all questions and concerns were addressed and care was aligned with patient's wishes.

## 2023-04-07 NOTE — PROGRESS NOTE ADULT - SUBJECTIVE AND OBJECTIVE BOX
follow up on:  complex medical decision making related to goals of care    [facility]iatric and Palliative Consult Service:  Dr. Jo Ann Oconnell: cell ([phone])  Dr. Celso Salcido: cell ([phone])  Tena Emmanuel DNP: cell ([phone])  Alexander Donnelly NP: cell ([phone])   Anabel Goyal NP: PATRICIA Aleman LMSW: cell ([phone])     OVERNIGHT EVENTS:    Present Symptoms:   Pain:   Fatigue:  Nausea:  Lack of Appetite:   SOB:  Depression:  Anxiety:  Review of Systems: [All others negative or Unable to obtain due to poor mentation]    MEDICATIONS  (STANDING):  ammonium lactate 12% Lotion 1 Application(s) Topical <User Schedule>  cefTRIAXone   IVPB 1000 milliGRAM(s) IV Intermittent every 24 hours  chlorproMAZINE    Tablet 50 milliGRAM(s) Oral three times a day  clopidogrel Tablet 75 milliGRAM(s) Oral daily  enoxaparin Injectable 40 milliGRAM(s) SubCutaneous every 24 hours  famotidine    Tablet 20 milliGRAM(s) Oral daily  ferrous    sulfate 325 milliGRAM(s) Oral daily  latanoprost 0.005% Ophthalmic Solution 1 Drop(s) Both EYES at bedtime  metoprolol tartrate 50 milliGRAM(s) Oral two times a day  polyethylene glycol 3350 17 Gram(s) Oral two times a day  senna 2 Tablet(s) Oral at bedtime  simvastatin 10 milliGRAM(s) Oral at bedtime    MEDICATIONS  (PRN):  acetaminophen     Tablet .. 650 milliGRAM(s) Oral every 6 hours PRN Temp greater or equal to 38C (100.4F), Mild Pain (1 - 3)  bisacodyl Suppository 10 milliGRAM(s) Rectal daily PRN Constipation  melatonin 3 milliGRAM(s) Oral at bedtime PRN Insomnia  ondansetron Injectable 4 milliGRAM(s) IV Push every 8 hours PRN Nausea and/or Vomiting      PHYSICAL EXAM:  Vital Signs Last 24 Hrs  T(C): 36.4 (07 Apr 2023 14:07), Max: 36.8 (07 Apr 2023 05:15)  T(F): 97.5 (07 Apr 2023 14:07), Max: 98.2 (07 Apr 2023 05:15)  HR: 83 (07 Apr 2023 14:07) (83 - 99)  BP: 125/78 (07 Apr 2023 14:07) (125/78 - 148/93)  BP(mean): --  RR: 18 (07 Apr 2023 14:07) (18 - 18)  SpO2: 94% (07 Apr 2023 14:07) (93% - 94%)    Parameters below as of 07 Apr 2023 14:07  Patient On (Oxygen Delivery Method): room air        General: alert  oriented x ____    lethargic distressed cachexia  verbal nonverbal  unarousable     Palliative Performance Scale/Karnofsky Score:  ECOG Performance:    HEENT: no abnormal lesion, dry mouth  ET tube/trach oral lesions:  Lungs: tachypnea/labored breathing, audible excessive secretions  CV: RRR, S1S2, tachycardia  GI: soft non distended non tender  incontinent               PEG/NG/OG tube  constipation  last BM:   : incontinent  oliguria/anuria  escalante  Musculoskeletal: weakness x4 edema x4    ambulatory with assistance   mostly/fully bedbound/wheelchair bound  Skin: no abnormal skin lesions, poor skin turgor, pressure ulcers stage:   Neuro: no deficits, mild cognitive impairment dsyphagia/dysarthria paresis  Oral intake ability: unable/only mouth care, minimal moderate full capability    LABS:                RADIOLOGY & ADDITIONAL STUDIES:

## 2023-04-07 NOTE — BH CONSULTATION LIAISON ASSESSMENT NOTE - HPI (INCLUDE ILLNESS QUALITY, SEVERITY, DURATION, TIMING, CONTEXT, MODIFYING FACTORS, ASSOCIATED SIGNS AND SYMPTOMS)
72 y/o M with a hx of schizophrenia, HTN, HLD, CHF, AICD, GERD, and vertigo, who is admitted due to severe sepsis secondary to a UTI. He is consulted for concerns regarding his underlying psychotic disorder. Patient found awake, alert, and oriented to person, place and partially time. He reports that he was admitted due to a UTI, but then proceeded to mention an array of other ailments he suffers from. He also explained that he has suffered from schizophrenia for 53 years, but that with Thorazine he has been mostly stable. Said that he is usually paranoid, but has been more paranoid for the past few weeks; denied feeling paranoid about hospital employees. Had episodes of thought and speech disorganization at times, but this is probably close to this baseline. He expressed concerns about ending up with a colostomy, "because of the invagination in my rectum". Denied any death wishes, SI, HI or VH. Said that, "I'm an  and a , so sometimes talk to myself". Reports eating and sleeping well. Denied feelings of hopelessness, worthlessness or guilt. Says that his Thorazine was lowered from 200 mg to 150 mg several months ago, and is agreeable to an adjustment.

## 2023-04-07 NOTE — BH CONSULTATION LIAISON ASSESSMENT NOTE - CURRENT MEDICATION
MEDICATIONS  (STANDING):  ammonium lactate 12% Lotion 1 Application(s) Topical <User Schedule>  cefTRIAXone   IVPB 1000 milliGRAM(s) IV Intermittent every 24 hours  chlorproMAZINE    Tablet 50 milliGRAM(s) Oral three times a day  clopidogrel Tablet 75 milliGRAM(s) Oral daily  enoxaparin Injectable 40 milliGRAM(s) SubCutaneous every 24 hours  famotidine    Tablet 20 milliGRAM(s) Oral daily  ferrous    sulfate 325 milliGRAM(s) Oral daily  latanoprost 0.005% Ophthalmic Solution 1 Drop(s) Both EYES at bedtime  metoprolol tartrate 50 milliGRAM(s) Oral two times a day  polyethylene glycol 3350 17 Gram(s) Oral two times a day  senna 2 Tablet(s) Oral at bedtime  simvastatin 10 milliGRAM(s) Oral at bedtime    MEDICATIONS  (PRN):  acetaminophen     Tablet .. 650 milliGRAM(s) Oral every 6 hours PRN Temp greater or equal to 38C (100.4F), Mild Pain (1 - 3)  bisacodyl Suppository 10 milliGRAM(s) Rectal daily PRN Constipation  melatonin 3 milliGRAM(s) Oral at bedtime PRN Insomnia  ondansetron Injectable 4 milliGRAM(s) IV Push every 8 hours PRN Nausea and/or Vomiting

## 2023-04-07 NOTE — BH CONSULTATION LIAISON ASSESSMENT NOTE - RISK ASSESSMENT
Suffers from a chronic psychiatric illness, but has no known SA's. At an increased, but still low risk

## 2023-04-07 NOTE — PROGRESS NOTE ADULT - SUBJECTIVE AND OBJECTIVE BOX
follow up on:  complex medical decision making related to goals of care    [facility] Geriatric and Palliative Consult Service:  Dr. Jo Ann Oconnell: cell ([phone])  Dr. Celso Salcido: cell ([phone])  Tena Emmanuel DNP: cell ([phone])  Alexander Donnelly NP: cell ([phone])   Anabel Goyal NP: PATRICIA Aleman LMSW: cell ([phone])     OVERNIGHT EVENTS:    Present Symptoms:   Pain:   Fatigue:  Nausea:  Lack of Appetite:   SOB:  Depression:  Anxiety:  Review of Systems: [All others negative or Unable to obtain due to poor mentation]    MEDICATIONS  (STANDING):  ammonium lactate 12% Lotion 1 Application(s) Topical <User Schedule>  cefTRIAXone   IVPB 1000 milliGRAM(s) IV Intermittent every 24 hours  chlorproMAZINE    Tablet 50 milliGRAM(s) Oral three times a day  clopidogrel Tablet 75 milliGRAM(s) Oral daily  enoxaparin Injectable 40 milliGRAM(s) SubCutaneous every 24 hours  famotidine    Tablet 20 milliGRAM(s) Oral daily  ferrous    sulfate 325 milliGRAM(s) Oral daily  latanoprost 0.005% Ophthalmic Solution 1 Drop(s) Both EYES at bedtime  metoprolol tartrate 50 milliGRAM(s) Oral two times a day  polyethylene glycol 3350 17 Gram(s) Oral two times a day  senna 2 Tablet(s) Oral at bedtime  simvastatin 10 milliGRAM(s) Oral at bedtime    MEDICATIONS  (PRN):  acetaminophen     Tablet .. 650 milliGRAM(s) Oral every 6 hours PRN Temp greater or equal to 38C (100.4F), Mild Pain (1 - 3)  bisacodyl Suppository 10 milliGRAM(s) Rectal daily PRN Constipation  melatonin 3 milliGRAM(s) Oral at bedtime PRN Insomnia  ondansetron Injectable 4 milliGRAM(s) IV Push every 8 hours PRN Nausea and/or Vomiting      PHYSICAL EXAM:  Vital Signs Last 24 Hrs  T(C): 36.4 (07 Apr 2023 14:07), Max: 36.8 (07 Apr 2023 05:15)  T(F): 97.5 (07 Apr 2023 14:07), Max: 98.2 (07 Apr 2023 05:15)  HR: 99 (07 Apr 2023 18:17) (83 - 99)  BP: 130/89 (07 Apr 2023 18:17) (125/78 - 148/93)  BP(mean): --  RR: 16 (07 Apr 2023 18:17) (16 - 18)  SpO2: 98% (07 Apr 2023 18:17) (93% - 98%)    Parameters below as of 07 Apr 2023 18:17  Patient On (Oxygen Delivery Method): room air        General: alert  oriented x ____    lethargic distressed cachexia  verbal nonverbal  unarousable     Palliative Performance Scale/Karnofsky Score:  ECOG Performance:    HEENT: no abnormal lesion, dry mouth  ET tube/trach oral lesions:  Lungs: tachypnea/labored breathing, audible excessive secretions  CV: RRR, S1S2, tachycardia  GI: soft non distended non tender  incontinent               PEG/NG/OG tube  constipation  last BM:   : incontinent  oliguria/anuria  escalante  Musculoskeletal: weakness x4 edema x4    ambulatory with assistance   mostly/fully bedbound/wheelchair bound  Skin: no abnormal skin lesions, poor skin turgor, pressure ulcers stage:   Neuro: no deficits, mild cognitive impairment dsyphagia/dysarthria paresis  Oral intake ability: unable/only mouth care, minimal moderate full capability    LABS:                RADIOLOGY & ADDITIONAL STUDIES: follow up on:  complex medical decision making related to goals of care    [facility] Geriatric and Palliative Consult Service:  Dr. Jo Ann Oconnell: cell ([phone])  Dr. Celso Salcido: cell ([phone])  Tena Emmanuel DNP: cell ([phone])  Alexander Donnelly NP: cell ([phone])   Anabel Goyal NP: PATRICIA Aleman LMSW: cell ([phone])     INTERIM HX:  Patient more cooperative and willing to participate on the discussion. See GOC conversation.    OVERNIGHT EVENTS:    Present Symptoms:   Pain: denies  Fatigue: denies   Nausea: denies  Lack of Appetite: denies   SOB: denies  Depression: denies  Anxiety: denies    Review of Systems: [All others negative]      MEDICATIONS  (STANDING):  ammonium lactate 12% Lotion 1 Application(s) Topical <User Schedule>  cefTRIAXone   IVPB 1000 milliGRAM(s) IV Intermittent every 24 hours  chlorproMAZINE    Tablet 50 milliGRAM(s) Oral three times a day  clopidogrel Tablet 75 milliGRAM(s) Oral daily  enoxaparin Injectable 40 milliGRAM(s) SubCutaneous every 24 hours  famotidine    Tablet 20 milliGRAM(s) Oral daily  ferrous    sulfate 325 milliGRAM(s) Oral daily  latanoprost 0.005% Ophthalmic Solution 1 Drop(s) Both EYES at bedtime  metoprolol tartrate 50 milliGRAM(s) Oral two times a day  polyethylene glycol 3350 17 Gram(s) Oral two times a day  senna 2 Tablet(s) Oral at bedtime  simvastatin 10 milliGRAM(s) Oral at bedtime    MEDICATIONS  (PRN):  acetaminophen     Tablet .. 650 milliGRAM(s) Oral every 6 hours PRN Temp greater or equal to 38C (100.4F), Mild Pain (1 - 3)  bisacodyl Suppository 10 milliGRAM(s) Rectal daily PRN Constipation  melatonin 3 milliGRAM(s) Oral at bedtime PRN Insomnia  ondansetron Injectable 4 milliGRAM(s) IV Push every 8 hours PRN Nausea and/or Vomiting      PHYSICAL EXAM:  Vital Signs Last 24 Hrs  T(C): 36.4 (07 Apr 2023 14:07), Max: 36.8 (07 Apr 2023 05:15)  T(F): 97.5 (07 Apr 2023 14:07), Max: 98.2 (07 Apr 2023 05:15)  HR: 99 (07 Apr 2023 18:17) (83 - 99)  BP: 130/89 (07 Apr 2023 18:17) (125/78 - 148/93)  BP(mean): --  RR: 16 (07 Apr 2023 18:17) (16 - 18)  SpO2: 98% (07 Apr 2023 18:17) (93% - 98%)    Parameters below as of 07 Apr 2023 18:17  Patient On (Oxygen Delivery Method): room air    General: appears chronically ill, in NAD    Palliative Performance Scale/Karnofsky Score: 40%    HEENT: conjunctivae clear, dry mouth   Lungs: unlabored breathing, comfortable on room air  CV: RRR, S1S2   GI: soft, non distended, non tender, continent   : continent, voiding freely  Musculoskeletal: weakness x4, no edema x4, ambulatory   Skin: warm, dry, good skin turgor   Neuro: A&O x3, verbal, no deficits  Psych: moderately suspicious/ paranoid, uncooperative  Oral intake ability: regular (DASH/TLC), full capability    LABS:        RADIOLOGY & ADDITIONAL STUDIES:

## 2023-04-07 NOTE — CONSULT NOTE ADULT - SUBJECTIVE AND OBJECTIVE BOX
[facility] Geriatric and Palliative Consult Service:  Dr. Jo Ann Oconnell: cell ([phone])  Dr. Celso Salcido: cell ([phone])   Tena Emmanuel DNP: cell ([phone])  Alexander Donnelly NP: cell ([phone])   Anabel Goyal NP: PATRICIA Aleman LMSW: cell ([phone])     HPI:  Patient is a 73 male, uses cane, from [facility] with PMHx of Schizophrenia, CHF, AICD, GERD, HTN, HLD, Vertigo, who was sent from assisted living to be evaluated for generalized weakness and tachycardia. He has been having generalized weakness and tachycardia for 2 days. Patient reporta having dysuria and fever for 1 month. He denies any other symptoms. He endorses chronic constipation(last BM yesterday) and chronic lower back pain. Patient denies headache, nausea, vomit, chest pain, shortness of breath, cough, lightheadedness, abdominal pain, diarrhea, dark/bloody stool, hematuria, loss of strength, or loss of sensation. (04 Apr 2023 16:44)      PAST MEDICAL & SURGICAL HISTORY:  HTN      Schizophrenia      CHF (congestive heart failure)      HLD (hyperlipidemia)      Vertigo      GERD (gastroesophageal reflux disease)      Hydrocele  Hydrocele removal 2007      S/P PTCA (Percutaneous Transluminal Coronary Angioplasty)      AICD (automatic cardioverter/defibrillator) present          SOCIAL HISTORY:    Admitted from:  home  (with HHA)           assisted living          [facility]   [ none ] Substance abuse, [ none ] Tobacco hx, [ none ] Alcohol hx, [ none ] Home Opioid Hx    FAMILY HISTORY:  No pertinent family history in first degree relatives     unable to obtain from patient due to poor mentation, family unable to give information, see H&P for history  Baseline ADLs (prior to admission):    Allergies    No Known Allergies    Intolerances      Present Symptoms:   Pain:  Fatigue:  Nausea:  Lack of Appetite:   SOB:  Depression:  Anxiety:  Review of Systems: [All others negative or Unable to obtain due to poor mentation]    MEDICATIONS  (STANDING):  ammonium lactate 12% Lotion 1 Application(s) Topical <User Schedule>  cefTRIAXone   IVPB 1000 milliGRAM(s) IV Intermittent every 24 hours  chlorproMAZINE    Tablet 50 milliGRAM(s) Oral three times a day  clopidogrel Tablet 75 milliGRAM(s) Oral daily  enoxaparin Injectable 40 milliGRAM(s) SubCutaneous every 24 hours  famotidine    Tablet 20 milliGRAM(s) Oral daily  ferrous    sulfate 325 milliGRAM(s) Oral daily  latanoprost 0.005% Ophthalmic Solution 1 Drop(s) Both EYES at bedtime  metoprolol tartrate 50 milliGRAM(s) Oral two times a day  polyethylene glycol 3350 17 Gram(s) Oral two times a day  senna 2 Tablet(s) Oral at bedtime  simvastatin 10 milliGRAM(s) Oral at bedtime    MEDICATIONS  (PRN):  acetaminophen     Tablet .. 650 milliGRAM(s) Oral every 6 hours PRN Temp greater or equal to 38C (100.4F), Mild Pain (1 - 3)  bisacodyl Suppository 10 milliGRAM(s) Rectal daily PRN Constipation  melatonin 3 milliGRAM(s) Oral at bedtime PRN Insomnia  ondansetron Injectable 4 milliGRAM(s) IV Push every 8 hours PRN Nausea and/or Vomiting      PHYSICAL EXAM:  Vital Signs Last 24 Hrs  T(C): 36.4 (07 Apr 2023 14:07), Max: 36.8 (07 Apr 2023 05:15)  T(F): 97.5 (07 Apr 2023 14:07), Max: 98.2 (07 Apr 2023 05:15)  HR: 99 (07 Apr 2023 18:17) (83 - 99)  BP: 130/89 (07 Apr 2023 18:17) (125/78 - 148/93)  BP(mean): --  RR: 16 (07 Apr 2023 18:17) (16 - 18)  SpO2: 98% (07 Apr 2023 18:17) (93% - 98%)    Parameters below as of 07 Apr 2023 18:17  Patient On (Oxygen Delivery Method): room air        General: alert  oriented x ____    lethargic distressed cachexia  nonverbal  unarousable verbal    Palliative Performance Scale/Karnofsky Score:  ECOG Performance:    HEENT: no abnormal lesion, dry mouth  ET tube/trach oral lesions:  Lungs: tachypnea/labored breathing, audible excessive secretions  CV: RRR, S1S2, tachycardia  GI: soft non distended non tender  incontinent               PEG/NG/OG tube  constipation  last BM:   : incontinent  oliguria/anuria  escalante  Musculoskeletal: weakness x4 edema x4    ambulatory with assistance   mostly/fully bedbound/wheelchair bound  Skin: no abnormal skin lesions, poor skin turgor, pressure ulcer stage:   Neuro: no deficits, mild cognitive impairment dsyphagia/dysarthria paresis  Oral intake ability: unable/only mouth care, minimal moderate full capability    LABS:              RADIOLOGY & ADDITIONAL STUDIES:

## 2023-04-07 NOTE — PROGRESS NOTE ADULT - SUBJECTIVE AND OBJECTIVE BOX
PULMONARY  progress note    CARTER MOHAN  [id number]    Patient is a 73y old  Male who presents with a chief complaint of Severe sepsis due to UTI (06 Apr 2023 18:39)  Feels better, no cough sob or dysuria  Admits being ex smoker x 22 years      MEDICATIONS  (STANDING):  ammonium lactate 12% Lotion 1 Application(s) Topical <User Schedule>  cefTRIAXone   IVPB 1000 milliGRAM(s) IV Intermittent every 24 hours  chlorproMAZINE    Tablet 50 milliGRAM(s) Oral three times a day  clopidogrel Tablet 75 milliGRAM(s) Oral daily  enoxaparin Injectable 40 milliGRAM(s) SubCutaneous every 24 hours  famotidine    Tablet 20 milliGRAM(s) Oral daily  ferrous    sulfate 325 milliGRAM(s) Oral daily  latanoprost 0.005% Ophthalmic Solution 1 Drop(s) Both EYES at bedtime  metoprolol tartrate 50 milliGRAM(s) Oral two times a day  polyethylene glycol 3350 17 Gram(s) Oral two times a day  senna 2 Tablet(s) Oral at bedtime  simvastatin 10 milliGRAM(s) Oral at bedtime      MEDICATIONS  (PRN):  acetaminophen     Tablet .. 650 milliGRAM(s) Oral every 6 hours PRN Temp greater or equal to 38C (100.4F), Mild Pain (1 - 3)  bisacodyl Suppository 10 milliGRAM(s) Rectal daily PRN Constipation  melatonin 3 milliGRAM(s) Oral at bedtime PRN Insomnia  ondansetron Injectable 4 milliGRAM(s) IV Push every 8 hours PRN Nausea and/or Vomiting      Allergies    No Known Allergies            PAST MEDICAL & SURGICAL HISTORY:  HTN      Schizophrenia      CHF (congestive heart failure)      HLD (hyperlipidemia)      Vertigo      GERD (gastroesophageal reflux disease)      Hydrocele  Hydrocele removal 2007      S/P PTCA (Percutaneous Transluminal Coronary Angioplasty)      AICD (automatic cardioverter/defibrillator) present               REVIEW OF SYSTEMS:  CONSTITUTIONAL: No fever, weight loss, or fatigue   EYES: No eye pain, visual disturbances, or discharge  ENT:  No difficulty hearing, tinnitus, vertigo; No sinus or throat pain  NECK: No pain or stiffness or nodes  RESPIRATORY:  cough--   wheezing--   chills--   hemoptysis--  Shortness of Breath--  CARDIOVASCULAR: No chest pain, palpitations, passing out, dizziness, or leg swelling  GASTROINTESTINAL: No abdominal or epigastric pain. No nausea, vomiting, or hematemesis;  GENITOURINARY: No dysuria, frequency, hematuria, or incontinence  NEUROLOGICAL: No headaches, memory loss, loss of strength, numbness, or tremors  SKIN: No itching, burning, rashes, or lesions   LYMPH Nodes: No enlarged glands  ENDOCRINE: No heat or cold intolerance; No hair loss  MUSCULOSKELETAL: No joint pain or swelling; No muscle, back, or extremity pain  HEME/LYMPH: No easy bruising, or bleeding gums  ALLERGY AND IMMUNOLOGIC: No hives or eczema        Vital Signs Last 24 Hrs  T(C): 36.8 (07 Apr 2023 05:15), Max: 37.1 (06 Apr 2023 13:16)  T(F): 98.2 (07 Apr 2023 05:15), Max: 98.8 (06 Apr 2023 13:16)  HR: 99 (07 Apr 2023 05:15) (99 - 111)  BP: 148/93 (07 Apr 2023 05:15) (148/93 - 154/96)  BP(mean): --  RR: 18 (07 Apr 2023 05:15) (18 - 19)  SpO2: 93% (07 Apr 2023 05:15) (91% - 93%)    Parameters below as of 07 Apr 2023 05:15  Patient On (Oxygen Delivery Method): room air      I&O's Detail      PHYSICAL EXAMINATION:    GENERAL: The patient is a well-developed, obese w/m in no distress  SKIN no rash ecchymoses or bruises  HEENT: Head is normocephalic and atraumatic  ANTHONY , Extraocular muscles are intact. Mucous membranes  moist.   Neck supple ,No LN felt JVP not increased  Thyroid not enlarged  Cardiovascular:  S1 S2 heard, RSR, No JVD , systolic  murmur at apex, No gallop or rub, AICD lt infra clavicular area  Respiratory: Chest wall symmetrical with good air entry ,Percussion note normal,    Lungs vesicular breathing with    rales    wheeze	  ABDOMEN:  Soft, Non-tender,  no hepatomegaly or splenomegaly BS positive	  Extremities: Normal range of motion, No clubbing, cyanosis or edema  Vascular: Peripheral pulses palpable 2+ bilaterally  CNS:  Alert and oriented x3  Cranial nerves intact  sensory intact  motor power5/5  dtr 2+  Babinski neg        LABS:        MICROBIOLOGY:    Culture - Urine (collected 04-04-23 @ 14:35)  Source: Clean Catch Clean Catch (Midstream)  Final Report (04-06-23 @ 22:42):    >100,000 CFU/ml Escherichia coli  Organism: Escherichia coli (04-06-23 @ 22:42)  Organism: Escherichia coli (04-06-23 @ 22:42)      Method Type: ANTOINETTE      -  Amikacin: S <=16      -  Amoxicillin/Clavulanic Acid: S <=8/4      -  Ampicillin: S <=8 These ampicillin results predict results for amoxicillin      -  Ampicillin/Sulbactam: S <=4/2 Enterobacter, Klebsiella aerogenes, Citrobacter, and Serratia may develop resistance during prolonged therapy (3-4 days)      -  Aztreonam: S <=4      -  Cefazolin: S <=2 For uncomplicated UTI with K. pneumoniae, E. coli, or P. mirablis: ANTOINETTE <=16 is sensitive and ANTOINETTE >=32 is resistant. This also predicts results for oral agents cefaclor, cefdinir, cefpodoxime, cefprozil, cefuroxime axetil, cephalexin and locarbef for uncomplicated UTI. Note that some isolates may be susceptible to these agents while testing resistant to cefazolin.      -  Cefepime: S <=2      -  Cefoxitin: S <=8      -  Ceftriaxone: S <=1 Enterobacter, Klebsiella aerogenes, Citrobacter, and Serratia may develop resistance during prolonged therapy      -  Cefuroxime: S <=4      -  Ciprofloxacin: S <=0.25      -  Ertapenem: S <=0.5      -  Gentamicin: S <=2      -  Imipenem: S <=1      -  Levofloxacin: S <=0.5      -  Meropenem: S <=1      -  Nitrofurantoin: S <=32 Should not be used to treat pyelonephritis      -  Piperacillin/Tazobactam: S <=8      -  Tobramycin: S <=2      -  Trimethoprim/Sulfamethoxazole: S <=0.5/9.5    Culture - Blood (collected 04-04-23 @ 13:10)  Source: .Blood Blood-Peripheral  Preliminary Report (04-05-23 @ 18:02):    No growth to date.    Culture - Blood (collected 04-04-23 @ 13:00)  Source: .Blood Blood-Peripheral  Preliminary Report (04-05-23 @ 18:02):    No growth to date.       Alert and oriented, no focal deficits, no motor or sensory deficits.

## 2023-04-07 NOTE — BH CONSULTATION LIAISON ASSESSMENT NOTE - NSBHCHARTREVIEWVS_PSY_A_CORE FT
Vital Signs Last 24 Hrs  T(C): 36.4 (07 Apr 2023 14:07), Max: 36.8 (07 Apr 2023 05:15)  T(F): 97.5 (07 Apr 2023 14:07), Max: 98.2 (07 Apr 2023 05:15)  HR: 83 (07 Apr 2023 14:07) (83 - 99)  BP: 125/78 (07 Apr 2023 14:07) (125/78 - 148/93)  BP(mean): --  RR: 18 (07 Apr 2023 14:07) (18 - 18)  SpO2: 94% (07 Apr 2023 14:07) (93% - 94%)    Parameters below as of 07 Apr 2023 14:07  Patient On (Oxygen Delivery Method): room air

## 2023-04-07 NOTE — PROGRESS NOTE ADULT - CONVERSATION DETAILS
Met patient again today and asked if he would be willing to return to the [facility] discussion initiated yesterday. Patient is calmer and willing to participate. He was asked to think about someone and he stated that he asked the Psychiatrist but he did not want to be the one. He started laughing. I explained to him that the HCP is usually a family member or a friend who he trusts the most. He wanted me to speak to his brother Bernabe because he would not like to e a burden to him. I called Bernabe and he agreed to be patient HCP. Patient asked to speak with him and told him: "Bernabe, if I would need extraordinary care, let me die!" he repeated it 3 times... "You know me, Bernabe.. in that case, let me die!"     When I stated talking about MOLST orders, patient became a little agitated and uncooperative. He no longer wanted to engage in the conversation and I told him that it was okay.     Diogenes communicated with Primary Care team. Met patient again today and asked if he would be willing to return to the [facility] discussion initiated yesterday. Patient is calmer and willing to participate. He was asked to think about someone and he stated that he asked the Psychiatrist but he did not want to be the one. He started laughing. I explained to him that the HCP is usually a family member or a friend who he trusts the most. He wanted me to speak to his brother Bernabe because he himself would not like to be a burden to him. I called Bernabe and he agreed to be patient's HCP. Patient asked to speak with Bernabe and they had a good conversation. Patient at certain point told him: "Bernabe, if I would need extraordinary care, let me die!" he repeated it a few times and said: "You know me, Bernabe.. in that case, let me die!"     When I stated talking to him about the MOLST orders, patient became a little agitated and uncooperative. He no longer wanted to engage in the conversation and I told him that it was okay. Emotional support provided. I made a hard copy of the HCP and handed to him.     Diogenes communicated with Primary Care team.

## 2023-04-07 NOTE — BH CONSULTATION LIAISON ASSESSMENT NOTE - OTHER PAST PSYCHIATRIC HISTORY (INCLUDE DETAILS REGARDING ONSET, COURSE OF ILLNESS, INPATIENT/OUTPATIENT TREATMENT)
Multiple past hospitalizations, cannot specify last time. On Thorazine for a long time. Denies any past SA's.

## 2023-04-07 NOTE — BH CONSULTATION LIAISON ASSESSMENT NOTE - SUMMARY
74 y/o M with a hx of schizophrenia, HTN, HLD, CHF, AICD, GERD, and vertigo, who is admitted due to severe sepsis secondary to a UTI. He is consulted for concerns regarding his underlying psychotic disorder. Patient with what appears to be chronic schizophrenia. Although he is probably close to his baseline, his increased paranoid delusions could be related to a recent decrease in his Thorazine, as he himself reports. He also seems to have some idiosyncratic beliefs about his health, and about medical conditions in general, that although potentially a part of his underlying psychotic condition, at times could also limit his level of insight. His subtle symptoms might improve with an adjustment of his antipsychotic medication. He is not suicidal or homicidal.    -Consider adjusting his Thorazine to 50 mg AM, 50 mg PM and 75 mg HS  -PRN: Thorazine 25 mg IM q 8 hrs PRN for agitation/worsening psychosis  -No need for an inpatient psychiatric admission or 1:1; observation status as per primary team  -[facility] Care f/u  - f/u  -Case discussed with primary team  -Service will follow as needed

## 2023-04-07 NOTE — BH CONSULTATION LIAISON ASSESSMENT NOTE - NSBHCHARTREVIEWINVESTIGATE_PSY_A_CORE FT
< from: 12 Lead ECG (04.04.23 @ 13:14) >    Ventricular Rate 93 BPM    Atrial Rate 93 BPM    P-R Interval 164 ms    QRS Duration 114 ms    Q-T Interval 368 ms    QTC Calculation(Bazett) 457 ms    P Axis 92 degrees    R Axis 160 degrees    T Axis 77 degrees    Diagnosis Line Normal sinus rhythm      Confirmed by JOHN CHAN, [facility] ([id number]) on 4/6/2023 7:13:03 AM    < end of copied text >

## 2023-04-08 DIAGNOSIS — K59.09 OTHER CONSTIPATION: ICD-10-CM

## 2023-04-08 DIAGNOSIS — R53.81 OTHER MALAISE: ICD-10-CM

## 2023-04-08 DIAGNOSIS — Z51.5 ENCOUNTER FOR PALLIATIVE CARE: ICD-10-CM

## 2023-04-08 RX ADMIN — Medication 50 MILLIGRAM(S): at 21:33

## 2023-04-08 RX ADMIN — Medication 50 MILLIGRAM(S): at 14:15

## 2023-04-08 RX ADMIN — Medication 50 MILLIGRAM(S): at 05:24

## 2023-04-08 NOTE — PROGRESS NOTE ADULT - SUBJECTIVE AND OBJECTIVE BOX
PULMONARY  progress note    CARTER MOHAN  MRN-[id number]    Patient is a 73y old  Male who presents with a chief complaint of Severe sepsis due to UTI (07 Apr 2023 16:00)  no cough or sob      MEDICATIONS  (STANDING):  ammonium lactate 12% Lotion 1 Application(s) Topical <User Schedule>  cefTRIAXone   IVPB 1000 milliGRAM(s) IV Intermittent every 24 hours  chlorproMAZINE    Tablet 50 milliGRAM(s) Oral three times a day  clopidogrel Tablet 75 milliGRAM(s) Oral daily  enoxaparin Injectable 40 milliGRAM(s) SubCutaneous every 24 hours  famotidine    Tablet 20 milliGRAM(s) Oral daily  ferrous    sulfate 325 milliGRAM(s) Oral daily  latanoprost 0.005% Ophthalmic Solution 1 Drop(s) Both EYES at bedtime  metoprolol tartrate 50 milliGRAM(s) Oral two times a day  polyethylene glycol 3350 17 Gram(s) Oral two times a day  senna 2 Tablet(s) Oral at bedtime  simvastatin 10 milliGRAM(s) Oral at bedtime    Allergies    No Known Allergies            PAST MEDICAL & SURGICAL HISTORY:  HTN  Schizophrenia  CHF (congestive heart failure)  HLD (hyperlipidemia)  Vertigo  GERD (gastroesophageal reflux disease)  Hydrocele removal 2007  S/P PTCA (Percutaneous Transluminal Coronary Angioplasty)  AICD (automatic cardioverter/defibrillator) present               REVIEW OF SYSTEMS:  CONSTITUTIONAL: No fever, weight loss, or fatigue   EYES: No eye pain, visual disturbances, or discharge  ENT:  No difficulty hearing, tinnitus, vertigo; No sinus or throat pain  NECK: No pain or stiffness or nodes  RESPIRATORY:  cough--   wheezing--   chills--   hemoptysis--  Shortness of Breath--  CARDIOVASCULAR: No chest pain, palpitations, passing out, dizziness, or leg swelling  GASTROINTESTINAL: No abdominal or epigastric pain. No nausea, vomiting, or hematemesis;   GENITOURINARY: No dysuria, frequency, hematuria, or incontinence  NEUROLOGICAL: No headaches, memory loss, loss of strength, numbness, or tremors  SKIN: No itching, burning, rashes, or lesions   LYMPH Nodes: No enlarged glands  ENDOCRINE: No heat or cold intolerance; No hair loss  ALLERGY AND IMMUNOLOGIC: No hives or eczema      Vital Signs Last 24 Hrs  T(C): 37.1 (08 Apr 2023 13:52), Max: 37.1 (08 Apr 2023 05:02)  T(F): 98.8 (08 Apr 2023 13:52), Max: 98.8 (08 Apr 2023 05:02)  HR: 86 (08 Apr 2023 13:52) (86 - 99)  BP: 144/91 (08 Apr 2023 13:52) (124/68 - 144/91)  BP(mean): --  RR: 18 (08 Apr 2023 13:52) (16 - 18)  SpO2: 97% (08 Apr 2023 13:52) (95% - 98%)      PHYSICAL EXAMINATION:    GENERAL: The patient is a well-developed, obese w/m in no apparent distress.   SKIN no rash ecchymoses or bruises  HEENT: Head is normocephalic and atraumatic  ANTHONY , Extraocular muscles are intact. Mucous membranes  moist.   Neck supple ,No LN felt JVP not increased  Thyroid not enlarged  Cardiovascular:  S1 S2 heard, RSR, No JVD , systolic  murmur at apex, No gallop or rub  Respiratory: Chest wall symmetrical with good air entry ,Percussion note normal,    Lungs vesicular breathing with  no  rales or  wheeze	  ABDOMEN:  Soft, Non-tender, obese,  no hepatomegaly or splenomegaly BS positive	  Extremities: Normal range of motion, No clubbing, cyanosis or edema  Vascular: Peripheral pulses palpable 2+ bilaterally  CNS:  Alert and oriented x3  Cranial nerves intact  sensory intact  motor power5/5  dtr 2+  Babinski neg        MICROBIOLOGY:    Culture - Urine (collected 04-04-23 @ 14:35)  Source: Clean Catch Clean Catch (Midstream)  Final Report (04-06-23 @ 22:42):    >100,000 CFU/ml Escherichia coli  Organism: Escherichia coli (04-06-23 @ 22:42)  Organism: Escherichia coli (04-06-23 @ 22:42)      Method Type: ANTOINETTE      -  Amikacin: S <=16      -  Amoxicillin/Clavulanic Acid: S <=8/4      -  Ampicillin: S <=8 These ampicillin results predict results for amoxicillin      -  Ampicillin/Sulbactam: S <=4/2 Enterobacter, Klebsiella aerogenes, Citrobacter, and Serratia may develop resistance during prolonged therapy (3-4 days)      -  Aztreonam: S <=4      -  Cefazolin: S <=2 For uncomplicated UTI with K. pneumoniae, E. coli, or P. mirablis: ANTOINETTE <=16 is sensitive and ANTOINETTE >=32 is resistant. This also predicts results for oral agents cefaclor, cefdinir, cefpodoxime, cefprozil, cefuroxime axetil, cephalexin and locarbef for uncomplicated UTI. Note that some isolates may be susceptible to these agents while testing resistant to cefazolin.      -  Cefepime: S <=2      -  Cefoxitin: S <=8      -  Ceftriaxone: S <=1 Enterobacter, Klebsiella aerogenes, Citrobacter, and Serratia may develop resistance during prolonged therapy      -  Cefuroxime: S <=4      -  Ciprofloxacin: S <=0.25      -  Ertapenem: S <=0.5      -  Gentamicin: S <=2      -  Imipenem: S <=1      -  Levofloxacin: S <=0.5      -  Meropenem: S <=1      -  Nitrofurantoin: S <=32 Should not be used to treat pyelonephritis      -  Piperacillin/Tazobactam: S <=8      -  Tobramycin: S <=2      -  Trimethoprim/Sulfamethoxazole: S <=0.5/9.5    Culture - Blood (collected 04-04-23 @ 13:10)  Source: .Blood Blood-Peripheral  Preliminary Report (04-05-23 @ 18:02):    No growth to date.    Culture - Blood (collected 04-04-23 @ 13:00)  Source: .Blood Blood-Peripheral  Preliminary Report (04-05-23 @ 18:02):    No growth to date.

## 2023-04-08 NOTE — PROGRESS NOTE ADULT - PROBLEM SELECTOR PLAN 12
from [facility]   PT eval- pt refuses until he gets better in terms of UTI tx
from [facility]   PT eval- pt refuses until he gets better in terms of UTI tx
DVT ppx: Lovenox   GI ppx: Pepcid    Pt from [facility]. F/u PT eval.   CM following .   F/u ID consult
DVT ppx: Heparin Sc  GI ppx: Pepcid
DVT ppx: Heparin Sc  GI ppx: Pepcid
DVT ppx: Lovenox   GI ppx: Pepcid    Pt from [facility]. F/u PT eval.   CM following .   F/u ID consult
from [facility]   PT eval- pt refuses until he gets better in terms of UTI tx

## 2023-04-08 NOTE — PROGRESS NOTE ADULT - PROBLEM SELECTOR PLAN 6
hypovolemic hyponatremia   likely due to poor po intake  s/p IVF  Stable  Monitor
hypovolemic hyponatremia   likely due to poor po intake  s/p IVF  Stable  Monitor. Pt refused labs today.
hypovolemic hyponatremia   likely due to poor po intake  s/p IVF  no f/u blood work is available due to refusal  asymptomatic
hypovolemic hyponatremia   likely due to poor po intake  s/p IVF  Stable  Monitor
hypovolemic hyponatremia   likely due to poor po intake  s/p IVF  Stable  Monitor. Pt refused labs today.
hypovolemic hyponatremia   likely due to poor po intake  s/p IVF  no f/u blood work is available due to refusal  asymptomatic
hypovolemic hyponatremia   likely due to poor po intake  s/p IVF  no f/u blood work is available due to refusal  asymptomatic

## 2023-04-08 NOTE — PROGRESS NOTE ADULT - PROBLEM SELECTOR PLAN 5
has  AICD  not in exacerbation   appears euvolemic  Continue metoprolol   Continue plavix, statin.
with h/o AICD  not in exacerbation   appears euvolemic  Continue metoprolol   Continue plavix, statin
has  AICD  not in exacerbation   appears euvolemic  Continue metoprolol   Continue plavix, statin.
with h/o AICD  not in exacerbation   appears euvolemic  Continue metoprolol   Continue plavix, statin
has  AICD  not in exacerbation   appears euvolemic  Continue metoprolol   Continue plavix, statin.

## 2023-04-08 NOTE — PROGRESS NOTE ADULT - PROBLEM SELECTOR PROBLEM 10
GERD (gastroesophageal reflux disease)
GERD (gastroesophageal reflux disease)
HLD (hyperlipidemia)
GERD (gastroesophageal reflux disease)
GERD (gastroesophageal reflux disease)
HLD (hyperlipidemia)
HLD (hyperlipidemia)

## 2023-04-08 NOTE — PROGRESS NOTE ADULT - PROBLEM SELECTOR PLAN 11
DVT ppx: Lovenox   GI ppx: Pepcid
Continue statin
Continue statin
DVT ppx: Lovenox   GI ppx: Pepcid
Continue statin
Continue statin
DVT ppx: Lovenox   GI ppx: Pepcid

## 2023-04-08 NOTE — PROGRESS NOTE ADULT - PROBLEM SELECTOR PROBLEM 2
Acute respiratory failure with hypoxia
Physical debility
Acute respiratory failure with hypoxia

## 2023-04-08 NOTE — PROGRESS NOTE ADULT - PROBLEM SELECTOR PROBLEM 1
Severe sepsis with lactic acidosis
Schizophrenia
Severe sepsis with lactic acidosis

## 2023-04-08 NOTE — PROGRESS NOTE ADULT - PROBLEM SELECTOR PROBLEM 12
Prophylactic measure
Prophylactic measure
Discharge planning issues
Prophylactic measure
Prophylactic measure

## 2023-04-08 NOTE — PROGRESS NOTE ADULT - PROBLEM SELECTOR PLAN 3
UA positive  UC growing Ecoli  Continue Ceftriaxone, Day 3/7  F/u UC sensitivities  F/u consult with ID Dr. Murillo
UA positive  UC growing Ecoli  Continue Ceftriaxone, Day 4/7  F/u consult with ID Dr. Murillo
UA positive  UC growing Ecoli  Continue Ceftriaxone, Day 3/7  F/u UC sensitivities  F/u consult with ID Dr. Murillo
UA positive  Continue Ceftriaxone, Day 2/7  F/u UC
UA positive  UC growing Ecoli  Continue Ceftriaxone, Day 4/7  F/u consult with ID Dr. Murillo
c/w Polyethylene glycol 3350 17 Gram(s) Oral two times a day  c/w Senna 2 Tablet(s) Oral at bedtime  c/w Bisacodyl Suppository 10 milliGRAM(s) Rectal daily PRN Constipation
UA positive  Continue Ceftriaxone, Day 2/7  F/u UC
UA positive  UC growing Ecoli  Continue Ceftriaxone, Day 4/7  F/u consult with ID Dr. Murillo

## 2023-04-08 NOTE — PROGRESS NOTE ADULT - SUBJECTIVE AND OBJECTIVE BOX
PATIENT SEEN AND EXAMINED ON :- 4/8/23  DATE OF SERVICE:  4/8/23           Interim events noted,Labs ,Radiological studies and Cardiology tests reviewed .    MR#[id number]  PATIENT NAME:[facility] COURSE: HPI:  Patient is a 73 male, uses cane, from [facility] with PMHx of Schizophrenia, CHF, AICD, GERD, HTN, HLD, Vertigo, who was sent from assisted living to be evaluated for generalized weakness and tachycardia. He has been having generalized weakness and tachycardia for 2 days. Patient reporta having dysuria and fever for 1 month. He denies any other symptoms. He endorses chronic constipation(last BM yesterday) and chronic lower back pain. Patient denies headache, nausea, vomit, chest pain, shortness of breath, cough, lightheadedness, abdominal pain, diarrhea, dark/bloody stool, hematuria, loss of strength, or loss of sensation. (04 Apr 2023 16:44)      INTERIM EVENTS:Patient seen at bedside ,interim events noted.      PMH -reviewed admission note, no change since admission  HEART FAILURE: Acute[ ]Chronic[ ] Systolic[ ] Diastolic[ ] Combined Systolic and Diastolic[ ]  CAD[ ] CABG[ ] PCI[ ]  DEVICES[ ] PPM[ ] ICD[ ] ILR[ ]  ATRIAL FIBRILLATION[ ] Paroxysmal[ ] Permanent[ ] CHADS2-[  ]  IRMA[ ] CKD1[ ] CKD2[ ] CKD3[ ] CKD4[ ] ESRD[ ]  COPD[ ] HTN[ ]   DM[ ] Type1[ ] Type 2[ ]   CVA[ ] Paresis[ ]    AMBULATION: Assisted[ ] Cane/walker[ ] Independent[ ]    MEDICATIONS  (STANDING):  ammonium lactate 12% Lotion 1 Application(s) Topical <User Schedule>  cefTRIAXone   IVPB 1000 milliGRAM(s) IV Intermittent every 24 hours  chlorproMAZINE    Tablet 50 milliGRAM(s) Oral three times a day  clopidogrel Tablet 75 milliGRAM(s) Oral daily  enoxaparin Injectable 40 milliGRAM(s) SubCutaneous every 24 hours  famotidine    Tablet 20 milliGRAM(s) Oral daily  ferrous    sulfate 325 milliGRAM(s) Oral daily  latanoprost 0.005% Ophthalmic Solution 1 Drop(s) Both EYES at bedtime  metoprolol tartrate 50 milliGRAM(s) Oral two times a day  polyethylene glycol 3350 17 Gram(s) Oral two times a day  senna 2 Tablet(s) Oral at bedtime  simvastatin 10 milliGRAM(s) Oral at bedtime    MEDICATIONS  (PRN):  acetaminophen     Tablet .. 650 milliGRAM(s) Oral every 6 hours PRN Temp greater or equal to 38C (100.4F), Mild Pain (1 - 3)  bisacodyl Suppository 10 milliGRAM(s) Rectal daily PRN Constipation  melatonin 3 milliGRAM(s) Oral at bedtime PRN Insomnia  ondansetron Injectable 4 milliGRAM(s) IV Push every 8 hours PRN Nausea and/or Vomiting            REVIEW OF SYSTEMS:  Constitutional: [ ] fever, [ ]weight loss,  [ ]fatigue [ ]weight gain  Eyes: [ ] visual changes  Respiratory: [ ]shortness of breath;  [ ] cough, [ ]wheezing, [ ]chills, [ ]hemoptysis  Cardiovascular: [ ] chest pain, [ ]palpitations, [ ]dizziness,  [ ]leg swelling[ ]orthopnea[ ]PND  Gastrointestinal: [ ] abdominal pain, [ ]nausea, [ ]vomiting,  [ ]diarrhea [ ]Constipation [ ]Melena  Genitourinary: [ ] dysuria, [ ] hematuria [ ]Mcnamara  Neurologic: [ ] headaches [ ] tremors[ ]weakness [ ]Paralysis Right[ ] Left[ ]  Skin: [ ] itching, [ ]burning, [ ] rashes  Endocrine: [ ] heat or cold intolerance  Musculoskeletal: [ ] joint pain or swelling; [ ] muscle, back, or extremity pain  Psychiatric: [ ] depression, [ ]anxiety, [ ]mood swings, or [ ]difficulty sleeping  Hematologic: [ ] easy bruising, [ ] bleeding gums    [ ] All remaining systems negative except as per above.   [ ]Unable to obtain.  [x] No change in ROS since admission      Vital Signs Last 24 Hrs  T(C): 37.1 (08 Apr 2023 13:52), Max: 37.1 (08 Apr 2023 05:02)  T(F): 98.8 (08 Apr 2023 13:52), Max: 98.8 (08 Apr 2023 05:02)  HR: 86 (08 Apr 2023 13:52) (86 - 89)  BP: 144/91 (08 Apr 2023 13:52) (124/68 - 144/91)  BP(mean): --  RR: 18 (08 Apr 2023 13:52) (17 - 18)  SpO2: 97% (08 Apr 2023 13:52) (95% - 97%)    Parameters below as of 08 Apr 2023 13:52  Patient On (Oxygen Delivery Method): room air      I&O's Summary      PHYSICAL EXAM:  General: No acute distress BMI-  HEENT: EOMI, PERRL  Neck: Supple, [ ] JVD  Lungs: Equal air entry bilaterally; [ ] rales [ ] wheezing [ ] rhonchi  Heart: Regular rate and rhythm; [x ] murmur   2/6 [ x] systolic [ ] diastolic [ ] radiation[ ] rubs [ ]  gallops  Abdomen: Nontender, bowel sounds present  Extremities: No clubbing, cyanosis, [ ] edema [ ]Pulses  equal and intact  Nervous system:  Alert & Oriented X3, no focal deficits  Psychiatric: Normal affect  Skin: No rashes or lesions    LABS:        Creatinine Trend: 1.20<--, 1.27<--, 1.53<--

## 2023-04-08 NOTE — PROGRESS NOTE ADULT - PROBLEM SELECTOR PLAN 8
Continue chlorpromazine per home regimen  Supportive measures.   Safety measures.
Continue chlorpromazine per home regimen  Supportive measures.   Safety measures
Continue chlorpromazine per home regimen  Supportive measures.   Safety measures.
Continue chlorpromazine per home regimen  Supportive measures.   Safety measures.
Continue chlorpromazine per home regimen  Supportive measures.   Safety measures

## 2023-04-08 NOTE — PROGRESS NOTE ADULT - PROBLEM SELECTOR PLAN 7
Hx of chronic anemia on iron tablet daily  Hgb low, stable >10 on admission - > refusing blood work  until he gets better in terms of infection treatment   No overt bleeding.   Continue Ferrous sulfate.
Hx of chronic anemia on iron tablet daily  Hgb low, stable >10 on admission - > refusing blood work  until he gets better in terms of infection treatment   No overt bleeding.   Continue Ferrous sulfate.
Hx of chronic anemia on iron tablet daily  Hgb low, stable >10  No overt bleeding.   Continue Ferrous sulfate
Hx of chronic anemia on iron tablet daily  Hgb low, stable >10  No overt bleeding.   Continue Ferrous sulfate.
Hx of chronic anemia on iron tablet daily  Hgb low, stable >10  No overt bleeding.   Continue Ferrous sulfate
Hx of chronic anemia on iron tablet daily  Hgb low, stable >10 on admission - > refusing blood work  until he gets better in terms of infection treatment   No overt bleeding.   Continue Ferrous sulfate.
Hx of chronic anemia on iron tablet daily  Hgb low, stable >10  No overt bleeding.   Continue Ferrous sulfate.

## 2023-04-08 NOTE — PROGRESS NOTE ADULT - PROBLEM SELECTOR PLAN 1
likely due to Acute  UTI vs other   UA (+) for UTI  CXR finding lungs clear  Lactate elevated, downtrending to 2.1 this morning.   Gentle hydration with NS 250ml IV in setting of h/o CHF.   Continue Ceftriaxone, Day 2/7  F/u BC/UC  F/u CTA chest to eval for PE.
UA (+) for UTI  CXR finding lungs clear  Lactate elevated, downtrending   Gentle hydration with NS 250ml IV in setting of h/o CHF.   Continue Ceftriaxone, Day 3/7  BC NGTD  UC growing Ecoli. Sensitivities pending.   CTA chest neg for PE.   F/u ID consult with Dr. Murillo
UA (+) for UTI  CXR finding lungs clear  Lactate elevated, downtrending   Gentle hydration with NS 250ml IV in setting of h/o CHF.   Continue Ceftriaxone, Day 4/7  BC NGTD  UC growing Ecoli - continue ceftriaxone   CTA chest neg for PE.   F/u ID consult with Dr. Murillo
associated with behavioral disturbances (paranoia and agitation episodes).     Recommendations:  Behavioral Health/Psychiatric evaluation  Outpatient regular follow ups with PCP and Behavioral Health Specialists (Psychiatrist and Psychologist)
UA (+) for UTI  CXR finding lungs clear  Lactate elevated, downtrending   Gentle hydration with NS 250ml IV in setting of h/o CHF.   Continue Ceftriaxone, Day 3/7  BC NGTD  UC growing Ecoli. Sensitivities pending.   CTA chest neg for PE.   F/u ID consult with Dr. Murillo
UA (+) for UTI  CXR finding lungs clear  Lactate elevated, downtrending   Gentle hydration with NS 250ml IV in setting of h/o CHF.   Continue Ceftriaxone, Day 4/7  BC NGTD  UC growing Ecoli - continue ceftriaxone   CTA chest neg for PE.   F/u ID consult with Dr. Murillo
likely due to Acute  UTI vs other   UA (+) for UTI  CXR finding lungs clear  Lactate elevated, downtrending to 2.1 this morning.   Gentle hydration with NS 250ml IV in setting of h/o CHF.   Continue Ceftriaxone, Day 2/7  F/u BC/UC  F/u CTA chest to eval for PE.
UA (+) for UTI  CXR finding lungs clear  Lactate elevated, downtrending   Gentle hydration with NS 250ml IV in setting of h/o CHF.   Continue Ceftriaxone, Day 4/7  BC NGTD  UC growing Ecoli - continue ceftriaxone   CTA chest neg for PE.   F/u ID consult with Dr. Murillo

## 2023-04-08 NOTE — PROGRESS NOTE ADULT - PROBLEM SELECTOR PLAN 4
likely prerenal due to hypoperfusion 2/2 sepsis  s/p IVF   Resolved.   Continue hydration  Monitor BMP.
72 y/o male with multiple comorbidities and deteriorating conditions. Patient is at moderate-high risk for mortality, morbidity, infections, and re-hospitalizations.    Code Status: FULL Code  Able to name a HCP (his brother, as above)    Ximena: 40%    Palliative Care will follow.
likely prerenal due to hypoperfusion 2/2 sepsis  s/p IVF   Resolved.   Continue hydration  Monitor BMP.
likely prerenal due to hypoperfusion 2/2 sepsis  s/p IVF   Resolved.   Continue hydration  Monitor BMP
likely prerenal due to hypoperfusion 2/2 sepsis  s/p IVF   Resolved.   Continue hydration  Monitor BMP

## 2023-04-08 NOTE — PROGRESS NOTE ADULT - PROBLEM SELECTOR PLAN 10
Continue statin
Continue pepcid.
Continue pepcid.
Continue pepcid
Continue statin
Continue pepcid
Continue statin

## 2023-04-08 NOTE — PROGRESS NOTE ADULT - PROBLEM SELECTOR PROBLEM 11
Prophylactic measure
HLD (hyperlipidemia)

## 2023-04-08 NOTE — PROGRESS NOTE ADULT - PROBLEM SELECTOR PROBLEM 4
IRMA (acute kidney injury)
Palliative care encounter
IRMA (acute kidney injury)
IRMA (acute kidney injury)

## 2023-04-08 NOTE — PROGRESS NOTE ADULT - PROBLEM SELECTOR PLAN 2
Hypoxic on RA  Lactate elevated, downtrending  CXR lungs clear  continue supplemental oxygen . Wean as tolerated to keep SpO2 > 92%  Monitor oxygenation.  CTA chest neg PE. Right upper lobe calcified granuloma. Passive and dependent atelectasis in the lower lobes   and lingula. Trace pleural effusions.  OOB /chair , ambulate.  Incentive spirometer.
Hypoxic on RA  Lactate elevated, downtrending  CXR lungs clear  continue supplemental oxygen . Wean as tolerated to keep SpO2 > 92%  Monitor oxygenation.  F/u CTA chest to eval for PE.
2/2 to current acute conditions (E. coli urosepsis) and chronic conditions (as above)    Recommendations:  c/w antibiotic  OOB to chair  Ambulate with a cane  Fall precautions
Hypoxic on RA  Lactate elevated, downtrending  CXR lungs clear  continue supplemental oxygen . Wean as tolerated to keep SpO2 > 92%  Monitor oxygenation.  CTA chest neg PE. Right upper lobe calcified granuloma. Passive and dependent atelectasis in the lower lobes   and lingula. Trace pleural effusions.  OOB /chair , ambulate.  Incentive spirometer.
Hypoxic on RA  Lactate elevated, downtrending  CXR lungs clear  continue supplemental oxygen . Wean as tolerated to keep SpO2 > 92%  Monitor oxygenation.  F/u CTA chest to eval for PE.

## 2023-04-08 NOTE — PROGRESS NOTE ADULT - PROBLEM SELECTOR PLAN 9
Home meds Amlodipine, metoprolol tartrate 50mg BID  continue metoprolol at decreased dose of 25mg BID in the setting of sepsis  Hold Amlodipine in setting of sepsis.
Home meds Amlodipine, metoprolol tartrate 50mg BID  continue metoprolol at decreased dose of 25mg BID in the setting of sepsis  Hold Amlodipine in setting of sepsis
Home meds Amlodipine, metoprolol tartrate 50mg BID  continue metoprolol at decreased dose of 25mg BID in the setting of sepsis  Hold Amlodipine in setting of sepsis - BP is soft, will closely monitor and resume when indicated
Home meds Amlodipine, metoprolol tartrate 50mg BID  continue metoprolol at decreased dose of 25mg BID in the setting of sepsis  Hold Amlodipine in setting of sepsis
Home meds Amlodipine, metoprolol tartrate 50mg BID  continue metoprolol at decreased dose of 25mg BID in the setting of sepsis  Hold Amlodipine in setting of sepsis.
Home meds Amlodipine, metoprolol tartrate 50mg BID  continue metoprolol at decreased dose of 25mg BID in the setting of sepsis  Hold Amlodipine in setting of sepsis - BP is soft, will closely monitor and resume when indicated
Home meds Amlodipine, metoprolol tartrate 50mg BID  continue metoprolol at decreased dose of 25mg BID in the setting of sepsis  Hold Amlodipine in setting of sepsis - BP is soft, will closely monitor and resume when indicated

## 2023-04-09 LAB
ANION GAP SERPL CALC-SCNC: 5 MMOL/L — SIGNIFICANT CHANGE UP (ref 5–17)
BUN SERPL-MCNC: 14 MG/DL — SIGNIFICANT CHANGE UP (ref 7–18)
CALCIUM SERPL-MCNC: 8.8 MG/DL — SIGNIFICANT CHANGE UP (ref 8.4–10.5)
CHLORIDE SERPL-SCNC: 109 MMOL/L — HIGH (ref 96–108)
CO2 SERPL-SCNC: 23 MMOL/L — SIGNIFICANT CHANGE UP (ref 22–31)
CREAT SERPL-MCNC: 0.68 MG/DL — SIGNIFICANT CHANGE UP (ref 0.5–1.3)
CULTURE RESULTS: SIGNIFICANT CHANGE UP
CULTURE RESULTS: SIGNIFICANT CHANGE UP
EGFR: 98 ML/MIN/1.73M2 — SIGNIFICANT CHANGE UP
GLUCOSE BLDC GLUCOMTR-MCNC: 118 MG/DL — HIGH (ref 70–99)
GLUCOSE SERPL-MCNC: 117 MG/DL — HIGH (ref 70–99)
HCT VFR BLD CALC: 32.3 % — LOW (ref 39–50)
HGB BLD-MCNC: 10.3 G/DL — LOW (ref 13–17)
LACTATE SERPL-SCNC: 0.8 MMOL/L — SIGNIFICANT CHANGE UP (ref 0.7–2)
MCHC RBC-ENTMCNC: 19.5 PG — LOW (ref 27–34)
MCHC RBC-ENTMCNC: 31.9 GM/DL — LOW (ref 32–36)
MCV RBC AUTO: 61.1 FL — LOW (ref 80–100)
NRBC # BLD: 0 /100 WBCS — SIGNIFICANT CHANGE UP (ref 0–0)
PHOSPHATE SERPL-MCNC: 2.8 MG/DL — SIGNIFICANT CHANGE UP (ref 2.5–4.5)
PLATELET # BLD AUTO: 359 K/UL — SIGNIFICANT CHANGE UP (ref 150–400)
POTASSIUM SERPL-MCNC: 3.9 MMOL/L — SIGNIFICANT CHANGE UP (ref 3.5–5.3)
POTASSIUM SERPL-SCNC: 3.9 MMOL/L — SIGNIFICANT CHANGE UP (ref 3.5–5.3)
RBC # BLD: 5.29 M/UL — SIGNIFICANT CHANGE UP (ref 4.2–5.8)
RBC # FLD: 16.4 % — HIGH (ref 10.3–14.5)
SARS-COV-2 RNA SPEC QL NAA+PROBE: SIGNIFICANT CHANGE UP
SODIUM SERPL-SCNC: 137 MMOL/L — SIGNIFICANT CHANGE UP (ref 135–145)
SPECIMEN SOURCE: SIGNIFICANT CHANGE UP
SPECIMEN SOURCE: SIGNIFICANT CHANGE UP
WBC # BLD: 7.17 K/UL — SIGNIFICANT CHANGE UP (ref 3.8–10.5)
WBC # FLD AUTO: 7.17 K/UL — SIGNIFICANT CHANGE UP (ref 3.8–10.5)

## 2023-04-09 RX ORDER — CHLORPROMAZINE HCL 10 MG
75 TABLET ORAL AT BEDTIME
Refills: 0 | Status: DISCONTINUED | OUTPATIENT
Start: 2023-04-09 | End: 2023-04-10

## 2023-04-09 RX ORDER — CHLORPROMAZINE HCL 10 MG
25 TABLET ORAL EVERY 8 HOURS
Refills: 0 | Status: DISCONTINUED | OUTPATIENT
Start: 2023-04-09 | End: 2023-04-10

## 2023-04-09 RX ORDER — CHLORPROMAZINE HCL 10 MG
50 TABLET ORAL
Refills: 0 | Status: DISCONTINUED | OUTPATIENT
Start: 2023-04-09 | End: 2023-04-10

## 2023-04-09 RX ADMIN — FAMOTIDINE 20 MILLIGRAM(S): 10 INJECTION INTRAVENOUS at 12:43

## 2023-04-09 RX ADMIN — CLOPIDOGREL BISULFATE 75 MILLIGRAM(S): 75 TABLET, FILM COATED ORAL at 12:43

## 2023-04-09 RX ADMIN — Medication 50 MILLIGRAM(S): at 05:27

## 2023-04-09 RX ADMIN — Medication 325 MILLIGRAM(S): at 12:43

## 2023-04-09 RX ADMIN — Medication 102 MILLIGRAM(S): at 02:31

## 2023-04-09 RX ADMIN — Medication 50 MILLIGRAM(S): at 18:25

## 2023-04-09 RX ADMIN — Medication 50 MILLIGRAM(S): at 14:15

## 2023-04-09 RX ADMIN — Medication 75 MILLIGRAM(S): at 21:15

## 2023-04-09 NOTE — CHART NOTE - NSCHARTNOTEFT_GEN_A_CORE
EVENT: Called to assess patient for talking loudly excessively, refusing scheduled medication    BRIEF HPI: 73 male, uses cane, from [facility] with PMH of , CHF, AICD, GERD, HTN, HDL, Vertigo, who was sent from assisted living to be evaluated for generalized weakness and tachycardia. Admitted for severe sepsis due to UTI. He reports medication is working slowly but not at his baseline, still + dysuria but no hematuria and still able to void, declined blood work or PT eval till Monday when he thinks his UTI will improve.    OBJECTIVE:  Vital Signs Last 24 Hrs  T(C): 37.3 (08 Apr 2023 21:35), Max: 37.3 (08 Apr 2023 21:35)  T(F): 99.2 (08 Apr 2023 21:35), Max: 99.2 (08 Apr 2023 21:35)  HR: 96 (08 Apr 2023 21:35) (86 - 96)  BP: 124/79 (08 Apr 2023 21:35) (124/68 - 144/91)  BP(mean): --  RR: 18 (08 Apr 2023 21:35) (17 - 18)  SpO2: 92% (08 Apr 2023 21:35) (92% - 97%)    Parameters below as of 08 Apr 2023 21:35  Patient On (Oxygen Delivery Method): room air    FOCUSED PHYSICAL EXAM:  NEURO: Talking loudly excessively, oriented X 3  RESP: Inc with agitation  CV: Unable    PROBLEM: Agitation demonstrated by excessive verbal expression probably due to Schizophrenia  PLAN:   1. Chlorpromazine IVPB 25 wilmer GRAM(s) IV Intermittent every 8 hours PRN Agitation first dose now, pt not agreeable to IM  2. Chlorpromazine inc to 50 wilmer GRAM(s) Oral two times a day and 75 wilmer GRAM(s) Oral at bedtime per Dr Boyd note    FOLLOW UP / RESULT: effectiveness of med

## 2023-04-09 NOTE — PROGRESS NOTE ADULT - SUBJECTIVE AND OBJECTIVE BOX
PATIENT SEEN AND EXAMINED ON :- 4/9/23  DATE OF SERVICE:    4/9/23         Interim events noted,Labs ,Radiological studies and Cardiology tests reviewed .    MR#[id number]  PATIENT NAME:Health system COURSE: HPI:  Patient is a 73 male, uses cane, from [facility] with PMHx of Schizophrenia, CHF, AICD, GERD, HTN, HLD, Vertigo, who was sent from assisted living to be evaluated for generalized weakness and tachycardia. He has been having generalized weakness and tachycardia for 2 days. Patient reporta having dysuria and fever for 1 month. He denies any other symptoms. He endorses chronic constipation(last BM yesterday) and chronic lower back pain. Patient denies headache, nausea, vomit, chest pain, shortness of breath, cough, lightheadedness, abdominal pain, diarrhea, dark/bloody stool, hematuria, loss of strength, or loss of sensation. (04 Apr 2023 16:44)      INTERIM EVENTS:Patient seen at bedside ,interim events noted.      PMH -reviewed admission note, no change since admission  HEART FAILURE: Acute[ ]Chronic[ ] Systolic[ ] Diastolic[ ] Combined Systolic and Diastolic[ ]  CAD[ ] CABG[ ] PCI[ ]  DEVICES[ ] PPM[ ] ICD[ ] ILR[ ]  ATRIAL FIBRILLATION[ ] Paroxysmal[ ] Permanent[ ] CHADS2-[  ]  IRMA[ ] CKD1[ ] CKD2[ ] CKD3[ ] CKD4[ ] ESRD[ ]  COPD[ ] HTN[ ]   DM[ ] Type1[ ] Type 2[ ]   CVA[ ] Paresis[ ]    AMBULATION: Assisted[ ] Cane/walker[ ] Independent[ ]    MEDICATIONS  (STANDING):  ammonium lactate 12% Lotion 1 Application(s) Topical <User Schedule>  cefTRIAXone   IVPB 1000 milliGRAM(s) IV Intermittent every 24 hours  chlorproMAZINE    Tablet 50 milliGRAM(s) Oral <User Schedule>  chlorproMAZINE    Tablet 75 milliGRAM(s) Oral at bedtime  clopidogrel Tablet 75 milliGRAM(s) Oral daily  enoxaparin Injectable 40 milliGRAM(s) SubCutaneous every 24 hours  famotidine    Tablet 20 milliGRAM(s) Oral daily  ferrous    sulfate 325 milliGRAM(s) Oral daily  latanoprost 0.005% Ophthalmic Solution 1 Drop(s) Both EYES at bedtime  metoprolol tartrate 50 milliGRAM(s) Oral two times a day  polyethylene glycol 3350 17 Gram(s) Oral two times a day  senna 2 Tablet(s) Oral at bedtime  simvastatin 10 milliGRAM(s) Oral at bedtime    MEDICATIONS  (PRN):  acetaminophen     Tablet .. 650 milliGRAM(s) Oral every 6 hours PRN Temp greater or equal to 38C (100.4F), Mild Pain (1 - 3)  bisacodyl Suppository 10 milliGRAM(s) Rectal daily PRN Constipation  chlorproMAZINE    IVPB 25 milliGRAM(s) IV Intermittent every 8 hours PRN Agitation  melatonin 3 milliGRAM(s) Oral at bedtime PRN Insomnia  ondansetron Injectable 4 milliGRAM(s) IV Push every 8 hours PRN Nausea and/or Vomiting            REVIEW OF SYSTEMS:  Constitutional: [ ] fever, [ ]weight loss,  [ ]fatigue [ ]weight gain  Eyes: [ ] visual changes  Respiratory: [ ]shortness of breath;  [ ] cough, [ ]wheezing, [ ]chills, [ ]hemoptysis  Cardiovascular: [ ] chest pain, [ ]palpitations, [ ]dizziness,  [ ]leg swelling[ ]orthopnea[ ]PND  Gastrointestinal: [ ] abdominal pain, [ ]nausea, [ ]vomiting,  [ ]diarrhea [ ]Constipation [ ]Melena  Genitourinary: [ ] dysuria, [ ] hematuria [ ]Mcnamara  Neurologic: [ ] headaches [ ] tremors[ ]weakness [ ]Paralysis Right[ ] Left[ ]  Skin: [ ] itching, [ ]burning, [ ] rashes  Endocrine: [ ] heat or cold intolerance  Musculoskeletal: [ ] joint pain or swelling; [ ] muscle, back, or extremity pain  Psychiatric: [ ] depression, [ ]anxiety, [ ]mood swings, or [ ]difficulty sleeping  Hematologic: [ ] easy bruising, [ ] bleeding gums    [ ] All remaining systems negative except as per above.   [ ]Unable to obtain.  [x] No change in ROS since admission      Vital Signs Last 24 Hrs  T(C): 36.7 (09 Apr 2023 05:16), Max: 37.3 (08 Apr 2023 21:35)  T(F): 98.1 (09 Apr 2023 05:16), Max: 99.2 (08 Apr 2023 21:35)  HR: 90 (09 Apr 2023 05:16) (86 - 96)  BP: 138/89 (09 Apr 2023 05:16) (124/79 - 144/91)  BP(mean): --  RR: 18 (09 Apr 2023 05:16) (18 - 18)  SpO2: 95% (09 Apr 2023 05:16) (92% - 97%)    Parameters below as of 09 Apr 2023 05:16  Patient On (Oxygen Delivery Method): room air      I&O's Summary      PHYSICAL EXAM:  General: No acute distress BMI-  HEENT: EOMI, PERRL  Neck: Supple, [ ] JVD  Lungs: Equal air entry bilaterally; [ ] rales [ ] wheezing [ ] rhonchi  Heart: Regular rate and rhythm; [x ] murmur   2/6 [ x] systolic [ ] diastolic [ ] radiation[ ] rubs [ ]  gallops  Abdomen: Nontender, bowel sounds present  Extremities: No clubbing, cyanosis, [ ] edema [ ]Pulses  equal and intact  Nervous system:  Alert & Oriented X3, no focal deficits  Psychiatric: Normal affect  Skin: No rashes or lesions    LABS:        Creatinine Trend: 1.20<--, 1.27<--, 1.53<--

## 2023-04-09 NOTE — PROGRESS NOTE ADULT - SUBJECTIVE AND OBJECTIVE BOX
PULMONARY  progress note    CARTER MOHAN  MRN-[id number]    Patient is a 73y old  Male who presents with a chief complaint of Severe sepsis due to UTI (09 Apr 2023 12:58)  feels better, no cough sob or chills      MEDICATIONS  (STANDING):  ammonium lactate 12% Lotion 1 Application(s) Topical <User Schedule>  cefTRIAXone   IVPB 1000 milliGRAM(s) IV Intermittent every 24 hours  chlorproMAZINE    Tablet 50 milliGRAM(s) Oral <User Schedule>  chlorproMAZINE    Tablet 75 milliGRAM(s) Oral at bedtime  clopidogrel Tablet 75 milliGRAM(s) Oral daily  enoxaparin Injectable 40 milliGRAM(s) SubCutaneous every 24 hours  famotidine    Tablet 20 milliGRAM(s) Oral daily  ferrous    sulfate 325 milliGRAM(s) Oral daily  latanoprost 0.005% Ophthalmic Solution 1 Drop(s) Both EYES at bedtime  metoprolol tartrate 50 milliGRAM(s) Oral two times a day  polyethylene glycol 3350 17 Gram(s) Oral two times a day  senna 2 Tablet(s) Oral at bedtime  simvastatin 10 milliGRAM(s) Oral at bedtime        PAST MEDICAL & SURGICAL HISTORY:  HTN      Schizophrenia      CHF (congestive heart failure)      HLD (hyperlipidemia)      Vertigo      GERD (gastroesophageal reflux disease)      Hydrocele  Hydrocele removal 2007      S/P PTCA (Percutaneous Transluminal Coronary Angioplasty)      AICD (automatic cardioverter/defibrillator) present               REVIEW OF SYSTEMS:  CONSTITUTIONAL: No fever, weight loss, or fatigue   EYES: No eye pain, visual disturbances, or discharge  ENT:  No difficulty hearing, tinnitus, vertigo; No sinus or throat pain  NECK: No pain or stiffness or nodes  RESPIRATORY:  cough--   wheezing--   chills--   hemoptysis--  Shortness of Breath--  CARDIOVASCULAR: No chest pain, palpitations, passing out, dizziness, or leg swelling  GASTROINTESTINAL: No abdominal or epigastric pain. No nausea, vomiting,  GENITOURINARY: No dysuria, frequency, hematuria, or incontinence  NEUROLOGICAL: No headaches, memory loss, loss of strength, numbness, or tremors  SKIN: No itching, burning, rashes, or lesions   LYMPH Nodes: No enlarged glands  ENDOCRINE: No heat or cold intolerance; No hair loss  ALLERGY AND IMMUNOLOGIC: No hives or eczema        Vital Signs Last 24 Hrs  T(C): 36.4 (09 Apr 2023 14:28), Max: 37.3 (08 Apr 2023 21:35)  T(F): 97.5 (09 Apr 2023 14:28), Max: 99.2 (08 Apr 2023 21:35)  HR: 94 (09 Apr 2023 18:27) (72 - 96)  BP: 117/84 (09 Apr 2023 18:27) (117/84 - 138/89)  BP(mean): --  RR: 18 (09 Apr 2023 14:28) (18 - 18)  SpO2: 98% (09 Apr 2023 14:28) (92% - 98%)  w/m  Parameters below as of 09 Apr 2023 14:28  Patient On (Oxygen Delivery Method): room air            PHYSICAL EXAMINATION:    GENERAL: The patient is a well-developed, obese  in no apparent distress.   SKIN no rash ecchymoses or bruises  HEENT: Head is normocephalic and atraumatic  ANTHONY , Extraocular muscles are intact. Mucous membranes  moist.   Neck supple ,No LN felt JVP not increased  Thyroid not enlarged  Cardiovascular:  S1 S2 heard, RSR, No JVD , systolic  murmur at apex, No gallop or rub, AICD lt side  Respiratory: Chest wall symmetrical with good air entry ,Percussion note normal,    Lungs vesicular breathing with no rales or  wheeze	  ABDOMEN:  Soft, Non-tender, obese  no hepatomegaly or splenomegaly BS positive	  Extremities: Normal range of motion, No clubbing, cyanosis or edema  Vascular: Peripheral pulses palpable 2+ bilaterally  CNS:  Alert and oriented x3  Cranial nerves intact  sensory intact  motor power5/5  dtr 2+  Babinski neg        LABS:                        10.3   7.17  )-----------( 359      ( 09 Apr 2023 16:50 )             32.3     04-09    137  |  109<H>  |  14  ----------------------------<  117<H>  3.9   |  23  |  0.68    Ca    8.8      09 Apr 2023 16:50  Phos  2.8     04-09    Lactate, Blood: 0.8 mmol/L (04-09-23 @ 16:42)      MICROBIOLOGY:    Culture - Urine (collected 04-04-23 @ 14:35)  Source: Clean Catch Clean Catch (Midstream)  Final Report (04-06-23 @ 22:42):    >100,000 CFU/ml Escherichia coli  Organism: Escherichia coli (04-06-23 @ 22:42)  Organism: Escherichia coli (04-06-23 @ 22:42)      Method Type: ANTOINETTE      -  Amikacin: S <=16      -  Amoxicillin/Clavulanic Acid: S <=8/4      -  Ampicillin: S <=8 These ampicillin results predict results for amoxicillin      -  Ampicillin/Sulbactam: S <=4/2 Enterobacter, Klebsiella aerogenes, Citrobacter, and Serratia may develop resistance during prolonged therapy (3-4 days)      -  Aztreonam: S <=4      -  Cefazolin: S <=2 For uncomplicated UTI with K. pneumoniae, E. coli, or P. mirablis: ANTOINETTE <=16 is sensitive and ANTOINETTE >=32 is resistant. This also predicts results for oral agents cefaclor, cefdinir, cefpodoxime, cefprozil, cefuroxime axetil, cephalexin and locarbef for uncomplicated UTI. Note that some isolates may be susceptible to these agents while testing resistant to cefazolin.      -  Cefepime: S <=2      -  Cefoxitin: S <=8      -  Ceftriaxone: S <=1 Enterobacter, Klebsiella aerogenes, Citrobacter, and Serratia may develop resistance during prolonged therapy      -  Cefuroxime: S <=4      -  Ciprofloxacin: S <=0.25      -  Ertapenem: S <=0.5      -  Gentamicin: S <=2      -  Imipenem: S <=1      -  Levofloxacin: S <=0.5      -  Meropenem: S <=1      -  Nitrofurantoin: S <=32 Should not be used to treat pyelonephritis      -  Piperacillin/Tazobactam: S <=8      -  Tobramycin: S <=2      -  Trimethoprim/Sulfamethoxazole: S <=0.5/9.5    Culture - Blood (collected 04-04-23 @ 13:10)  Source: .Blood Blood-Peripheral  Final Report (04-09-23 @ 18:01):    No Growth Final    Culture - Blood (collected 04-04-23 @ 13:00)  Source: .Blood Blood-Peripheral  Final Report (04-09-23 @ 18:00):    No Growth Final

## 2023-04-10 ENCOUNTER — TRANSCRIPTION ENCOUNTER (OUTPATIENT)
Age: 73
End: 2023-04-10

## 2023-04-10 VITALS
OXYGEN SATURATION: 96 % | HEART RATE: 79 BPM | TEMPERATURE: 98 F | DIASTOLIC BLOOD PRESSURE: 84 MMHG | RESPIRATION RATE: 18 BRPM | SYSTOLIC BLOOD PRESSURE: 126 MMHG

## 2023-04-10 LAB
ANION GAP SERPL CALC-SCNC: 5 MMOL/L — SIGNIFICANT CHANGE UP (ref 5–17)
BUN SERPL-MCNC: 15 MG/DL — SIGNIFICANT CHANGE UP (ref 7–18)
CALCIUM SERPL-MCNC: 9.2 MG/DL — SIGNIFICANT CHANGE UP (ref 8.4–10.5)
CHLORIDE SERPL-SCNC: 108 MMOL/L — SIGNIFICANT CHANGE UP (ref 96–108)
CO2 SERPL-SCNC: 25 MMOL/L — SIGNIFICANT CHANGE UP (ref 22–31)
CREAT SERPL-MCNC: 0.83 MG/DL — SIGNIFICANT CHANGE UP (ref 0.5–1.3)
EGFR: 92 ML/MIN/1.73M2 — SIGNIFICANT CHANGE UP
GLUCOSE SERPL-MCNC: 108 MG/DL — HIGH (ref 70–99)
HCT VFR BLD CALC: 32.6 % — LOW (ref 39–50)
HGB BLD-MCNC: 10.3 G/DL — LOW (ref 13–17)
MCHC RBC-ENTMCNC: 19.4 PG — LOW (ref 27–34)
MCHC RBC-ENTMCNC: 31.6 GM/DL — LOW (ref 32–36)
MCV RBC AUTO: 61.3 FL — LOW (ref 80–100)
NRBC # BLD: 0 /100 WBCS — SIGNIFICANT CHANGE UP (ref 0–0)
PLATELET # BLD AUTO: 384 K/UL — SIGNIFICANT CHANGE UP (ref 150–400)
POTASSIUM SERPL-MCNC: 3.6 MMOL/L — SIGNIFICANT CHANGE UP (ref 3.5–5.3)
POTASSIUM SERPL-SCNC: 3.6 MMOL/L — SIGNIFICANT CHANGE UP (ref 3.5–5.3)
RBC # BLD: 5.32 M/UL — SIGNIFICANT CHANGE UP (ref 4.2–5.8)
RBC # FLD: 16 % — HIGH (ref 10.3–14.5)
SODIUM SERPL-SCNC: 138 MMOL/L — SIGNIFICANT CHANGE UP (ref 135–145)
WBC # BLD: 7.58 K/UL — SIGNIFICANT CHANGE UP (ref 3.8–10.5)
WBC # FLD AUTO: 7.58 K/UL — SIGNIFICANT CHANGE UP (ref 3.8–10.5)

## 2023-04-10 RX ORDER — CEFUROXIME AXETIL 250 MG
1 TABLET ORAL
Qty: 2 | Refills: 0
Start: 2023-04-10 | End: 2023-04-10

## 2023-04-10 RX ORDER — CHLORPROMAZINE HCL 10 MG
1 TABLET ORAL
Refills: 0 | DISCHARGE

## 2023-04-10 RX ORDER — AMLODIPINE BESYLATE 2.5 MG/1
1 TABLET ORAL
Refills: 0 | DISCHARGE

## 2023-04-10 RX ORDER — ACETAMINOPHEN 500 MG
2 TABLET ORAL
Qty: 0 | Refills: 0 | DISCHARGE
Start: 2023-04-10

## 2023-04-10 RX ORDER — METOPROLOL TARTRATE 50 MG
1 TABLET ORAL
Refills: 0 | DISCHARGE

## 2023-04-10 RX ORDER — CHLORPROMAZINE HCL 10 MG
1 TABLET ORAL
Qty: 0 | Refills: 0 | DISCHARGE
Start: 2023-04-10

## 2023-04-10 RX ORDER — METOPROLOL TARTRATE 50 MG
1 TABLET ORAL
Qty: 0 | Refills: 0 | DISCHARGE
Start: 2023-04-10

## 2023-04-10 RX ORDER — CHLORPROMAZINE HCL 10 MG
3 TABLET ORAL
Qty: 0 | Refills: 0 | DISCHARGE
Start: 2023-04-10

## 2023-04-10 RX ADMIN — Medication 325 MILLIGRAM(S): at 11:09

## 2023-04-10 RX ADMIN — Medication 50 MILLIGRAM(S): at 14:57

## 2023-04-10 RX ADMIN — Medication 50 MILLIGRAM(S): at 05:11

## 2023-04-10 NOTE — CHART NOTE - NSCHARTNOTEFT_GEN_A_CORE
Spoke with Primary Care Attending, Fidelia Degroot MD. Agreed that Palliative Care team will sign off at this time. Please re-consult as needed. Palliative Care will sign off. Please reconsult as needed.

## 2023-04-10 NOTE — PROGRESS NOTE ADULT - SUBJECTIVE AND OBJECTIVE BOX
PULMONARY  progress note    CARTER MOHAN  MRN-[id number]    Patient is a 73y old  Male who presents with a chief complaint of Severe sepsis due to UTI (09 Apr 2023 18:46)  Feels better, no cough sob or dysuria  Blood c/s are negative      MEDICATIONS  (STANDING):  ammonium lactate 12% Lotion 1 Application(s) Topical <User Schedule>  cefTRIAXone   IVPB 1000 milliGRAM(s) IV Intermittent every 24 hours  chlorproMAZINE    Tablet 50 milliGRAM(s) Oral <User Schedule>  chlorproMAZINE    Tablet 75 milliGRAM(s) Oral at bedtime  clopidogrel Tablet 75 milliGRAM(s) Oral daily  enoxaparin Injectable 40 milliGRAM(s) SubCutaneous every 24 hours  famotidine    Tablet 20 milliGRAM(s) Oral daily  ferrous    sulfate 325 milliGRAM(s) Oral daily  latanoprost 0.005% Ophthalmic Solution 1 Drop(s) Both EYES at bedtime  metoprolol tartrate 50 milliGRAM(s) Oral two times a day  polyethylene glycol 3350 17 Gram(s) Oral two times a day  senna 2 Tablet(s) Oral at bedtime  simvastatin 10 milliGRAM(s) Oral at bedtime        PAST MEDICAL & SURGICAL HISTORY:  HTN      Schizophrenia      CHF (congestive heart failure)      HLD (hyperlipidemia)      Vertigo      GERD (gastroesophageal reflux disease)      Hydrocele  Hydrocele removal 2007      S/P PTCA (Percutaneous Transluminal Coronary Angioplasty)      AICD (automatic cardioverter/defibrillator) present               REVIEW OF SYSTEMS:  CONSTITUTIONAL: No fever, weight loss, or fatigue   EYES: No eye pain, visual disturbances, or discharge  ENT:  No difficulty hearing, tinnitus, vertigo; No sinus or throat pain  NECK: No pain or stiffness or nodes  RESPIRATORY:  cough --  wheezing--   chills --  hemoptysis--  Shortness of Breath--  CARDIOVASCULAR: No chest pain, palpitations, passing out, dizziness, or leg swelling  GASTROINTESTINAL: No abdominal or epigastric pain. No nausea, vomiting,   GENITOURINARY: No dysuria, frequency, hematuria, or incontinence  NEUROLOGICAL: No headaches, memory loss, loss of strength, numbness, or tremors  SKIN: No itching, burning, rashes, or lesions   LYMPH Nodes: No enlarged glands  ENDOCRINE: No heat or cold intolerance; No hair loss  MUSCULOSKELETAL: No joint pain or swelling; No muscle, back, or extremity pain  PSYCHIATRIC: No depression, anxiety but  mood swings +  HEME/LYMPH: No easy bruising, or bleeding gums  ALLERGY AND IMMUNOLOGIC: No hives or eczema        Vital Signs Last 24 Hrs  T(C): 36.3 (10 Apr 2023 05:16), Max: 36.6 (09 Apr 2023 21:31)  T(F): 97.3 (10 Apr 2023 05:16), Max: 97.9 (09 Apr 2023 21:31)  HR: 80 (10 Apr 2023 10:10) (70 - 94)  BP: 138/86 (10 Apr 2023 10:10) (117/84 - 145/89)  BP(mean): --  RR: 18 (10 Apr 2023 05:16) (18 - 18)  SpO2: 96% (10 Apr 2023 10:10) (96% - 98%)    Parameters below as of 10 Apr 2023 10:10  Patient On (Oxygen Delivery Method): room air      I&O's Detail      PHYSICAL EXAMINATION:    GENERAL: The patient is a well-developed, obese w/min no apparent distress.   SKIN no rash ecchymoses or bruises  HEENT: Head is normocephalic and atraumatic  ANTHONY , Extraocular muscles are intact. Mucous membranes  moist.   Neck supple ,No LN felt JVP not increased  Thyroid not enlarged  Cardiovascular:  S1 S2 heard, RSR, No JVD , systolic  murmur at apex, No gallop or rub, AICD lt side  Respiratory: Chest wall symmetrical with good air entry ,Percussion note normal,    Lungs vesicular breathing with no   rales  or  wheeze	  ABDOMEN:  Soft, Non-tender, obese,   no hepatomegaly or splenomegaly BS positive	  Extremities: Normal range of motion, No clubbing, cyanosis or edema  Vascular: Peripheral pulses palpable 2+ bilaterally  CNS:  Alert and oriented   Cranial nerves intact  sensory intact  motor power5/5  dtr 2+  Babinski neg        LABS:                        10.3   7.58  )-----------( 384      ( 10 Apr 2023 06:29 )             32.6     04-10    138  |  108  |  15  ----------------------------<  108<H>  3.6   |  25  |  0.83    Ca    9.2      10 Apr 2023 06:29  Phos  2.8     04-09        Lactate, Blood: 0.8 mmol/L (04-09-23 @ 16:42)            MICROBIOLOGY:    Culture - Urine (collected 04-04-23 @ 14:35)  Source: Clean Catch Clean Catch (Midstream)  Final Report (04-06-23 @ 22:42):    >100,000 CFU/ml Escherichia coli  Organism: Escherichia coli (04-06-23 @ 22:42)  Organism: Escherichia coli (04-06-23 @ 22:42)      Method Type: ANTOINETTE      -  Amikacin: S <=16      -  Amoxicillin/Clavulanic Acid: S <=8/4      -  Ampicillin: S <=8 These ampicillin results predict results for amoxicillin      -  Ampicillin/Sulbactam: S <=4/2 Enterobacter, Klebsiella aerogenes, Citrobacter, and Serratia may develop resistance during prolonged therapy (3-4 days)      -  Aztreonam: S <=4      -  Cefazolin: S <=2 For uncomplicated UTI with K. pneumoniae, E. coli, or P. mirablis: ANTOINETTE <=16 is sensitive and ANTOINETTE >=32 is resistant. This also predicts results for oral agents cefaclor, cefdinir, cefpodoxime, cefprozil, cefuroxime axetil, cephalexin and locarbef for uncomplicated UTI. Note that some isolates may be susceptible to these agents while testing resistant to cefazolin.      -  Cefepime: S <=2      -  Cefoxitin: S <=8      -  Ceftriaxone: S <=1 Enterobacter, Klebsiella aerogenes, Citrobacter, and Serratia may develop resistance during prolonged therapy      -  Cefuroxime: S <=4      -  Ciprofloxacin: S <=0.25      -  Ertapenem: S <=0.5      -  Gentamicin: S <=2      -  Imipenem: S <=1      -  Levofloxacin: S <=0.5      -  Meropenem: S <=1      -  Nitrofurantoin: S <=32 Should not be used to treat pyelonephritis      -  Piperacillin/Tazobactam: S <=8      -  Tobramycin: S <=2      -  Trimethoprim/Sulfamethoxazole: S <=0.5/9.5    Culture - Blood (collected 04-04-23 @ 13:10)  Source: .Blood Blood-Peripheral  Final Report (04-09-23 @ 18:01):    No Growth Final    Culture - Blood (collected 04-04-23 @ 13:00)  Source: .Blood Blood-Peripheral  Final Report (04-09-23 @ 18:00):    No Growth Final

## 2023-04-10 NOTE — PHYSICAL THERAPY INITIAL EVALUATION ADULT - GAIT DEVIATIONS NOTED, PT EVAL
pt reports he is more steady when he walks slower and more carefully/decreased juan luis/decreased step length/decreased stride length

## 2023-04-10 NOTE — DISCHARGE NOTE PROVIDER - HOSPITAL COURSE
73 male, uses cane, from [facility] with PMHx of Schizophrenia, CHF, AICD, GERD, HTN, HLD, Vertigo, who was sent from assisted living to be evaluated for generalized weakness and tachycardia. Admitted for severe sepsis due to UTI, hospital course complicated by patient refusing blood work and PT   UCx with pansensitive EColi, started on ceftriaxone and ID Lidia followed. PT eval found NO PT needs and can return back to [facility]   Given clinical course decision made to discharge patient home with outpatient follow up   Discharge discussed with attending   This is only a brief summary of patient's hospital stay, for full course please see EMR

## 2023-04-10 NOTE — DISCHARGE NOTE PROVIDER - NSDCCPCAREPLAN_GEN_ALL_CORE_FT
PRINCIPAL DISCHARGE DIAGNOSIS  Diagnosis: Severe sepsis with lactic acidosis  Assessment and Plan of Treatment: You presented to the hospital with a severe infection also called sepsis. This was found to be secondary to your acute urinary tract infection. You were followed by the infectious disease specialist and treated with intravenous antibiotics and IV fluids this has now resolved      SECONDARY DISCHARGE DIAGNOSES  Diagnosis: Acute UTI  Assessment and Plan of Treatment: You were found to have a urinary tract infection, this was treated with intreavenous anitbiotics and reccomended to continue 1 more day of Ceftin 250mg twice a day to complete 7 day course. Finish the medication even if you feel better after you have only taken some of the medication.Drink enough water and fluids to keep your urine clear or pale yellow.Avoid caffeine, tea, and carbonated beverages. They tend to irritate your bladder. Empty your bladder often. Avoid holding urine for long periods of time. SEEK MEDICAL CARE IF:  You have back pain. You develop a fever. Your symptoms do not begin to resolve within 3 days. SEEK IMMEDIATE MEDICAL CARE IF:  You have severe back pain or lower abdominal pain. You develop chills.  You have nausea or vomiting. You have continued burning or discomfort with urination.    Diagnosis: IRMA (acute kidney injury)  Assessment and Plan of Treatment: You have been diagnosed with acute kidney injury. This means that your kidneys are not working properly. When both kidneys are healthy, they help filter out fluid and waste from the blood and body. Acute kidney injury has many causes. These include urinary blockages, infection, lack of enough blood supply, and medicines that can injure kidneys. In some cases, acute kidney injury is short-term (temporary), lasting several days to a few months. This is because the kidney can repair itself. But acute kidney injury can also result in chronic kidney disease or end stage renal failure.   It is important that you stay hydrated, and keep a record of how much you urinate. Avoid kidney toxic medications like NSAIDs (Motrin ibuprofen and IV contrast).      Diagnosis: Schizophrenia  Assessment and Plan of Treatment: You have a history of Schizophrenia you were followed by the phsyciatrist and your Thorazine dose was changed to 50mg twice a day and 75mg at bedtime. It is important for you to follow up with your outpatient psychiatrist in 1 week for continued mangement of this condition    Diagnosis: HTN (hypertension)  Assessment and Plan of Treatment: You have a history of high blood pressure, it is important to continue a Low salt diet Activity as tolerated. Take all medication as prescribed.  Follow up with your medical doctor for routine blood pressure monitoring at your next visit. Notify your doctor if you have any of the following symptoms: dizziness, Lightheadedness, Blurry vision, Headache, Chest pain, Shortness of breath

## 2023-04-10 NOTE — PROGRESS NOTE ADULT - ASSESSMENT
IMP ;RUL Calcified Granuloma sec to old healed granulomatous disease  Hcvd with MR with CHF with AICD  E.Coli urosepsis  Schizophrenia  Obesity      PLAN; Observation only for RUL granuloma  IV Rocephin/ po ceftin x 7 days   ECHO  OOB in chair / BRP
Patient is a 73 male, uses cane, from [facility] with PMHx of Schizophrenia, CHF, AICD, GERD, HTN, HLD, Vertigo, who was sent from assisted living to be evaluated for generalized weakness and tachycardia. Admitted for severe sepsis due to UTI. He reports medication is working slowly but not at his baseline, still + dysuria but no hematuria and still able to void, declined blood work or PT eval till Monday when he thinks his UTI will improve       
Patient is a 73 male, uses cane, from [facility] with PMHx of Schizophrenia, CHF, AICD, GERD, HTN, HLD, Vertigo, who was sent from assisted living to be evaluated for generalized weakness and tachycardia. Admitted for severe sepsis due to UTI  
IMP ;RUL Calcified Granuloma sec to old healed granulomatous disease  Hcvd with MR with CHF with AICD  E.Coli urosepsis  Schizophrenia  Obesity      PLAN; Observation only for RUL granuloma  IV Rocephin/ po ceftin x 7 days   ECHO  OOB in chair / BRP
IMP ;RUL Calcified Granuloma sec to old healed granulomatous disease  Hcvd with MR with CHF with AICD  E.Coli urosepsis  Schizophrenia  Obesity      PLAN; Observation only for RUL granuloma  ceftin 500 mg bid  x 2 days  ECHO  OOB in chair / BRP
IMP ;RUL Calcified Granuloma sec to old healed granulomatous disease  Hcvd with MR with CHF with AICD  E.Coli urosepsis  Schizophrenia  Obesity      PLAN; Observation only for RUL granuloma  ceftin 500 mg bid  x 3 days  ECHO  OOB in chair / BRP
Patient is a 73 male, uses cane, from [facility] with PMHx of Schizophrenia, CHF, AICD, GERD, HTN, HLD, Vertigo, who was sent from assisted living to be evaluated for generalized weakness and tachycardia. Admitted for severe sepsis due to UTI      
Patient is a 73 male, uses cane, from [facility] with PMHx of Schizophrenia, CHF, AICD, GERD, HTN, HLD, Vertigo, who was sent from assisted living to be evaluated for generalized weakness and tachycardia. Admitted for severe sepsis due to UTI      
Patient is a 73 male, uses cane, from [facility] with PMHx of Schizophrenia, CHF, AICD, GERD, HTN, HLD, Vertigo, who was sent from assisted living to be evaluated for generalized weakness and tachycardia. Admitted for severe sepsis due to UTI. He reports medication is working slowly but not at his baseline, still + dysuria but no hematuria and still able to void, declined blood work or PT eval till Monday when he thinks his UTI will improve       
72 y/o male with PMHx of Schizophrenia, CHF, AICD, GERD, HTN, HLD, Vertigo. CC: sent from [facility] to be evaluated for generalized weakness and tachycardia for 2 days. Patient reported having dysuria and fever for 1 month, chronic constipation (last BM yesterday), and chronic lower back pain. Patient admitted for severe sepsis due to E. coli UTI.
Patient is a 73 male, uses cane, from [facility] with PMHx of Schizophrenia, CHF, AICD, GERD, HTN, HLD, Vertigo, who was sent from assisted living to be evaluated for generalized weakness and tachycardia. Admitted for severe sepsis due to UTI  
Patient is a 73 male, uses cane, from [facility] with PMHx of Schizophrenia, CHF, AICD, GERD, HTN, HLD, Vertigo, who was sent from assisted living to be evaluated for generalized weakness and tachycardia. Admitted for severe sepsis due to UTI. He reports medication is working slowly but not at his baseline, still + dysuria but no hematuria and still able to void, declined blood work or PT eval till Monday when he thinks his UTI will improve

## 2023-04-10 NOTE — DISCHARGE NOTE NURSING/CASE MANAGEMENT/SOCIAL WORK - PATIENT PORTAL LINK FT
You can access the FollowMyHealth Patient Portal offered by [facility] by registering at the following website: [email or web address]. By joining [facility]’s FollowMyHealth portal, you will also be able to view your health information using other applications (apps) compatible with our system.

## 2023-04-10 NOTE — PHYSICAL THERAPY INITIAL EVALUATION ADULT - CRITERIA FOR SKILLED THERAPEUTIC INTERVENTIONS
(ICF Model) Pt. present w/deficits in Body Structures/Function (Impairments) of chronic vision impairment, which mildly impairs balance. However, pt. presents at baseline level of functional ability.

## 2023-04-10 NOTE — PROGRESS NOTE ADULT - TIME BILLING
I have examined pt personally Hx chart lab and xrays reviewed and pt discussed with staff and PMD
I have examined pt personally Hx chart lab and xrays reviewed and pt discussed with staff and PMD
- Review of records, telemetry, vital signs and daily labs.   - General and cardiovascular physical examination.  - Generation of cardiovascular treatment plan.  - Coordination of care.      Patient was seen and examined by me on 4/9/23,interim events noted,labs and radiology studies reviewed.  Zane Mistry MD,FACC.  [address] [place], [place] [id number].  [phone]
I have examined pt personally Hx chart lab and xrays reviewed and pt discussed with staff and PMD
I have examined pt personally Hx chart lab and xrays reviewed and pt discussed with staff and PMD
- Review of records, telemetry, vital signs and daily labs.   - General and cardiovascular physical examination.  - Generation of cardiovascular treatment plan.  - Coordination of care.      Patient was seen and examined by me on 4/8/23,interim events noted,labs and radiology studies reviewed.  Zane Mistry MD,FACC.  [address] [place], [place] [id number].  [phone]
- Review of records, telemetry, vital signs and daily labs.   - General and cardiovascular physical examination.  - Generation of cardiovascular treatment plan.  - Coordination of care.      Patient was seen and examined by me on 4/7/23,interim events noted,labs and radiology studies reviewed.  Zane Mistry MD,FACC.  [address] [place], [place] [id number].  [phone]
- Review of records, telemetry, vital signs and daily labs.   - General and cardiovascular physical examination.  - Generation of cardiovascular treatment plan.  - Coordination of care.      Patient was seen and examined by me on 4/5/23,interim events noted,labs and radiology studies reviewed.  Zane Mistry MD,FACC.  [address] [place], [place] [id number].  [phone]
- Review of records, telemetry, vital signs and daily labs.   - General and cardiovascular physical examination.  - Generation of cardiovascular treatment plan.  - Coordination of care.      Patient was seen and examined by me on 4/6/23,interim events noted,labs and radiology studies reviewed.  Zane Mistry MD,FACC.  [address] [place], [place] [id number].  [phone]

## 2023-04-10 NOTE — PROGRESS NOTE ADULT - PROVIDER SPECIALTY LIST ADULT
Internal Medicine
Palliative Care
Internal Medicine
Palliative Care
Pulmonology
Pulmonology
Cardiology
Pulmonology
Pulmonology
Cardiology
Internal Medicine

## 2023-04-10 NOTE — DISCHARGE NOTE PROVIDER - CARE PROVIDER_API CALL
Zane Mistry)  Cardiology  [address] [place] [address]  Phone: [phone]  Fax: [phone]  Follow Up Time: 1 week   04-Apr-2021 20:36

## 2023-04-10 NOTE — CHART NOTE - NSCHARTNOTESELECT_GEN_ALL_CORE
Event Note
Palliative Care/Event Note
Pt refusing CTA chest/Event Note
Family/Event Note
Repeat labs/Event Note

## 2023-04-10 NOTE — DISCHARGE NOTE PROVIDER - NSDCMRMEDTOKEN_GEN_ALL_CORE_FT
acetaminophen 325 mg oral tablet: 2 tab(s) orally every 6 hours As needed Temp greater or equal to 38C (100.4F), Mild Pain (1 - 3)  cefuroxime 250 mg oral tablet: 1 tab(s) orally 2 times a day  chlorproMAZINE 25 mg oral tablet: 3 tab(s) orally once a day (at bedtime)  chlorproMAZINE 50 mg oral tablet: 1 tab(s) orally 2 times a day  cholecalciferol 1250 mcg (50,000 intl units) oral capsule: 1 cap(s) orally once a week  clopidogrel 75 mg oral tablet: 1 tab(s) orally once a day  ferrous sulfate 325 mg (65 mg elemental iron) oral tablet: 1 tab(s) orally once a day  Lac-Hydrin 12% topical lotion: Apply topically to affected area once a day on bl feet and legs  metoprolol tartrate 50 mg oral tablet: 1 tab(s) orally 2 times a day  Pepcid 20 mg oral tablet: 1 tab(s) orally once a day  simvastatin 10 mg oral tablet: 1 tab(s) orally once a day (at bedtime)  Xalatan 0.005% ophthalmic solution: 1 gram(s) in each eye once a day (at bedtime)

## 2023-04-10 NOTE — PROGRESS NOTE ADULT - REASON FOR ADMISSION
Severe sepsis due to UTI

## 2023-04-20 PROCEDURE — 81001 URINALYSIS AUTO W/SCOPE: CPT

## 2023-04-20 PROCEDURE — 83036 HEMOGLOBIN GLYCOSYLATED A1C: CPT

## 2023-04-20 PROCEDURE — 80053 COMPREHEN METABOLIC PANEL: CPT

## 2023-04-20 PROCEDURE — 99285 EMERGENCY DEPT VISIT HI MDM: CPT

## 2023-04-20 PROCEDURE — 71275 CT ANGIOGRAPHY CHEST: CPT

## 2023-04-20 PROCEDURE — 87086 URINE CULTURE/COLONY COUNT: CPT

## 2023-04-20 PROCEDURE — 71045 X-RAY EXAM CHEST 1 VIEW: CPT

## 2023-04-20 PROCEDURE — 84100 ASSAY OF PHOSPHORUS: CPT

## 2023-04-20 PROCEDURE — 87040 BLOOD CULTURE FOR BACTERIA: CPT

## 2023-04-20 PROCEDURE — 93005 ELECTROCARDIOGRAM TRACING: CPT

## 2023-04-20 PROCEDURE — 85610 PROTHROMBIN TIME: CPT

## 2023-04-20 PROCEDURE — 86803 HEPATITIS C AB TEST: CPT

## 2023-04-20 PROCEDURE — 97161 PT EVAL LOW COMPLEX 20 MIN: CPT

## 2023-04-20 PROCEDURE — 80048 BASIC METABOLIC PNL TOTAL CA: CPT

## 2023-04-20 PROCEDURE — 85025 COMPLETE CBC W/AUTO DIFF WBC: CPT

## 2023-04-20 PROCEDURE — 83735 ASSAY OF MAGNESIUM: CPT

## 2023-04-20 PROCEDURE — 36415 COLL VENOUS BLD VENIPUNCTURE: CPT

## 2023-04-20 PROCEDURE — 82962 GLUCOSE BLOOD TEST: CPT

## 2023-04-20 PROCEDURE — 85730 THROMBOPLASTIN TIME PARTIAL: CPT

## 2023-04-20 PROCEDURE — 87637 SARSCOV2&INF A&B&RSV AMP PRB: CPT

## 2023-04-20 PROCEDURE — 87186 SC STD MICRODIL/AGAR DIL: CPT

## 2023-04-20 PROCEDURE — 87635 SARS-COV-2 COVID-19 AMP PRB: CPT

## 2023-04-20 PROCEDURE — 83605 ASSAY OF LACTIC ACID: CPT

## 2023-04-20 PROCEDURE — 85027 COMPLETE CBC AUTOMATED: CPT

## 2023-05-17 NOTE — ED ADULT TRIAGE NOTE - BMI (KG/M2)
35.1 Call and spoke to Andie Galvin to let her known that doctor KELLY want her to schedule an appointment with to see what kind of lab that she will need first

## 2024-01-12 ENCOUNTER — EMERGENCY (EMERGENCY)
Facility: HOSPITAL | Age: 74
LOS: 1 days | Discharge: HOME | End: 2024-01-12
Attending: STUDENT IN AN ORGANIZED HEALTH CARE EDUCATION/TRAINING PROGRAM
Payer: MEDICARE

## 2024-01-12 VITALS
TEMPERATURE: 98 F | HEIGHT: 68 IN | RESPIRATION RATE: 18 BRPM | SYSTOLIC BLOOD PRESSURE: 130 MMHG | HEART RATE: 64 BPM | DIASTOLIC BLOOD PRESSURE: 84 MMHG | WEIGHT: 242.07 LBS | OXYGEN SATURATION: 94 %

## 2024-01-12 DIAGNOSIS — Z95.810 PRESENCE OF AUTOMATIC (IMPLANTABLE) CARDIAC DEFIBRILLATOR: Chronic | ICD-10-CM

## 2024-01-12 PROCEDURE — 99285 EMERGENCY DEPT VISIT HI MDM: CPT

## 2024-01-13 VITALS
OXYGEN SATURATION: 98 % | SYSTOLIC BLOOD PRESSURE: 127 MMHG | DIASTOLIC BLOOD PRESSURE: 80 MMHG | RESPIRATION RATE: 17 BRPM | TEMPERATURE: 98 F

## 2024-01-13 DIAGNOSIS — F20.0 PARANOID SCHIZOPHRENIA: ICD-10-CM

## 2024-01-13 PROBLEM — K21.9 GASTRO-ESOPHAGEAL REFLUX DISEASE WITHOUT ESOPHAGITIS: Chronic | Status: ACTIVE | Noted: 2023-04-04

## 2024-01-13 PROBLEM — R42 DIZZINESS AND GIDDINESS: Chronic | Status: ACTIVE | Noted: 2023-04-04

## 2024-01-13 PROBLEM — I50.9 HEART FAILURE, UNSPECIFIED: Chronic | Status: ACTIVE | Noted: 2023-04-04

## 2024-01-13 PROBLEM — E78.5 HYPERLIPIDEMIA, UNSPECIFIED: Chronic | Status: ACTIVE | Noted: 2023-04-04

## 2024-01-13 LAB
ALBUMIN SERPL ELPH-MCNC: 3.5 G/DL — SIGNIFICANT CHANGE UP (ref 3.5–5)
ALBUMIN SERPL ELPH-MCNC: 3.5 G/DL — SIGNIFICANT CHANGE UP (ref 3.5–5)
ALP SERPL-CCNC: 59 U/L — SIGNIFICANT CHANGE UP (ref 40–120)
ALP SERPL-CCNC: 59 U/L — SIGNIFICANT CHANGE UP (ref 40–120)
ALT FLD-CCNC: 29 U/L DA — SIGNIFICANT CHANGE UP (ref 10–60)
ALT FLD-CCNC: 29 U/L DA — SIGNIFICANT CHANGE UP (ref 10–60)
ANION GAP SERPL CALC-SCNC: 5 MMOL/L — SIGNIFICANT CHANGE UP (ref 5–17)
ANION GAP SERPL CALC-SCNC: 5 MMOL/L — SIGNIFICANT CHANGE UP (ref 5–17)
ANISOCYTOSIS BLD QL: SLIGHT — SIGNIFICANT CHANGE UP
ANISOCYTOSIS BLD QL: SLIGHT — SIGNIFICANT CHANGE UP
APPEARANCE UR: CLEAR — SIGNIFICANT CHANGE UP
APPEARANCE UR: CLEAR — SIGNIFICANT CHANGE UP
AST SERPL-CCNC: 13 U/L — SIGNIFICANT CHANGE UP (ref 10–40)
AST SERPL-CCNC: 13 U/L — SIGNIFICANT CHANGE UP (ref 10–40)
BASOPHILS # BLD AUTO: 0.03 K/UL — SIGNIFICANT CHANGE UP (ref 0–0.2)
BASOPHILS # BLD AUTO: 0.03 K/UL — SIGNIFICANT CHANGE UP (ref 0–0.2)
BASOPHILS NFR BLD AUTO: 0.5 % — SIGNIFICANT CHANGE UP (ref 0–2)
BASOPHILS NFR BLD AUTO: 0.5 % — SIGNIFICANT CHANGE UP (ref 0–2)
BILIRUB SERPL-MCNC: 0.4 MG/DL — SIGNIFICANT CHANGE UP (ref 0.2–1.2)
BILIRUB SERPL-MCNC: 0.4 MG/DL — SIGNIFICANT CHANGE UP (ref 0.2–1.2)
BILIRUB UR-MCNC: NEGATIVE — SIGNIFICANT CHANGE UP
BILIRUB UR-MCNC: NEGATIVE — SIGNIFICANT CHANGE UP
BUN SERPL-MCNC: 13 MG/DL — SIGNIFICANT CHANGE UP (ref 7–18)
BUN SERPL-MCNC: 13 MG/DL — SIGNIFICANT CHANGE UP (ref 7–18)
CALCIUM SERPL-MCNC: 8.7 MG/DL — SIGNIFICANT CHANGE UP (ref 8.4–10.5)
CALCIUM SERPL-MCNC: 8.7 MG/DL — SIGNIFICANT CHANGE UP (ref 8.4–10.5)
CHLORIDE SERPL-SCNC: 109 MMOL/L — HIGH (ref 96–108)
CHLORIDE SERPL-SCNC: 109 MMOL/L — HIGH (ref 96–108)
CO2 SERPL-SCNC: 27 MMOL/L — SIGNIFICANT CHANGE UP (ref 22–31)
CO2 SERPL-SCNC: 27 MMOL/L — SIGNIFICANT CHANGE UP (ref 22–31)
COLOR SPEC: YELLOW — SIGNIFICANT CHANGE UP
COLOR SPEC: YELLOW — SIGNIFICANT CHANGE UP
CREAT SERPL-MCNC: 0.92 MG/DL — SIGNIFICANT CHANGE UP (ref 0.5–1.3)
CREAT SERPL-MCNC: 0.92 MG/DL — SIGNIFICANT CHANGE UP (ref 0.5–1.3)
DIFF PNL FLD: NEGATIVE — SIGNIFICANT CHANGE UP
DIFF PNL FLD: NEGATIVE — SIGNIFICANT CHANGE UP
EGFR: 88 ML/MIN/1.73M2 — SIGNIFICANT CHANGE UP
EGFR: 88 ML/MIN/1.73M2 — SIGNIFICANT CHANGE UP
ELLIPTOCYTES BLD QL SMEAR: SLIGHT — SIGNIFICANT CHANGE UP
ELLIPTOCYTES BLD QL SMEAR: SLIGHT — SIGNIFICANT CHANGE UP
EOSINOPHIL # BLD AUTO: 0.14 K/UL — SIGNIFICANT CHANGE UP (ref 0–0.5)
EOSINOPHIL # BLD AUTO: 0.14 K/UL — SIGNIFICANT CHANGE UP (ref 0–0.5)
EOSINOPHIL NFR BLD AUTO: 2.4 % — SIGNIFICANT CHANGE UP (ref 0–6)
EOSINOPHIL NFR BLD AUTO: 2.4 % — SIGNIFICANT CHANGE UP (ref 0–6)
ETHANOL SERPL-MCNC: 15 MG/DL — HIGH (ref 0–10)
ETHANOL SERPL-MCNC: 15 MG/DL — HIGH (ref 0–10)
GLUCOSE SERPL-MCNC: 96 MG/DL — SIGNIFICANT CHANGE UP (ref 70–99)
GLUCOSE SERPL-MCNC: 96 MG/DL — SIGNIFICANT CHANGE UP (ref 70–99)
GLUCOSE UR QL: NEGATIVE MG/DL — SIGNIFICANT CHANGE UP
GLUCOSE UR QL: NEGATIVE MG/DL — SIGNIFICANT CHANGE UP
HCT VFR BLD CALC: 39.7 % — SIGNIFICANT CHANGE UP (ref 39–50)
HCT VFR BLD CALC: 39.7 % — SIGNIFICANT CHANGE UP (ref 39–50)
HGB BLD-MCNC: 12.1 G/DL — LOW (ref 13–17)
HGB BLD-MCNC: 12.1 G/DL — LOW (ref 13–17)
HYPOCHROMIA BLD QL: SLIGHT — SIGNIFICANT CHANGE UP
HYPOCHROMIA BLD QL: SLIGHT — SIGNIFICANT CHANGE UP
IMM GRANULOCYTES NFR BLD AUTO: 0.5 % — SIGNIFICANT CHANGE UP (ref 0–0.9)
IMM GRANULOCYTES NFR BLD AUTO: 0.5 % — SIGNIFICANT CHANGE UP (ref 0–0.9)
KETONES UR-MCNC: NEGATIVE MG/DL — SIGNIFICANT CHANGE UP
KETONES UR-MCNC: NEGATIVE MG/DL — SIGNIFICANT CHANGE UP
LEUKOCYTE ESTERASE UR-ACNC: NEGATIVE — SIGNIFICANT CHANGE UP
LEUKOCYTE ESTERASE UR-ACNC: NEGATIVE — SIGNIFICANT CHANGE UP
LYMPHOCYTES # BLD AUTO: 1.33 K/UL — SIGNIFICANT CHANGE UP (ref 1–3.3)
LYMPHOCYTES # BLD AUTO: 1.33 K/UL — SIGNIFICANT CHANGE UP (ref 1–3.3)
LYMPHOCYTES # BLD AUTO: 22.7 % — SIGNIFICANT CHANGE UP (ref 13–44)
LYMPHOCYTES # BLD AUTO: 22.7 % — SIGNIFICANT CHANGE UP (ref 13–44)
MAGNESIUM SERPL-MCNC: 2.2 MG/DL — SIGNIFICANT CHANGE UP (ref 1.6–2.6)
MAGNESIUM SERPL-MCNC: 2.2 MG/DL — SIGNIFICANT CHANGE UP (ref 1.6–2.6)
MANUAL SMEAR VERIFICATION: SIGNIFICANT CHANGE UP
MANUAL SMEAR VERIFICATION: SIGNIFICANT CHANGE UP
MCHC RBC-ENTMCNC: 19.6 PG — LOW (ref 27–34)
MCHC RBC-ENTMCNC: 19.6 PG — LOW (ref 27–34)
MCHC RBC-ENTMCNC: 30.5 GM/DL — LOW (ref 32–36)
MCHC RBC-ENTMCNC: 30.5 GM/DL — LOW (ref 32–36)
MCV RBC AUTO: 64.2 FL — LOW (ref 80–100)
MCV RBC AUTO: 64.2 FL — LOW (ref 80–100)
MICROCYTES BLD QL: SLIGHT — SIGNIFICANT CHANGE UP
MICROCYTES BLD QL: SLIGHT — SIGNIFICANT CHANGE UP
MONOCYTES # BLD AUTO: 0.57 K/UL — SIGNIFICANT CHANGE UP (ref 0–0.9)
MONOCYTES # BLD AUTO: 0.57 K/UL — SIGNIFICANT CHANGE UP (ref 0–0.9)
MONOCYTES NFR BLD AUTO: 9.7 % — SIGNIFICANT CHANGE UP (ref 2–14)
MONOCYTES NFR BLD AUTO: 9.7 % — SIGNIFICANT CHANGE UP (ref 2–14)
NEUTROPHILS # BLD AUTO: 3.75 K/UL — SIGNIFICANT CHANGE UP (ref 1.8–7.4)
NEUTROPHILS # BLD AUTO: 3.75 K/UL — SIGNIFICANT CHANGE UP (ref 1.8–7.4)
NEUTROPHILS NFR BLD AUTO: 64.2 % — SIGNIFICANT CHANGE UP (ref 43–77)
NEUTROPHILS NFR BLD AUTO: 64.2 % — SIGNIFICANT CHANGE UP (ref 43–77)
NITRITE UR-MCNC: NEGATIVE — SIGNIFICANT CHANGE UP
NITRITE UR-MCNC: NEGATIVE — SIGNIFICANT CHANGE UP
NRBC # BLD: 0 /100 WBCS — SIGNIFICANT CHANGE UP (ref 0–0)
NRBC # BLD: 0 /100 WBCS — SIGNIFICANT CHANGE UP (ref 0–0)
OVALOCYTES BLD QL SMEAR: SLIGHT — SIGNIFICANT CHANGE UP
OVALOCYTES BLD QL SMEAR: SLIGHT — SIGNIFICANT CHANGE UP
PH UR: 7.5 — SIGNIFICANT CHANGE UP (ref 5–8)
PH UR: 7.5 — SIGNIFICANT CHANGE UP (ref 5–8)
PLAT MORPH BLD: NORMAL — SIGNIFICANT CHANGE UP
PLAT MORPH BLD: NORMAL — SIGNIFICANT CHANGE UP
PLATELET # BLD AUTO: 411 K/UL — HIGH (ref 150–400)
PLATELET # BLD AUTO: 411 K/UL — HIGH (ref 150–400)
PLATELET COUNT - ESTIMATE: ABNORMAL
PLATELET COUNT - ESTIMATE: ABNORMAL
POIKILOCYTOSIS BLD QL AUTO: SLIGHT — SIGNIFICANT CHANGE UP
POIKILOCYTOSIS BLD QL AUTO: SLIGHT — SIGNIFICANT CHANGE UP
POLYCHROMASIA BLD QL SMEAR: SLIGHT — SIGNIFICANT CHANGE UP
POLYCHROMASIA BLD QL SMEAR: SLIGHT — SIGNIFICANT CHANGE UP
POTASSIUM SERPL-MCNC: 4.3 MMOL/L — SIGNIFICANT CHANGE UP (ref 3.5–5.3)
POTASSIUM SERPL-MCNC: 4.3 MMOL/L — SIGNIFICANT CHANGE UP (ref 3.5–5.3)
POTASSIUM SERPL-SCNC: 4.3 MMOL/L — SIGNIFICANT CHANGE UP (ref 3.5–5.3)
POTASSIUM SERPL-SCNC: 4.3 MMOL/L — SIGNIFICANT CHANGE UP (ref 3.5–5.3)
PROT SERPL-MCNC: 6.8 G/DL — SIGNIFICANT CHANGE UP (ref 6–8.3)
PROT SERPL-MCNC: 6.8 G/DL — SIGNIFICANT CHANGE UP (ref 6–8.3)
PROT UR-MCNC: NEGATIVE MG/DL — SIGNIFICANT CHANGE UP
PROT UR-MCNC: NEGATIVE MG/DL — SIGNIFICANT CHANGE UP
RAPID RVP RESULT: SIGNIFICANT CHANGE UP
RAPID RVP RESULT: SIGNIFICANT CHANGE UP
RBC # BLD: 6.18 M/UL — HIGH (ref 4.2–5.8)
RBC # BLD: 6.18 M/UL — HIGH (ref 4.2–5.8)
RBC # FLD: 17.9 % — HIGH (ref 10.3–14.5)
RBC # FLD: 17.9 % — HIGH (ref 10.3–14.5)
RBC BLD AUTO: ABNORMAL
RBC BLD AUTO: ABNORMAL
SARS-COV-2 RNA SPEC QL NAA+PROBE: SIGNIFICANT CHANGE UP
SARS-COV-2 RNA SPEC QL NAA+PROBE: SIGNIFICANT CHANGE UP
SODIUM SERPL-SCNC: 141 MMOL/L — SIGNIFICANT CHANGE UP (ref 135–145)
SODIUM SERPL-SCNC: 141 MMOL/L — SIGNIFICANT CHANGE UP (ref 135–145)
SP GR SPEC: 1.02 — SIGNIFICANT CHANGE UP (ref 1–1.03)
SP GR SPEC: 1.02 — SIGNIFICANT CHANGE UP (ref 1–1.03)
TROPONIN I, HIGH SENSITIVITY RESULT: 10.5 NG/L — SIGNIFICANT CHANGE UP
TROPONIN I, HIGH SENSITIVITY RESULT: 10.5 NG/L — SIGNIFICANT CHANGE UP
UROBILINOGEN FLD QL: 1 MG/DL — SIGNIFICANT CHANGE UP (ref 0.2–1)
UROBILINOGEN FLD QL: 1 MG/DL — SIGNIFICANT CHANGE UP (ref 0.2–1)
WBC # BLD: 5.85 K/UL — SIGNIFICANT CHANGE UP (ref 3.8–10.5)
WBC # BLD: 5.85 K/UL — SIGNIFICANT CHANGE UP (ref 3.8–10.5)
WBC # FLD AUTO: 5.85 K/UL — SIGNIFICANT CHANGE UP (ref 3.8–10.5)
WBC # FLD AUTO: 5.85 K/UL — SIGNIFICANT CHANGE UP (ref 3.8–10.5)

## 2024-01-13 PROCEDURE — 81003 URINALYSIS AUTO W/O SCOPE: CPT

## 2024-01-13 PROCEDURE — 36415 COLL VENOUS BLD VENIPUNCTURE: CPT

## 2024-01-13 PROCEDURE — 87086 URINE CULTURE/COLONY COUNT: CPT

## 2024-01-13 PROCEDURE — 70450 CT HEAD/BRAIN W/O DYE: CPT | Mod: 26,MA

## 2024-01-13 PROCEDURE — 72131 CT LUMBAR SPINE W/O DYE: CPT | Mod: 26,MA

## 2024-01-13 PROCEDURE — 83735 ASSAY OF MAGNESIUM: CPT

## 2024-01-13 PROCEDURE — 99285 EMERGENCY DEPT VISIT HI MDM: CPT | Mod: 25

## 2024-01-13 PROCEDURE — 90792 PSYCH DIAG EVAL W/MED SRVCS: CPT | Mod: 2W

## 2024-01-13 PROCEDURE — 80053 COMPREHEN METABOLIC PANEL: CPT

## 2024-01-13 PROCEDURE — 85025 COMPLETE CBC W/AUTO DIFF WBC: CPT

## 2024-01-13 PROCEDURE — 70450 CT HEAD/BRAIN W/O DYE: CPT | Mod: MA

## 2024-01-13 PROCEDURE — 0225U NFCT DS DNA&RNA 21 SARSCOV2: CPT

## 2024-01-13 PROCEDURE — 82962 GLUCOSE BLOOD TEST: CPT

## 2024-01-13 PROCEDURE — 80307 DRUG TEST PRSMV CHEM ANLYZR: CPT

## 2024-01-13 PROCEDURE — 71045 X-RAY EXAM CHEST 1 VIEW: CPT

## 2024-01-13 PROCEDURE — 71045 X-RAY EXAM CHEST 1 VIEW: CPT | Mod: 26

## 2024-01-13 PROCEDURE — 84484 ASSAY OF TROPONIN QUANT: CPT

## 2024-01-13 PROCEDURE — 72131 CT LUMBAR SPINE W/O DYE: CPT | Mod: MA

## 2024-01-13 NOTE — ED BEHAVIORAL HEALTH ASSESSMENT NOTE - RISK ASSESSMENT
Hx notable for paranoid schizophrenia with chronic breakthrough positive symptoms (AVH, paranoia) on current med reg of abilify and thorazine. On exam he is irritable, preoccupied with several paranoid and somatic delusions but overall cognitively intact, no clear deficits on testing concerning for AMS. He is denying SI/HI and feels safe at group home. Impression is that this is a mild exacerbation of baseline schizophrenia that would be best managed by outpatient psychiatrist at group home. He may benefit from clozapine trial given chronic residual psychotic symptoms on current regimen and prior failed antipsychotic trials. He may also need follow up imaging for finding on CT head c/f aneurysm; it unlikely that lesion would cause current decompensation. He is psychiatrically cleared for discharge back to group home; there are no acute safety concerns. RF: elderly man, hx psychosis, hx psych hospitalizations, remote hx of SA per  collateral, chronic paranoia/somatization/delusions, medical issues  PF: support from nursing home environment, not current SI/HI/agitation or dysregulation, reports treatment adherence, engaged in psych care

## 2024-01-13 NOTE — ED ADULT NURSE NOTE - NSFALLUNIVINTERV_ED_ALL_ED
Bed/Stretcher in lowest position, wheels locked, appropriate side rails in place/Call bell, personal items and telephone in reach/Instruct patient to call for assistance before getting out of bed/chair/stretcher/Non-slip footwear applied when patient is off stretcher/[place] to call system/Physically safe environment - no spills, clutter or unnecessary equipment/Purposeful proactive rounding/Room/bathroom lighting operational, light cord in reach Bed/Stretcher in lowest position, wheels locked, appropriate side rails in place/Call bell, personal items and telephone in reach/Instruct patient to call for assistance before getting out of bed/chair/stretcher/Non-slip footwear applied when patient is off stretcher/[place] to call system/Physically safe environment - no spills, clutter or unnecessary equipment/Purposeful proactive rounding/Room/bathroom lighting operational, light cord in reach

## 2024-01-13 NOTE — ED BEHAVIORAL HEALTH ASSESSMENT NOTE - DESCRIPTION
Per HPI / 1 liner see HPI 1-liner VSS WNL, BMP, LFT WNL, Hgb 12.1, UA (-), COVID (-), CT head w/o acute process, showing L ICA prominence c/f aneurysm as per hpi

## 2024-01-13 NOTE — ED BEHAVIORAL HEALTH ASSESSMENT NOTE - REFERRAL / APPOINTMENT DETAILS
continue with Dr. Rivera at [facility] continue with Dr. Rivera at [facility]

## 2024-01-13 NOTE — ED BEHAVIORAL HEALTH ASSESSMENT NOTE - NSBHATTESTCOMMENTATTENDFT_PSY_A_CORE
74yo M, domiciled at assisted living, on SSD, past psychiatric history of chronic paranoid schizophrenia, multiple psych admissions (last at [place] for 5/14-6/14 for acute paranoia), hx remote SA per  collateral, followed by psychiatrist at residence, on Abilify and Thorazine which pt reports taking, no known substance use, multiple medical conditions including HTN, HLD, GERD, CHF, AICD, and vertigo who was BIBEMS for AMS. Pt reportedly was having visual hallucinations, possible auditory hallucinations, out of body experiences.    Case seen and discussed with resident.     On interview, patient is calm, cooperative, and answers questions linearly. At times does have tangential paranoid comments and somatic delusions, however does have medical issues as well. Currently is AOx3, does not present overall disorganized, is calm and cooperative, denies SI/HI. Patient reports that he plans on continuing his psych meds and feels safe at his residence. Per collateral, psychotic sxs are chronic and pt appears to be at baseline. No recent SI or agitation. Given this information, would r/o delirium especially given recent visual hallucinations. Medical workup per ED. Can also consider optho and neurology referrals given pt's report of difficulty with vision and imaging suggestive of r/o aneurysm and MRI. Currently does not present with any primary acute psych concern that meets criteria for IPP. Will be Treat and Release. Continue current medications and f/up with outpatient psychiatrist. 72yo M, domiciled at assisted living, on SSD, past psychiatric history of chronic paranoid schizophrenia, multiple psych admissions (last at [place] for 5/14-6/14 for acute paranoia), hx remote SA per  collateral, followed by psychiatrist at residence, on Abilify and Thorazine which pt reports taking, no known substance use, multiple medical conditions including HTN, HLD, GERD, CHF, AICD, and vertigo who was BIBEMS for AMS. Pt reportedly was having visual hallucinations, possible auditory hallucinations, out of body experiences.    Case seen and discussed with resident.     On interview, patient is calm, cooperative, and answers questions linearly. At times does have tangential paranoid comments and somatic delusions, however does have medical issues as well. Currently is AOx3, does not present overall disorganized, is calm and cooperative, denies SI/HI. Patient reports that he plans on continuing his psych meds and feels safe at his residence. Per collateral, psychotic sxs are chronic and pt appears to be at baseline. No recent SI or agitation. Given this information, would r/o delirium especially given recent visual hallucinations. Medical workup per ED. Can also consider optho and neurology referrals given pt's report of difficulty with vision and imaging suggestive of r/o aneurysm and MRI. Currently does not present with any primary acute psych concern that meets criteria for IPP. Will be Treat and Release. Continue current medications and f/up with outpatient psychiatrist.

## 2024-01-13 NOTE — ED BEHAVIORAL HEALTH ASSESSMENT NOTE - SUMMARY
Germain Kimble is a 72 y/o man,  domiciled at [facility] for 20 years, on SSD, with a past psych hx of chronic paranoid schizophrenia, multiple psych admissions (last at [place] 5/14-6/14/23 for paranoia), no prior SA/NSSIB, sees [facility] psychiatrist monthly currently on Abilify 10mg and thorazine 50mg TID, no legal/substance use history, PMH HTN, HLD, CHF, AICD, GERD, and vertigo who was BIB EMS from [facility] for AMS, reportedly he was seeing heads coming out of the ceiling and reporting out of body experiences. Germain Kimble is a 74 y/o man,  domiciled at [facility] for 20 years, on SSD, with a past psych hx of chronic paranoid schizophrenia, multiple psych admissions (last at [place] 5/14-6/14/23 for paranoia), no prior SA/NSSIB, sees senior living psychiatrist monthly currently on Abilify 10mg and thorazine 50mg TID, no legal/substance use history, PMH HTN, HLD, CHF, AICD, GERD, and vertigo who was BIB EMS from senior living for AMS, reportedly he was seeing heads coming out of the ceiling and reporting out of body experiences. Germain Kimble is a 74 y/o man,  domiciled at [facility] for 20 years, on SSD, with a past psych hx of chronic paranoid schizophrenia, multiple psych admissions (last at [place] 5/14-6/14/23 for paranoia), no prior SA/NSSIB, sees [facility] psychiatrist monthly currently on Abilify 10mg and thorazine 50mg TID, no legal/substance use history, PMH HTN, HLD, CHF, AICD, GERD, and vertigo who was B[facility] from [facility] for AMS, reportedly he was seeing heads coming out of the ceiling and reporting out of body experiences.    Hx notable for paranoid schizophrenia with chronic breakthrough positive symptoms (paranoia, somatic focuses, delusions) on current med reg of abilify and thorazine. However he has been at baseline per collateral without any recent SI/HI/agitation. Patient reports feeling safe in group home. Recent hallucinations are mostly visual, would also r/o delirum or other medical causes. As per imaging also there is concern for possible aneurysm, medical workup per ED/medicine. Currently patient is not a danger to himself or others. Collateral does not express any acute psych safety conerns. He is psychiatrically cleared for discharge back to group home. Germain Kimble is a 74 y/o man,  domiciled at [facility] for 20 years, on SSD, with a past psych hx of chronic paranoid schizophrenia, multiple psych admissions (last at [place] 5/14-6/14/23 for paranoia), no prior SA/NSSIB, sees shelter psychiatrist monthly currently on Abilify 10mg and thorazine 50mg TID, no legal/substance use history, PMH HTN, HLD, CHF, AICD, GERD, and vertigo who was BIB EMS from shelter for AMS, reportedly he was seeing heads coming out of the ceiling and reporting out of body experiences.    Hx notable for paranoid schizophrenia with chronic breakthrough positive symptoms (paranoia, somatic focuses, delusions) on current med reg of abilify and thorazine. However he has been at baseline per collateral without any recent SI/HI/agitation. Patient reports feeling safe in group home. Recent hallucinations are mostly visual, would also r/o delirum or other medical causes. As per imaging also there is concern for possible aneurysm, medical workup per ED/medicine. Currently patient is not a danger to himself or others. Collateral does not express any acute psych safety conerns. He is psychiatrically cleared for discharge back to group home.

## 2024-01-13 NOTE — ED PROVIDER NOTE - NSFOLLOWUPINSTRUCTIONS_ED_ALL_ED_FT
Please follow up with your PMD or Medicine Clinic in 2-3 days.  Follow up with Psychiatry in 1 week.  Follow up with Neurology & Opthalmology in 1 week regarding CT head results  Return to the ER for worsening or concerning symptoms.    - - - - - - - - - - - - -  Schizophrenia  Schizophrenia is a mental illness. It may cause disturbed or disorganized thinking, speech, or behavior. People with schizophrenia have problems functioning in one or more areas of life. People with schizophrenia are at increased risk for suicide, certain long-term (chronic) physical illnesses, and unhealthy behaviors, such as smoking and drug use.    People who have family members with schizophrenia are at higher risk of developing the illness. Schizophrenia affects men and women equally, but it appears at an earlier age in men, usually in teenage or early adult years.    What are the causes?  The cause of this condition is not known. Episodes are often triggered by major life changes or events, such as:  Family stress.  A stressful life event, such as going to college, serving in the , becoming pregnant, or having a child.  Divorce.  Loss of a loved one.  What increases the risk?  The following factors may make you more likely to develop this condition:  Having a family member who has schizophrenia. Some gene combinations may increase the risk, but there is no single gene that causes schizophrenia.  Problems that affected your brain development before you were born.  Use of drugs that change how the mind works. These drugs include cannabis, cocaine, and amphetamines.  Problems in brain chemistry or chemicals in the brain (neurotransmitters).  What are the signs or symptoms?  The earliest symptoms are often subtle and may go unnoticed until the illness becomes more severe and the person has a severe loss of contact with reality (acute episode of psychosis). Symptoms of schizophrenia may be ongoing or may come and go in severity.    Symptoms may include:  Seeing, hearing, tasting, smelling, or feeling things that are not real (hallucinating).  Having false beliefs (delusions). You may believe that you are being attacked, persecuted, cheated, or conspired against.  Speaking in a way that does not make sense to others or is hard to understand.  Behaving in an odd, confused, unfocused, withdrawn, or disorganized way.  Having extremely overactive or underactive motor activity (catatonia). Motor activity is any action that involves the muscles.  Having difficulty expressing emotions.  Losing willpower.  Symptoms usually affect major areas of life, such as work, school, relationships, or self-care.    How is this diagnosed?  Schizophrenia is diagnosed through an assessment by a mental health care provider.  Your mental health care provider may ask questions about:  Your thoughts, behavior, and mood.  Your ability to function in daily life.  Your medical history.  Any use of alcohol, drugs, or medicines, including prescription medicines.  You may have blood tests and imaging exams.  How is this treated?  A group of people participating in a counseling session.  Schizophrenia is a chronic illness that is best controlled with continuous treatment rather than treatment only when symptoms occur. The following treatments are used to manage schizophrenia:  Medicine. This is the most important form of treatment for schizophrenia. Antipsychotic medicines are usually prescribed to help manage schizophrenia. Other types of medicine may be added to relieve any symptoms that may occur despite the use of antipsychotic medicines.  Counseling or talk therapy. Individual, group, or family counseling may be helpful in providing education, support, and guidance. Many people also benefit from social skills and job skills (vocational) training.  Electroconvulsive therapy. This is a procedure in which electricity is applied to the brain through the scalp. This may be used to treat catatonic schizophrenia or schizophrenia in people who cannot take medicine or do not respond to medicine and counseling.  A combination of medicine and counseling is best for managing the disorder.    Follow these instructions at home:  Lifestyle    Keep stress under control. Stress may trigger an episode of psychosis and make symptoms worse.  Try to get as much sleep as you can.  Avoid alcohol and drugs. They can affect how medicines work and may make symptoms worse.  Avoid using products that contain nicotine or tobacco. These products include cigarettes, chewing tobacco, and vaping devices, such as e-cigarettes. If you need help quitting, ask your health care provider.  General instructions    Three members of a family, laughing and playing together.   Surround yourself with people who care about you and can help you manage your condition.  Take over-the-counter medicines, prescription medicines, and herbal remedies only as told by your health care provider.  Keep all follow-up visits. This is important.  Contact a health care provider if:  You have a bad response to changes in medicines or to your treatment plan.  You have trouble falling sleep.  You have a low mood that will not go away.  You are using drugs or alcohol, tobacco products, or too much caffeine.  Get help right away if:  You feel out of control.  You or others notice warning signs of suicide, such as:  Using drugs or alcohol more often.  Expressing feelings of not having a purpose in life or feeling trapped, guilty, anxious, agitated, or hopeless.  Withdrawing from friends and family.  Showing uncontrolled anger, recklessness, and dramatic mood changes.  Talking about suicide or searching for methods of suicide.  Get help right away if you feel like you may hurt yourself or others, or have thoughts about taking your own life. Go to your nearest emergency room or:  Call 911.  Call the National Suicide Prevention Lifeline at 1-238.465.2574 or 660. This is open 24 hours a day.  Text the Crisis Text Line at 838473.  Summary  Schizophrenia is a mental illness that causes disturbed or disorganized thinking, speech, or behavior.  The cause of this condition is not known. Episodes are often triggered by major life changes or events.  Keep stress under control. Stress may trigger an episode of psychosis and make symptoms worse.  Avoid alcohol and drugs. They can affect how medicines work and may make symptoms worse.  Get help right away if you feel out of control or feel like you may hurt yourself.  This information is not intended to replace advice given to you by your health care provider. Make sure you discuss any questions you have with your health care provider. Please follow up with your PMD or Medicine Clinic in 2-3 days.  Follow up with Psychiatry in 1 week.  Follow up with Neurology & Opthalmology in 1 week regarding CT head results  Return to the ER for worsening or concerning symptoms.    - - - - - - - - - - - - -  Schizophrenia  Schizophrenia is a mental illness. It may cause disturbed or disorganized thinking, speech, or behavior. People with schizophrenia have problems functioning in one or more areas of life. People with schizophrenia are at increased risk for suicide, certain long-term (chronic) physical illnesses, and unhealthy behaviors, such as smoking and drug use.    People who have family members with schizophrenia are at higher risk of developing the illness. Schizophrenia affects men and women equally, but it appears at an earlier age in men, usually in teenage or early adult years.    What are the causes?  The cause of this condition is not known. Episodes are often triggered by major life changes or events, such as:  Family stress.  A stressful life event, such as going to college, serving in the , becoming pregnant, or having a child.  Divorce.  Loss of a loved one.  What increases the risk?  The following factors may make you more likely to develop this condition:  Having a family member who has schizophrenia. Some gene combinations may increase the risk, but there is no single gene that causes schizophrenia.  Problems that affected your brain development before you were born.  Use of drugs that change how the mind works. These drugs include cannabis, cocaine, and amphetamines.  Problems in brain chemistry or chemicals in the brain (neurotransmitters).  What are the signs or symptoms?  The earliest symptoms are often subtle and may go unnoticed until the illness becomes more severe and the person has a severe loss of contact with reality (acute episode of psychosis). Symptoms of schizophrenia may be ongoing or may come and go in severity.    Symptoms may include:  Seeing, hearing, tasting, smelling, or feeling things that are not real (hallucinating).  Having false beliefs (delusions). You may believe that you are being attacked, persecuted, cheated, or conspired against.  Speaking in a way that does not make sense to others or is hard to understand.  Behaving in an odd, confused, unfocused, withdrawn, or disorganized way.  Having extremely overactive or underactive motor activity (catatonia). Motor activity is any action that involves the muscles.  Having difficulty expressing emotions.  Losing willpower.  Symptoms usually affect major areas of life, such as work, school, relationships, or self-care.    How is this diagnosed?  Schizophrenia is diagnosed through an assessment by a mental health care provider.  Your mental health care provider may ask questions about:  Your thoughts, behavior, and mood.  Your ability to function in daily life.  Your medical history.  Any use of alcohol, drugs, or medicines, including prescription medicines.  You may have blood tests and imaging exams.  How is this treated?  A group of people participating in a counseling session.  Schizophrenia is a chronic illness that is best controlled with continuous treatment rather than treatment only when symptoms occur. The following treatments are used to manage schizophrenia:  Medicine. This is the most important form of treatment for schizophrenia. Antipsychotic medicines are usually prescribed to help manage schizophrenia. Other types of medicine may be added to relieve any symptoms that may occur despite the use of antipsychotic medicines.  Counseling or talk therapy. Individual, group, or family counseling may be helpful in providing education, support, and guidance. Many people also benefit from social skills and job skills (vocational) training.  Electroconvulsive therapy. This is a procedure in which electricity is applied to the brain through the scalp. This may be used to treat catatonic schizophrenia or schizophrenia in people who cannot take medicine or do not respond to medicine and counseling.  A combination of medicine and counseling is best for managing the disorder.    Follow these instructions at home:  Lifestyle    Keep stress under control. Stress may trigger an episode of psychosis and make symptoms worse.  Try to get as much sleep as you can.  Avoid alcohol and drugs. They can affect how medicines work and may make symptoms worse.  Avoid using products that contain nicotine or tobacco. These products include cigarettes, chewing tobacco, and vaping devices, such as e-cigarettes. If you need help quitting, ask your health care provider.  General instructions    Three members of a family, laughing and playing together.   Surround yourself with people who care about you and can help you manage your condition.  Take over-the-counter medicines, prescription medicines, and herbal remedies only as told by your health care provider.  Keep all follow-up visits. This is important.  Contact a health care provider if:  You have a bad response to changes in medicines or to your treatment plan.  You have trouble falling sleep.  You have a low mood that will not go away.  You are using drugs or alcohol, tobacco products, or too much caffeine.  Get help right away if:  You feel out of control.  You or others notice warning signs of suicide, such as:  Using drugs or alcohol more often.  Expressing feelings of not having a purpose in life or feeling trapped, guilty, anxious, agitated, or hopeless.  Withdrawing from friends and family.  Showing uncontrolled anger, recklessness, and dramatic mood changes.  Talking about suicide or searching for methods of suicide.  Get help right away if you feel like you may hurt yourself or others, or have thoughts about taking your own life. Go to your nearest emergency room or:  Call 911.  Call the National Suicide Prevention Lifeline at 1-778.227.8001 or 050. This is open 24 hours a day.  Text the Crisis Text Line at 480689.  Summary  Schizophrenia is a mental illness that causes disturbed or disorganized thinking, speech, or behavior.  The cause of this condition is not known. Episodes are often triggered by major life changes or events.  Keep stress under control. Stress may trigger an episode of psychosis and make symptoms worse.  Avoid alcohol and drugs. They can affect how medicines work and may make symptoms worse.  Get help right away if you feel out of control or feel like you may hurt yourself.  This information is not intended to replace advice given to you by your health care provider. Make sure you discuss any questions you have with your health care provider.

## 2024-01-13 NOTE — ED BEHAVIORAL HEALTH NOTE - BEHAVIORAL HEALTH NOTE
==================       PRE-HOSPITAL COURSE       ==================       Name: Germain Kimble    SOURCE: [facility].    DETAILS: BIBSelf for reported altered mental status. Reported seeing heads coming out of ceiling,      ============       ED COURSE       ============       SOURCE: [facility]    ARRIVAL: BIBSelf and has been compliant and cooperative with the triage process at the time of this documentation.     BELONGINGS:    Per Rn, the patient is in a gown with a 1:1. No contraband reported.      BEHAVIOR:      The patient was brought to the ED reportedly for worsening altered mental status. Patient reported seeing paranoid delusions to ED when he arrived of heads appearing in his home. reportedly is AAOx3. The Rn reports the patient appears to have decent hygiene. The Rn reports the patient is not displaying agitation/aggression towards staff or others while in the ED. There are no visible marks/cut/bruises on the patient's body. No reported SI/Hi.      TREATMENT:    None     VISITORS:    None     -----------------------------------------------      COVID Exposure Screen- collateral (i.e. third-party, chart review, belongings, etc; include EMS and ED staff)      ---------------------------------------------------      1. Has the patient had a COVID-19 test in the last 90 days? Unknown.      2. Has the patient tested positive for COVID-19 antibodies? Unknown.      3.Has the patient received 2 doses of the COVID-19 vaccine?  Unknown.      4. In the past 10 days, has the patient been around anyone with a positive COVID-19 test?* Unknown.

## 2024-01-13 NOTE — ED PROVIDER NOTE - CLINICAL SUMMARY MEDICAL DECISION MAKING FREE TEXT BOX
73-year-old male hx of paranoid schizophrenia, HTN, sent in from facility for AMS and worsening hallucinations. Will check labs, CXR, urine, CTH, Psych consult if medically cleared.

## 2024-01-13 NOTE — ED BEHAVIORAL HEALTH ASSESSMENT NOTE - NSBHATTESTBILLING_PSY_A_CORE
45616-Nwyqxxuizgn diagnostic evaluation with medical services 28688-Abcoibvkloz diagnostic evaluation with medical services

## 2024-01-13 NOTE — ED PROVIDER NOTE - NSFOLLOWUPCLINICSTOKEN_GEN_ALL_ED_FT
799585:1-3 Days|| ||00\01||False;827468:4-6 Days|| ||00\01||False;702896:4-6 Days|| ||00\01||False;372055:4-6 Days|| ||00\01||False;782917:4-6 Days|| ||00\01||False;033895:4-6 Days|| ||00\01||False; 189383:1-3 Days|| ||00\01||False;668396:4-6 Days|| ||00\01||False;187945:4-6 Days|| ||00\01||False;809361:4-6 Days|| ||00\01||False;165727:4-6 Days|| ||00\01||False;411106:4-6 Days|| ||00\01||False;

## 2024-01-13 NOTE — BH SAFETY PLAN - WARNING SIGN 1
Visual hallucinations Having auditory or visual hallucinations Hearing things or seeing things that other people cannot see or hear

## 2024-01-13 NOTE — ED BEHAVIORAL HEALTH ASSESSMENT NOTE - ADDITIONAL DETAILS / COMMENTS
A&O x3, knew presidents, could say days of week backward, calculates 7q in $1.75, naming & repetition intact, short term memory intact, could recall general psychiatric history with ease.

## 2024-01-13 NOTE — ED PROVIDER NOTE - PATIENT PORTAL LINK FT
You can access the FollowMyHealth Patient Portal offered by [facility] by registering at the following website: [email or web address]. By joining [employer]’s FollowMyHealth portal, you will also be able to view your health information using other applications (apps) compatible with our system. You can access the FollowMyHealth Patient Portal offered by [facility] by registering at the following website: [email or web address]. By joining [employer]’s FollowMyHealth portal, you will also be able to view your health information using other applications (apps) compatible with our system.

## 2024-01-13 NOTE — ED PROVIDER NOTE - PROGRESS NOTE DETAILS
Psych called - will call back to take full story. Received from Dr Andrzej leslie TelePsych consult pending.   Care discussed w Dr Connell Attending of Psych service. No indication for inpatient Psych admit at this time. No indication for medication adjustment. Recommending close outpatient follow up.  Incidental findings on head CT noted.   Care d/w Dr Mistry covering for Dr Castro at [facility] and advised regarding need for outpatient Neuro/Ophthalmology evaluations and possible need for MRI  Results reviewed.  Pt well-appearing  Pt advised regarding symptomatic/supportive care, importance of outpatient follow up, and symptoms to prompt ED return. Received from Dr Andrzej leslie TelePsych consult pending.   Care discussed w Dr Connell Attending of Psych service. No indication for inpatient Psych admit at this time. No indication for medication adjustment. Recommending close outpatient follow up.  Incidental findings on head CT noted.   Care d/w Dr Mistry covering for Dr Castro at [facility] and advised regarding need for outpatient Neuro/Ophthalmology evaluations and possible need for MRI  Results reviewed.  Pt well-appearing  Pt advised regarding symptomatic/supportive care, importance of outpatient follow up, and symptoms to prompt ED return.

## 2024-01-13 NOTE — BH SAFETY PLAN - SUICIDE PREVENTION LIFELINE PHONES
Suicide Prevention Lifeline Phone: [phone] TALK ([phone]) Suicide Prevention Lifeline Phone: [phone] [phone] ([phone])

## 2024-01-13 NOTE — ED ADULT NURSE NOTE - ED STAT RN HANDOFF DETAILS 3
Patient is alert and oriented x3. Patient was evaluated by Psych. No sign of acute distress noted. Safety , fall, and constant observation maintained. Endorsed to KAVYA Duarte.

## 2024-01-13 NOTE — ED BEHAVIORAL HEALTH ASSESSMENT NOTE - NSSUICPROTFACT_PSY_ALL_CORE
Fear of death or the actual act of killing self Identifies reasons for living/Supportive social network of family or friends/Fear of death or the actual act of killing self/Positive therapeutic relationships

## 2024-01-13 NOTE — ED BEHAVIORAL HEALTH ASSESSMENT NOTE - HPI (INCLUDE ILLNESS QUALITY, SEVERITY, DURATION, TIMING, CONTEXT, MODIFYING FACTORS, ASSOCIATED SIGNS AND SYMPTOMS)
Germain Kimble is a 74 y/o man,  domiciled at [facility] for 20 years, on SSD, with a past psych hx of chronic paranoid schizophrenia, multiple psych admissions (last at [facility] 5/14-6/14/23 for paranoia), no prior SA/NSSIB, sees [facility] psychiatrist monthly currently on Abilify 10mg and thorazine 50mg TID, no legal/substance use history, PMH HTN, HLD, CHF, AICD, GERD, and vertigo who was BIB EMS from [facility] for AMS, reportedly he was seeing heads coming out of the ceiling and reporting out of body experiences.     At baseline, lives at [facility] [place] with roommate, he is independent in ADLs but depends on staff/family for iADLs. Hx of schizophrenia since 20s, multiple admissions, periods of tx non adherence, and at baseline he hears voices and paranoia fluctuates. Reports that he's never worked and lived with his mom until 20 years ago when his dad passed away and his mom moved to [place]. His siblings put him at [facility] and he has lived there since. He reports that he gets along with staff, doctors, and has friends there. Reports he's tried prolixin and haldol in the past, but is current on abilify 10mg daily and thorazine 50mg TID, sees Dr. Rivera monthly ("he's a great julio césar!"). He had a recent admission at [facility] in 5/2023 but medication regimen was kept. This writer attempted to contact Dr. Rivera but it went to voicemail.     Interview limited by patient going on multiple paranoid tangents but generally he is cooperative and redirectable. His most consistent concern throughout interview is that there are chemicals in his medications that he is given daily in a cup by staff that is compromising his physical health and making his vision go dark. He reports a number of somatic delusions, "I don't have heart problems, I don't have a heart!" Reports that he takes medications "religiously" and it is administered by staff daily. He reports that he came to the ED for "advice and a diagnosis." He reports that he's not depressed and he feels that his schizophrenia is well controlled on thorazine although acknowledges that over the past 2-3 years, he's been having more hallucinations. Denying current AVH, but reports that last night he was having a conversation with his late mother and was seeing "heads in the ceiling." Reports that he feels safe in the nursing home and is not paranoid that staff is trying to harm him intentionally. He thinks he needs to come off all of his medications for 6 months but agrees that he's willing to discuss with the nursing home. He reports financial concerns about the cost of the ED visit. Patient denied depression, reports that he is not sure if life is worth living, but denies any suicidal intent, methods or plans. Denied HI. Germain Kimble is a 72 y/o man,  domiciled at [facility] for 20 years, on SSD, with a past psych hx of chronic paranoid schizophrenia, multiple psych admissions (last at [facility] 5/14-6/14/23 for paranoia), no prior SA/NSSIB, sees [facility] psychiatrist monthly currently on Abilify 10mg and thorazine 50mg TID, no legal/substance use history, PMH HTN, HLD, CHF, AICD, GERD, and vertigo who was BIB EMS from [facility] for AMS, reportedly he was seeing heads coming out of the ceiling and reporting out of body experiences.     At baseline, lives at [facility] nursing [place] with roommate, he is independent in ADLs but depends on staff/family for iADLs. Hx of schizophrenia since 20s, multiple admissions, periods of tx non adherence, and at baseline he hears voices and paranoia fluctuates. Reports that he's never worked and lived with his mom until 20 years ago when his dad passed away and his mom moved to [place]. His siblings put him at [facility] and he has lived there since. He reports that he gets along with staff, doctors, and has friends there. Reports he's tried prolixin and haldol in the past, but is current on abilify 10mg daily and thorazine 50mg TID, sees Dr. Rivera monthly ("he's a great julio césar!"). He had a recent admission at [facility] in 5/2023 but medication regimen was kept. This writer attempted to contact Dr. Rivera but it went to voicemail.     Interview limited by patient going on multiple paranoid tangents but generally he is cooperative and redirectable. His most consistent concern throughout interview is that there are chemicals in his medications that he is given daily in a cup by staff that is compromising his physical health and making his vision go dark. He reports a number of somatic delusions, "I don't have heart problems, I don't have a heart!" Reports that he takes medications "religiously" and it is administered by staff daily. He reports that he came to the ED for "advice and a diagnosis." He reports that he's not depressed and he feels that his schizophrenia is well controlled on thorazine although acknowledges that over the past 2-3 years, he's been having more hallucinations. Denying current AVH, but reports that last night he was having a conversation with his late mother and was seeing "heads in the ceiling." Reports that he feels safe in the nursing home and is not paranoid that staff is trying to harm him intentionally. He thinks he needs to come off all of his medications for 6 months but agrees that he's willing to discuss with the nursing home. He reports financial concerns about the cost of the ED visit. Patient denied depression, reports that he is not sure if life is worth living, but denies any suicidal intent, methods or plans. Denied HI. Germain Kimble is a 72 y/o man,  domiciled at [facility] for 20 years, on SSD, with a past psych hx of chronic paranoid schizophrenia, multiple psych admissions (last at [facility] 5/14-6/14/23 for paranoia), no prior SA/NSSIB, sees [facility] psychiatrist monthly currently on Abilify 10mg and thorazine 50mg TID, no legal/substance use history, PMH HTN, HLD, CHF, AICD, GERD, and vertigo who was BIB EMS from [facility] for AMS, reportedly he was seeing heads coming out of the ceiling and reporting out of body experiences.     At baseline, lives at [facility] nursing [place] with roommate, he is independent in ADLs but depends on staff/family for iADLs. Hx of schizophrenia since 20s, multiple admissions, periods of tx non adherence, and at baseline he hears voices and paranoia fluctuates. Reports that he's never worked and lived with his mom until 20 years ago when his dad passed away and his mom moved to [place]. His siblings put him at [facility] and he has lived there since. He reports that he gets along with staff, doctors, and has friends there. Reports he's tried prolixin and haldol in the past, but is current on abilify 10mg daily and thorazine 50mg TID, sees Dr. Rivera monthly ("he's a great julio césar!"). He had a recent admission at [facility] in 5/2023 but medication regimen was kept. This writer attempted to contact Dr. Rivera but it went to voicemail.     Interview limited by patient going on multiple paranoid tangents but generally he is cooperative and redirectable. Reports concerns about his vision. At times, does have somatic delusions and states "I don't have heart problems, I don't have a heart!" Despite this, reports that he takes medications "religiously" and it is administered by staff daily. He reports that he came to the ED for "advice and a diagnosis." He reports that he's not depressed and he feels that his schizophrenia is well controlled on thorazine although acknowledges that over the past 2-3 years he has been having more visual hallucinations. Also reports hx of auditory hallucinations. Denying current AVH, but reports that last night he was having a conversation with his late mother and was seeing "heads in the ceiling." Reports that he feels safe in the nursing home. Does not believe that staff is trying to harm him. Reports that he is taking all his psych medications at the nursing home. Reports moments of passive SI in the past but states that he wants to live and wants his vision to improve. Denies having any active SI/intent/plan in the past. Denies any HI. Germain Kimble is a 74 y/o man,  domiciled at [facility] for 20 years, on SSD, with a past psych hx of chronic paranoid schizophrenia, multiple psych admissions (last at [facility] 5/14-6/14/23 for paranoia), no prior SA/NSSIB, sees [facility] psychiatrist monthly currently on Abilify 10mg and thorazine 50mg TID, no legal/substance use history, PMH HTN, HLD, CHF, AICD, GERD, and vertigo who was BIB EMS from [facility] for AMS, reportedly he was seeing heads coming out of the ceiling and reporting out of body experiences.     At baseline, lives at [facility] nursing [place] with roommate, he is independent in ADLs but depends on staff/family for iADLs. Hx of schizophrenia since 20s, multiple admissions, periods of tx non adherence, and at baseline he hears voices and paranoia fluctuates. Reports that he's never worked and lived with his mom until 20 years ago when his dad passed away and his mom moved to [place]. His siblings put him at [facility] and he has lived there since. He reports that he gets along with staff, doctors, and has friends there. Reports he's tried prolixin and haldol in the past, but is current on abilify 10mg daily and thorazine 50mg TID, sees Dr. Rivera monthly ("he's a great julio césar!"). He had a recent admission at [facility] in 5/2023 but medication regimen was kept. This writer attempted to contact Dr. Rivera but it went to voicemail.     Interview limited by patient going on multiple paranoid tangents but generally he is cooperative and redirectable. Reports concerns about his vision. At times, does have somatic delusions and states "I don't have heart problems, I don't have a heart!" Despite this, reports that he takes medications "religiously" and it is administered by staff daily. He reports that he came to the ED for "advice and a diagnosis." He reports that he's not depressed and he feels that his schizophrenia is well controlled on thorazine although acknowledges that over the past 2-3 years he has been having more visual hallucinations. Also reports hx of auditory hallucinations. Denying current AVH, but reports that last night he was having a conversation with his late mother and was seeing "heads in the ceiling." Reports that he feels safe in the nursing home. Does not believe that staff is trying to harm him. Reports that he is taking all his psych medications at the nursing home. Reports moments of passive SI in the past but states that he wants to live and wants his vision to improve. Denies having any active SI/intent/plan in the past. Denies any HI.

## 2024-01-13 NOTE — ED BEHAVIORAL HEALTH ASSESSMENT NOTE - OTHER
chronic paranoia, but on medications to treat this not responding to internal stimuli paranoid tangents at times

## 2024-01-13 NOTE — ED PROVIDER NOTE - OBJECTIVE STATEMENT
73-year-old male hx of paranoid schizophrenia, HTN, sent in from facility for AMS. Feels generalized weakness. No pain, SOB, or any other symptoms. States he has been hearing voices, heads sticking out of walls, started seeing himself in other people this morning, has been ongoing for the past few months. His psychiatrist Dr. Rivera. Denies current SI or HI. 73-year-old male, coming from [facility], hx of paranoid schizophrenia (on Abilify and thorazine), HTN, sent in from facility for AMS. Feels generalized weakness. No pain, SOB, or any other symptoms. States he has been hearing voices, heads sticking out of walls, started seeing himself in other people this morning, has been ongoing for the past few months. His psychiatrist Dr. Rivera. Denies current SI or HI. 73-year-old male, coming from [facility], hx of paranoid schizophrenia (on Abilify and thorazine), HTN, sent in from facility for AMS. Feels generalized weakness. No pain, SOB, or any other symptoms. States he has been hearing voices, heads sticking out of walls, started seeing himself in other people this morning, has been ongoing for the past few months. His psychiatrist Dr. Rivera. Denies current SI or HI.

## 2024-01-13 NOTE — ED PROVIDER NOTE - NSFOLLOWUPCLINICS_GEN_ALL_ED_FT
[facility]  Internal Medicine  [address] [place] [address]  Phone: [phone]  Fax: [phone]  Follow Up Time: 1-3 Days    [facility]  Neurology  [address] [place] [address]  Phone: [phone]  Fax: [phone]  Follow Up Time: 4-6 Days    [facility]  Ophthalmology  [address], [address]st [place] [address]  Phone: [phone]  Fax:   Follow Up Time: 4-6 Days    [facility]  Ophthalmology  [address] [place] [address]  Phone: [phone]  Fax:   Follow Up Time: 4-6 Days    [facility]  Ophthalmology  [address]  [place] [address]  Phone: [phone]  Fax:   Follow Up Time: 4-6 Days    [facility]  Psychiatry  [address] [place] [address]  Phone: [phone]  Fax:   Follow Up Time: 4-6 Days     [facility]  Internal Medicine  [address] [place] [address]  Phone: [phone]  Fax: [phone]  Follow Up Time: 1-3 Days    [facility]  Neurology  [address] [place] [address]  Phone: [phone]  Fax: [phone]  Follow Up Time: 4-6 Days    [facility] - Ophthalmology [facility]  Ophthalmology  [address] [place] [address]  Phone: [phone]  Fax:   Follow Up Time: 4-6 Days    [facility]  Ophthalmology  [address] [place] [address]  Phone: [phone]  Fax:   Follow Up Time: 4-6 Days    [facility]  Ophthalmology  [address]  [place] [address]  Phone: [phone]  Fax:   Follow Up Time: 4-6 Days    [facility]  Psychiatry  [address], [place] [address]  Phone: [phone]  Fax:   Follow Up Time: 4-6 Days

## 2024-01-13 NOTE — ED ADULT NURSE NOTE - ED STAT RN HANDOFF DETAILS 2
Patient received alert and oriented x3. Roam alert applied. No sign of acute distress noted. Safety precaution maintained.

## 2024-01-13 NOTE — ED BEHAVIORAL HEALTH ASSESSMENT NOTE - DETAILS
No acute safety risk psych cleared for discharge back to group home reports passive SI in the past. Denies any active SI/plan/intent. Denies SI currently discussed done

## 2024-01-13 NOTE — ED BEHAVIORAL HEALTH NOTE - BEHAVIORAL HEALTH NOTE
Patient is a 74 yo male, domiciled at [facility], with hx of schizophrenia, presenting to ED for AMS. Nurse Franny ([phone]) at [facility] reports that she was not on shift when patient was sent and does not have any details. She reports that patient sees psychiatrist Dr. Rivera monthly. She states that patient does not have family involved in his care. She states that psych medications that patient are prescribed are Abilify 10mg daily and Thorazine 50mg 3x daily, and per charts he is complaint with these medications. Patient is a 72 yo male, domiciled at [facility], with hx of schizophrenia, presenting to ED for AMS. Nurse Franny ([phone]) at [facility] reports that she was not on shift when patient was sent and does not have any details. She reports that patient sees psychiatrist Dr. Rivera monthly. She states that patient does not have family involved in his care. She states that psych medications that patient are prescribed are Abilify 10mg daily and Thorazine 50mg 3x daily, and per charts he is complaint with these medications. Patient is a 74 yo male, domiciled at [facility], with hx of schizophrenia, presenting to ED for AMS. Nurse Franny ([phone]) at [facility] reports that she was not on shift when patient was sent and does not have any details. She reports that patient sees psychiatrist Dr. Rivera monthly. She states that patient does not have family involved in his care. She states that psych medications that patient are prescribed are Abilify 10mg daily and Thorazine 50mg 3x daily, and per charts he is complaint with these medications.    Collateral (Bernabe Kimble) has requested that the information below provided remain confidential: Yes [ x ] No [  ]     ========================?   COLLATERAL?   ========================?     NAME:?Bernabe Kimble   NUMBER:?[phone]   RELATIONSHIP:?Brother/Proxy   RELIABILITY:?Reliable     ========================?   HPI?   ========================?   BASELINE FUNCTIONING:?Collateral reports patient lives at [facility] for the last 20 years. He reports that at baseline he is calm, hears voices, and has mild paranoia, usually related to someone at the nursing home doing something or conspiring against him.    DATE HPI STARTED:?Last night after presenting to ED for AMS from nursing home   DECOMPENSATION:?Collateral reports that he last spoke with patient last week on the phone, and did not observe or hear anything abnormal or unusual. He states patient did not disclose any AH/VH, SI/HI on the call or paranoid thoughts.    SUICIDALITY: Collateral denies.?   VIOLENCE:?Collateral denies.   SUBSTANCE: Collateral denies.     ========================?   PAST PSYCHIATRIC HISTORY?   ========================?     DATE PAST PSYCHIATRIC HISTORY STARTED:?Over 20 years ago   MAIN PSYCHIATRIC DIAGNOSIS:?Schizophrenia   PSYCHIATRIC HOSPITALIZATIONS:?Most recently at [facility] 6 months ago   PRIOR ILLNESS:?Paranoia, auditory and visual hallucinations   SUICIDALITY:?Collateral reports suicide attempt 20 years ago, no suicidality since then that he is aware of.   VIOLENCE:?No hx of violence or aggression. Patient is a 74 yo male, domiciled at [facility], with hx of schizophrenia, presenting to ED for AMS. Nurse Franny ([phone]) at [facility] reports that she was not on shift when patient was sent and does not have any details. She reports that patient sees psychiatrist Dr. Rivera monthly. She states that patient does not have family involved in his care. She states that psych medications that patient are prescribed are Abilify 10mg daily and Thorazine 50mg 3x daily, and per charts he is complaint with these medications.    Collateral (Bernabe Kimble) has requested that the information below provided remain confidential: Yes [ x ] No [  ]     ========================?   COLLATERAL?   ========================?     NAME:?Bernabe Kimble   NUMBER:?[phone]   RELATIONSHIP:?Brother/Proxy   RELIABILITY:?Reliable     ========================?   HPI?   ========================?   BASELINE FUNCTIONING:?Collateral reports patient lives at [facility] for the last 20 years. He reports that at baseline he is calm, hears voices, and has mild paranoia, usually related to someone at the nursing home doing something or conspiring against him.    DATE HPI STARTED:?Last night after presenting to ED for AMS from nursing home   DECOMPENSATION:?Collateral reports that he last spoke with patient last week on the phone, and did not observe or hear anything abnormal or unusual. He states patient did not disclose any AH/VH, SI/HI on the call or paranoid thoughts.    SUICIDALITY: Collateral denies.?   VIOLENCE:?Collateral denies.   SUBSTANCE: Collateral denies.     ========================?   PAST PSYCHIATRIC HISTORY?   ========================?     DATE PAST PSYCHIATRIC HISTORY STARTED:?Over 20 years ago   MAIN PSYCHIATRIC DIAGNOSIS:?Schizophrenia   PSYCHIATRIC HOSPITALIZATIONS:?Most recently at [facility] 6 months ago   PRIOR ILLNESS:?Paranoia, auditory and visual hallucinations   SUICIDALITY:?Collateral reports suicide attempt 20 years ago, no suicidality since then that he is aware of.   VIOLENCE:?No hx of violence or aggression.

## 2024-01-14 LAB
CULTURE RESULTS: SIGNIFICANT CHANGE UP
CULTURE RESULTS: SIGNIFICANT CHANGE UP
SPECIMEN SOURCE: SIGNIFICANT CHANGE UP
SPECIMEN SOURCE: SIGNIFICANT CHANGE UP

## 2025-04-15 ENCOUNTER — INPATIENT (INPATIENT)
Facility: HOSPITAL | Age: 75
LOS: 6 days | Discharge: NURSING, REHAB OR LONG-TERM CARE | DRG: 390 | End: 2025-04-22
Attending: INTERNAL MEDICINE | Admitting: INTERNAL MEDICINE
Payer: MEDICARE

## 2025-04-15 VITALS
TEMPERATURE: 98 F | RESPIRATION RATE: 16 BRPM | HEART RATE: 85 BPM | SYSTOLIC BLOOD PRESSURE: 141 MMHG | OXYGEN SATURATION: 97 % | DIASTOLIC BLOOD PRESSURE: 95 MMHG

## 2025-04-15 DIAGNOSIS — Z95.810 PRESENCE OF AUTOMATIC (IMPLANTABLE) CARDIAC DEFIBRILLATOR: Chronic | ICD-10-CM

## 2025-04-15 PROCEDURE — 99291 CRITICAL CARE FIRST HOUR: CPT

## 2025-04-15 RX ORDER — SODIUM CHLORIDE 9 G/1000ML
1000 INJECTION, SOLUTION INTRAVENOUS ONCE
Refills: 0 | Status: COMPLETED | OUTPATIENT
Start: 2025-04-15 | End: 2025-04-15

## 2025-04-15 RX ORDER — METOCLOPRAMIDE HCL 10 MG
10 TABLET ORAL ONCE
Refills: 0 | Status: COMPLETED | OUTPATIENT
Start: 2025-04-15 | End: 2025-04-15

## 2025-04-15 RX ORDER — ONDANSETRON HCL/PF 4 MG/2 ML
1 VIAL (ML) INJECTION
Qty: 1 | Refills: 0
Start: 2025-04-15 | End: 2025-04-19

## 2025-04-16 DIAGNOSIS — E78.5 HYPERLIPIDEMIA, UNSPECIFIED: ICD-10-CM

## 2025-04-16 DIAGNOSIS — K56.609 UNSPECIFIED INTESTINAL OBSTRUCTION, UNSPECIFIED AS TO PARTIAL VERSUS COMPLETE OBSTRUCTION: ICD-10-CM

## 2025-04-16 DIAGNOSIS — R93.89 ABNORMAL FINDINGS ON DIAGNOSTIC IMAGING OF OTHER SPECIFIED BODY STRUCTURES: ICD-10-CM

## 2025-04-16 DIAGNOSIS — R65.10 SYSTEMIC INFLAMMATORY RESPONSE SYNDROME (SIRS) OF NON-INFECTIOUS ORIGIN WITHOUT ACUTE ORGAN DYSFUNCTION: ICD-10-CM

## 2025-04-16 DIAGNOSIS — I10 ESSENTIAL (PRIMARY) HYPERTENSION: ICD-10-CM

## 2025-04-16 DIAGNOSIS — F20.9 SCHIZOPHRENIA, UNSPECIFIED: ICD-10-CM

## 2025-04-16 DIAGNOSIS — Z29.9 ENCOUNTER FOR PROPHYLACTIC MEASURES, UNSPECIFIED: ICD-10-CM

## 2025-04-16 DIAGNOSIS — R10.84 GENERALIZED ABDOMINAL PAIN: ICD-10-CM

## 2025-04-16 DIAGNOSIS — Z95.810 PRESENCE OF AUTOMATIC (IMPLANTABLE) CARDIAC DEFIBRILLATOR: ICD-10-CM

## 2025-04-16 DIAGNOSIS — E11.9 TYPE 2 DIABETES MELLITUS WITHOUT COMPLICATIONS: ICD-10-CM

## 2025-04-16 LAB
ALBUMIN SERPL ELPH-MCNC: 3.8 G/DL — SIGNIFICANT CHANGE UP (ref 3.5–5)
ALP SERPL-CCNC: 70 U/L — SIGNIFICANT CHANGE UP (ref 40–120)
ALT FLD-CCNC: 36 U/L DA — SIGNIFICANT CHANGE UP (ref 10–60)
ANION GAP SERPL CALC-SCNC: 9 MMOL/L — SIGNIFICANT CHANGE UP (ref 5–17)
ANISOCYTOSIS BLD QL: SLIGHT — SIGNIFICANT CHANGE UP
APPEARANCE UR: CLEAR — SIGNIFICANT CHANGE UP
APTT BLD: 29 SEC — SIGNIFICANT CHANGE UP (ref 24.5–35.6)
APTT BLD: 30.2 SEC — SIGNIFICANT CHANGE UP (ref 24.5–35.6)
AST SERPL-CCNC: 37 U/L — SIGNIFICANT CHANGE UP (ref 10–40)
BACTERIA # UR AUTO: ABNORMAL /HPF
BASOPHILS # BLD AUTO: 0.01 K/UL — SIGNIFICANT CHANGE UP (ref 0–0.2)
BASOPHILS # BLD AUTO: 0.02 K/UL — SIGNIFICANT CHANGE UP (ref 0–0.2)
BASOPHILS NFR BLD AUTO: 0.1 % — SIGNIFICANT CHANGE UP (ref 0–2)
BASOPHILS NFR BLD AUTO: 0.1 % — SIGNIFICANT CHANGE UP (ref 0–2)
BILIRUB SERPL-MCNC: 0.6 MG/DL — SIGNIFICANT CHANGE UP (ref 0.2–1.2)
BILIRUB UR-MCNC: NEGATIVE — SIGNIFICANT CHANGE UP
BLD GP AB SCN SERPL QL: SIGNIFICANT CHANGE UP
BUN SERPL-MCNC: 18 MG/DL — SIGNIFICANT CHANGE UP (ref 7–18)
CALCIUM SERPL-MCNC: 8.9 MG/DL — SIGNIFICANT CHANGE UP (ref 8.4–10.5)
CHLORIDE SERPL-SCNC: 104 MMOL/L — SIGNIFICANT CHANGE UP (ref 96–108)
CO2 SERPL-SCNC: 22 MMOL/L — SIGNIFICANT CHANGE UP (ref 22–31)
COLOR SPEC: YELLOW — SIGNIFICANT CHANGE UP
CREAT SERPL-MCNC: 1.16 MG/DL — SIGNIFICANT CHANGE UP (ref 0.5–1.3)
DIFF PNL FLD: NEGATIVE — SIGNIFICANT CHANGE UP
EGFR: 66 ML/MIN/1.73M2 — SIGNIFICANT CHANGE UP
EGFR: 66 ML/MIN/1.73M2 — SIGNIFICANT CHANGE UP
EOSINOPHIL # BLD AUTO: 0 K/UL — SIGNIFICANT CHANGE UP (ref 0–0.5)
EOSINOPHIL # BLD AUTO: 0.01 K/UL — SIGNIFICANT CHANGE UP (ref 0–0.5)
EOSINOPHIL NFR BLD AUTO: 0 % — SIGNIFICANT CHANGE UP (ref 0–6)
EOSINOPHIL NFR BLD AUTO: 0.1 % — SIGNIFICANT CHANGE UP (ref 0–6)
EPI CELLS # UR: SIGNIFICANT CHANGE UP
GLUCOSE BLDC GLUCOMTR-MCNC: 127 MG/DL — HIGH (ref 70–99)
GLUCOSE SERPL-MCNC: 155 MG/DL — HIGH (ref 70–99)
GLUCOSE UR QL: NEGATIVE MG/DL — SIGNIFICANT CHANGE UP
GRAN CASTS # UR COMP ASSIST: PRESENT
HCT VFR BLD CALC: 42.6 % — SIGNIFICANT CHANGE UP (ref 39–50)
HCT VFR BLD CALC: 45.1 % — SIGNIFICANT CHANGE UP (ref 39–50)
HCV AB S/CO SERPL IA: 0.11 S/CO — SIGNIFICANT CHANGE UP (ref 0–0.79)
HCV AB SERPL-IMP: SIGNIFICANT CHANGE UP
HGB BLD-MCNC: 13.3 G/DL — SIGNIFICANT CHANGE UP (ref 13–17)
HGB BLD-MCNC: 14 G/DL — SIGNIFICANT CHANGE UP (ref 13–17)
HIV 1 & 2 AB SERPL IA.RAPID: SIGNIFICANT CHANGE UP
IMM GRANULOCYTES NFR BLD AUTO: 0.5 % — SIGNIFICANT CHANGE UP (ref 0–0.9)
IMM GRANULOCYTES NFR BLD AUTO: 0.5 % — SIGNIFICANT CHANGE UP (ref 0–0.9)
INR BLD: 1.03 RATIO — SIGNIFICANT CHANGE UP (ref 0.85–1.16)
INR BLD: 1.06 RATIO — SIGNIFICANT CHANGE UP (ref 0.85–1.16)
KETONES UR-MCNC: ABNORMAL MG/DL
LEUKOCYTE ESTERASE UR-ACNC: NEGATIVE — SIGNIFICANT CHANGE UP
LYMPHOCYTES # BLD AUTO: 0.46 K/UL — LOW (ref 1–3.3)
LYMPHOCYTES # BLD AUTO: 0.46 K/UL — LOW (ref 1–3.3)
LYMPHOCYTES # BLD AUTO: 2.8 % — LOW (ref 13–44)
LYMPHOCYTES # BLD AUTO: 3.7 % — LOW (ref 13–44)
MANUAL SMEAR VERIFICATION: SIGNIFICANT CHANGE UP
MCHC RBC-ENTMCNC: 19.8 PG — LOW (ref 27–34)
MCHC RBC-ENTMCNC: 20.3 PG — LOW (ref 27–34)
MCHC RBC-ENTMCNC: 31 G/DL — LOW (ref 32–36)
MCHC RBC-ENTMCNC: 31.2 G/DL — LOW (ref 32–36)
MCV RBC AUTO: 63.3 FL — LOW (ref 80–100)
MCV RBC AUTO: 65.4 FL — LOW (ref 80–100)
MONOCYTES # BLD AUTO: 0.45 K/UL — SIGNIFICANT CHANGE UP (ref 0–0.9)
MONOCYTES # BLD AUTO: 0.89 K/UL — SIGNIFICANT CHANGE UP (ref 0–0.9)
MONOCYTES NFR BLD AUTO: 3.6 % — SIGNIFICANT CHANGE UP (ref 2–14)
MONOCYTES NFR BLD AUTO: 5.4 % — SIGNIFICANT CHANGE UP (ref 2–14)
NEUTROPHILS # BLD AUTO: 11.34 K/UL — HIGH (ref 1.8–7.4)
NEUTROPHILS # BLD AUTO: 15.15 K/UL — HIGH (ref 1.8–7.4)
NEUTROPHILS NFR BLD AUTO: 91.2 % — HIGH (ref 43–77)
NEUTROPHILS NFR BLD AUTO: 92 % — HIGH (ref 43–77)
NITRITE UR-MCNC: NEGATIVE — SIGNIFICANT CHANGE UP
NRBC BLD AUTO-RTO: 0 /100 WBCS — SIGNIFICANT CHANGE UP (ref 0–0)
NRBC BLD AUTO-RTO: 0 /100 WBCS — SIGNIFICANT CHANGE UP (ref 0–0)
OVALOCYTES BLD QL SMEAR: SLIGHT — SIGNIFICANT CHANGE UP
PH UR: 5.5 — SIGNIFICANT CHANGE UP (ref 5–8)
PLAT MORPH BLD: NORMAL — SIGNIFICANT CHANGE UP
PLATELET # BLD AUTO: 463 K/UL — HIGH (ref 150–400)
PLATELET # BLD AUTO: 475 K/UL — HIGH (ref 150–400)
PLATELET COUNT - ESTIMATE: NORMAL — SIGNIFICANT CHANGE UP
POLYCHROMASIA BLD QL SMEAR: SLIGHT — SIGNIFICANT CHANGE UP
POTASSIUM SERPL-MCNC: 4.7 MMOL/L — SIGNIFICANT CHANGE UP (ref 3.5–5.3)
POTASSIUM SERPL-SCNC: 4.7 MMOL/L — SIGNIFICANT CHANGE UP (ref 3.5–5.3)
PROT SERPL-MCNC: 7.5 G/DL — SIGNIFICANT CHANGE UP (ref 6–8.3)
PROT UR-MCNC: 30 MG/DL
PROTHROM AB SERPL-ACNC: 11.9 SEC — SIGNIFICANT CHANGE UP (ref 9.9–13.4)
PROTHROM AB SERPL-ACNC: 12.3 SEC — SIGNIFICANT CHANGE UP (ref 9.9–13.4)
RBC # BLD: 6.73 M/UL — HIGH (ref 4.2–5.8)
RBC # BLD: 6.9 M/UL — HIGH (ref 4.2–5.8)
RBC # FLD: 18.2 % — HIGH (ref 10.3–14.5)
RBC # FLD: 18.2 % — HIGH (ref 10.3–14.5)
RBC BLD AUTO: ABNORMAL
RBC CASTS # UR COMP ASSIST: 2 /HPF — SIGNIFICANT CHANGE UP (ref 0–4)
SODIUM SERPL-SCNC: 135 MMOL/L — SIGNIFICANT CHANGE UP (ref 135–145)
SP GR SPEC: 1.08 — HIGH (ref 1–1.03)
UROBILINOGEN FLD QL: 0.2 MG/DL — SIGNIFICANT CHANGE UP (ref 0.2–1)
WBC # BLD: 12.33 K/UL — HIGH (ref 3.8–10.5)
WBC # BLD: 16.61 K/UL — HIGH (ref 3.8–10.5)
WBC # FLD AUTO: 12.33 K/UL — HIGH (ref 3.8–10.5)
WBC # FLD AUTO: 16.61 K/UL — HIGH (ref 3.8–10.5)
WBC UR QL: 2 /HPF — SIGNIFICANT CHANGE UP (ref 0–5)

## 2025-04-16 PROCEDURE — 74177 CT ABD & PELVIS W/CONTRAST: CPT | Mod: 26

## 2025-04-16 PROCEDURE — 99222 1ST HOSP IP/OBS MODERATE 55: CPT | Mod: FS

## 2025-04-16 PROCEDURE — 99223 1ST HOSP IP/OBS HIGH 75: CPT | Mod: FS

## 2025-04-16 RX ORDER — ATORVASTATIN CALCIUM 80 MG/1
10 TABLET, FILM COATED ORAL AT BEDTIME
Refills: 0 | Status: DISCONTINUED | OUTPATIENT
Start: 2025-04-16 | End: 2025-04-22

## 2025-04-16 RX ORDER — ONDANSETRON HCL/PF 4 MG/2 ML
4 VIAL (ML) INJECTION EVERY 8 HOURS
Refills: 0 | Status: DISCONTINUED | OUTPATIENT
Start: 2025-04-16 | End: 2025-04-22

## 2025-04-16 RX ORDER — INFLUENZA A VIRUS A/IDAHO/07/2018 (H1N1) ANTIGEN (MDCK CELL DERIVED, PROPIOLACTONE INACTIVATED, INFLUENZA A VIRUS A/INDIANA/08/2018 (H3N2) ANTIGEN (MDCK CELL DERIVED, PROPIOLACTONE INACTIVATED), INFLUENZA B VIRUS B/SINGAPORE/INFTT-16-0610/2016 ANTIGEN (MDCK CELL DERIVED, PROPIOLACTONE INACTIVATED), INFLUENZA B VIRUS B/IOWA/06/2017 ANTIGEN (MDCK CELL DERIVED, PROPIOLACTONE INACTIVATED) 15; 15; 15; 15 UG/.5ML; UG/.5ML; UG/.5ML; UG/.5ML
0.5 INJECTION, SUSPENSION INTRAMUSCULAR ONCE
Refills: 0 | Status: DISCONTINUED | OUTPATIENT
Start: 2025-04-16 | End: 2025-04-22

## 2025-04-16 RX ORDER — FERROUS SULFATE 137(45) MG
325 TABLET, EXTENDED RELEASE ORAL
Refills: 0 | DISCHARGE

## 2025-04-16 RX ORDER — ATORVASTATIN CALCIUM 80 MG/1
1 TABLET, FILM COATED ORAL
Refills: 0 | DISCHARGE

## 2025-04-16 RX ORDER — LOSARTAN POTASSIUM 100 MG/1
1 TABLET, FILM COATED ORAL
Refills: 0 | DISCHARGE

## 2025-04-16 RX ORDER — SODIUM CHLORIDE 9 G/1000ML
1000 INJECTION, SOLUTION INTRAVENOUS
Refills: 0 | Status: DISCONTINUED | OUTPATIENT
Start: 2025-04-16 | End: 2025-04-19

## 2025-04-16 RX ORDER — ARIPIPRAZOLE 2 MG/1
15 TABLET ORAL DAILY
Refills: 0 | Status: DISCONTINUED | OUTPATIENT
Start: 2025-04-16 | End: 2025-04-22

## 2025-04-16 RX ORDER — ARIPIPRAZOLE 2 MG/1
1 TABLET ORAL
Refills: 0 | DISCHARGE

## 2025-04-16 RX ORDER — LOSARTAN POTASSIUM 100 MG/1
50 TABLET, FILM COATED ORAL DAILY
Refills: 0 | Status: DISCONTINUED | OUTPATIENT
Start: 2025-04-16 | End: 2025-04-22

## 2025-04-16 RX ORDER — FERROUS SULFATE 137(45) MG
325 TABLET, EXTENDED RELEASE ORAL DAILY
Refills: 0 | Status: DISCONTINUED | OUTPATIENT
Start: 2025-04-16 | End: 2025-04-22

## 2025-04-16 RX ORDER — METOPROLOL SUCCINATE 50 MG/1
1 TABLET, EXTENDED RELEASE ORAL
Refills: 0 | DISCHARGE

## 2025-04-16 RX ORDER — CHLORPROMAZINE HCL 10 MG
50 TABLET ORAL
Refills: 0 | Status: DISCONTINUED | OUTPATIENT
Start: 2025-04-16 | End: 2025-04-22

## 2025-04-16 RX ORDER — CHLORPROMAZINE HCL 10 MG
1 TABLET ORAL
Refills: 0 | DISCHARGE

## 2025-04-16 RX ORDER — METOPROLOL SUCCINATE 50 MG/1
50 TABLET, EXTENDED RELEASE ORAL
Refills: 0 | Status: DISCONTINUED | OUTPATIENT
Start: 2025-04-16 | End: 2025-04-22

## 2025-04-16 RX ORDER — CLOPIDOGREL BISULFATE 75 MG/1
75 TABLET, FILM COATED ORAL DAILY
Refills: 0 | Status: DISCONTINUED | OUTPATIENT
Start: 2025-04-16 | End: 2025-04-22

## 2025-04-16 RX ORDER — CLOPIDOGREL BISULFATE 75 MG/1
1 TABLET, FILM COATED ORAL
Refills: 0 | DISCHARGE

## 2025-04-16 RX ADMIN — SODIUM CHLORIDE 1000 MILLILITER(S): 9 INJECTION, SOLUTION INTRAVENOUS at 00:48

## 2025-04-16 RX ADMIN — ATORVASTATIN CALCIUM 10 MILLIGRAM(S): 80 TABLET, FILM COATED ORAL at 21:37

## 2025-04-16 RX ADMIN — METOPROLOL SUCCINATE 50 MILLIGRAM(S): 50 TABLET, EXTENDED RELEASE ORAL at 17:36

## 2025-04-16 RX ADMIN — Medication 50 MILLIGRAM(S): at 17:36

## 2025-04-16 RX ADMIN — Medication 10 MILLIGRAM(S): at 00:47

## 2025-04-16 RX ADMIN — Medication 80 MILLIGRAM(S): at 03:47

## 2025-04-16 RX ADMIN — SODIUM CHLORIDE 75 MILLILITER(S): 9 INJECTION, SOLUTION INTRAVENOUS at 10:11

## 2025-04-16 NOTE — PROGRESS NOTE ADULT - SUBJECTIVE AND OBJECTIVE BOX
INTERVAL HPI/OVERNIGHT EVENTS:  Pt resting comfortably. Denies abd pain.  States vomited twice since arrival in [facility].  +loose BMs.    MEDICATIONS  (STANDING):  ARIPiprazole 15 milliGRAM(s) Oral daily  atorvastatin 10 milliGRAM(s) Oral at bedtime  chlorproMAZINE    Tablet 50 milliGRAM(s) Oral four times a day  ferrous    sulfate 325 milliGRAM(s) Oral daily  lactated ringers. 1000 milliLiter(s) (75 mL/Hr) IV Continuous <Continuous>  losartan 50 milliGRAM(s) Oral daily  metoprolol tartrate 50 milliGRAM(s) Oral two times a day    MEDICATIONS  (PRN):  ondansetron Injectable 4 milliGRAM(s) IV Push every 8 hours PRN Nausea and/or Vomiting    Vital Signs Last 24 Hrs  T(C): 36.7 (16 Apr 2025 07:27), Max: 37 (16 Apr 2025 03:51)  T(F): 98 (16 Apr 2025 07:27), Max: 98.6 (16 Apr 2025 03:51)  HR: 121 (16 Apr 2025 07:27) (85 - 121)  BP: 119/84 (16 Apr 2025 07:27) (119/84 - 157/95)  BP(mean): --  RR: 21 (16 Apr 2025 07:27) (16 - 21)  SpO2: 94% (16 Apr 2025 07:27) (94% - 97%)    Parameters below as of 16 Apr 2025 07:27  Patient On (Oxygen Delivery Method): room air    Physical:  General: A&Ox3. NAD. Obese.  Abdomen: Soft nondistended, nontender.    LABS:                        13.3   16.61 )-----------( 463      ( 16 Apr 2025 06:37 )             42.6             04-16    135  |  104  |  18  ----------------------------<  155[H]  4.7   |  22  |  1.16    Ca    8.9      16 Apr 2025 00:38    TPro  7.5  /  Alb  3.8  /  TBili  0.6  /  DBili  x   /  AST  37  /  ALT  36  /  AlkPhos  70  04-16

## 2025-04-16 NOTE — ED ADULT NURSE NOTE - NSFALLUNIVINTERV_ED_ALL_ED
Bed/Stretcher in lowest position, wheels locked, appropriate side rails in place/Call bell, personal items and telephone in reach/Instruct patient to call for assistance before getting out of bed/chair/stretcher/Non-slip footwear applied when patient is off stretcher/[place] to call system/Physically safe environment - no spills, clutter or unnecessary equipment/Purposeful proactive rounding/Room/bathroom lighting operational, light cord in reach

## 2025-04-16 NOTE — ED ADULT NURSE REASSESSMENT NOTE - NS ED NURSE REASSESS COMMENT FT1
2 am Pt  vomited black fluid large amount . Dr FRANCISCO aware
7 am Reported off to day shift Rn . No distress , pt resting
Patient alert and verbally responsive breathing in room air, denies any pain at  present time awaiting for surgical consult. safety maintained

## 2025-04-16 NOTE — ED PROVIDER NOTE - OBJECTIVE STATEMENT
75-year-old male [facility] resident with schizophrenia hypertension high cholesterol and diabetes and he presents initially with vomiting and diarrhea assumed to be evaluated gastroenteritis however while he was here he had 75-year-old male [place] [facility] resident with schizophrenia hypertension high cholesterol and diabetes and he presents initially with vomiting and diarrhea assumed to be evaluated gastroenteritis however while he was here he had large episode of vomiting coffee ground emesis.

## 2025-04-16 NOTE — H&P ADULT - HISTORY OF PRESENT ILLNESS
75M from [facility], ambulates with a cane, w/PMH of schizophrenia, HTN, HLD, and DM presents w/ 75M from [facility], ambulates with a cane, w/PMH of schizophrenia, HTN, HLD, AICD, and DM presents w/1 day hx of vomiting and diarrhea. Pt says he was eating dinner last night (raveoli w/tomato sauce) and shortly after started NBNB vomiting and having multiple bouts of diarrhea (watery). In the ED, pt had an episode of large coffee ground emesis. Denies abdominal pain. No other complaints. Denies headache, cough, chest pain, shortness of breath, dysuria, hematuria, or hematochezia.     ED Course:  Vitals T97.6, HR 85, /95, RR 16, SpO2 97% on RA.  Labs s/f WBC 16.61  CT a/p: Mildly dilated fluid-filled loops of small bowel measuring up to 3.3 cm in diameter with a transition point in the mid abdomen (2:84) consistent with at least partial obstruction. No bowel wall thickening or pneumatosis.  Surgery consulted: however pt refused NGT and thus they felt he would be better suited for medicine.     75M from [facility], ambulates with a cane, w/PMH of schizophrenia, HTN, HLD, AICD, and DM presents w/1 day hx of vomiting and diarrhea. Pt says he was eating dinner last night (raveoli w/tomato sauce) and shortly after started NBNB vomiting and having multiple bouts of diarrhea (watery). In the ED, pt had an episode of large coffee ground emesis. Denies abdominal pain. No other complaints. Denies headache, cough, chest pain, shortness of breath, dysuria, hematuria, or hematochezia.     ED Course:  Vitals T97.6, HR 85, /95, RR 16, SpO2 97% on RA.  Labs s/f WBC 16.61  CT a/p: Mildly dilated fluid-filled loops of small bowel measuring up to 3.3 cm in diameter with a transition point in the mid abdomen (2:84) consistent with at least partial obstruction. No bowel wall thickening or pneumatosis.  Surgery consulted: pt refused NGT and thus they felt he would be better suited for medicine.   s/p Reglan, PPI, and 2L LR in ED.

## 2025-04-16 NOTE — H&P ADULT - ASSESSMENT
75M from [facility], ambulates with a cane, w/PMH of schizophrenia, HTN, HLD, AICD, and DM presents w/1 day hx of vomiting and diarrhea. Found to have large episode of coffee ground emesis in ED. CT a/p: partial SBO. Admitted to medicine for partial SBO and coffee ground emesis r/o GIB.

## 2025-04-16 NOTE — PROGRESS NOTE ADULT - ASSESSMENT
Hpi Title: Evaluation of Skin Lesions 75y.o. Male with partial SBO, coffee ground emesis likely secondary to enteritis    -Keep NPO  -IVF  -Supportive care  -PPI  -Stool culture  -GI consult  -OOB ambulate  -Pain control prn  -DVT ppx  -Incentive spirometry   -Will follow How Severe Are Your Spot(S)?: mild Have Your Spot(S) Been Treated In The Past?: has not been treated

## 2025-04-16 NOTE — CONSULT NOTE ADULT - SUBJECTIVE AND OBJECTIVE BOX
INITIAL GI CONSULTATION    Patient is a 75y old  Male who presents with a chief complaint of vomiting, diarrhea (16 Apr 2025 09:07)    HPI:  75M from [facility], ambulates with a cane, w/PMH of schizophrenia, HTN, HLD, AICD, and DM presents w/1 day hx of vomiting and diarrhea. Pt says he was eating dinner last night (raveoli w/tomato sauce) and shortly after started NBNB vomiting and having multiple bouts of diarrhea (watery). In the ED, pt had an episode of large coffee ground emesis. Denies abdominal pain. No other complaints. Denies headache, cough, chest pain, shortness of breath, dysuria, hematuria, or hematochezia.     GI HPI: Patient seen and examined on unit. Resting in bed. Patient endorse coming into the ED for worsening N/V/D starting yesterday. Patient states last episode of vomiting was in the ED. Patient denies abdominal pain or bloating. Denies loss of appetite or weight loss.     ED Course:  Vitals T97.6, HR 85, /95, RR 16, SpO2 97% on RA.  Labs s/f WBC 16.61  CT a/p: Mildly dilated fluid-filled loops of small bowel measuring up to 3.3 cm in diameter with a transition point in the mid abdomen (2:84) consistent with at least partial obstruction. No bowel wall thickening or pneumatosis.  Surgery consulted: pt refused NGT and thus they felt he would be better suited for medicine.   s/p Reglan, PPI, and 2L LR in ED.     (16 Apr 2025 09:07)        PMH/PSH:  PAST MEDICAL & SURGICAL HISTORY:  HTN      Schizophrenia      CHF (congestive heart failure)      HLD (hyperlipidemia)      Vertigo      GERD (gastroesophageal reflux disease)      Hydrocele  Hydrocele removal 2007      S/P PTCA (Percutaneous Transluminal Coronary Angioplasty)      AICD (automatic cardioverter/defibrillator) present            FH:  FAMILY HISTORY:  No pertinent family history in first degree relatives          MEDS:  MEDICATIONS  (STANDING):  ARIPiprazole 15 milliGRAM(s) Oral daily  atorvastatin 10 milliGRAM(s) Oral at bedtime  chlorproMAZINE    Tablet 50 milliGRAM(s) Oral four times a day  clopidogrel Tablet 75 milliGRAM(s) Oral daily  ferrous    sulfate 325 milliGRAM(s) Oral daily  lactated ringers. 1000 milliLiter(s) (75 mL/Hr) IV Continuous <Continuous>  losartan 50 milliGRAM(s) Oral daily  metoprolol tartrate 50 milliGRAM(s) Oral two times a day    MEDICATIONS  (PRN):  ondansetron Injectable 4 milliGRAM(s) IV Push every 8 hours PRN Nausea and/or Vomiting    Allergies    No Known Allergies    Intolerances            ______________________________________________________________________  PHYSICAL EXAM:  T(C): 36.3 (04-16-25 @ 11:45), Max: 37 (04-16-25 @ 03:51)  HR: 106 (04-16-25 @ 11:45)  BP: 149/84 (04-16-25 @ 11:45)  RR: 19 (04-16-25 @ 11:45)  SpO2: 93% (04-16-25 @ 11:45)  Wt(kg): --      GEN: NAD  PULM: clear  CV: tachycardic   GI: Soft, NT, Rounded; +BS in all four quadrants  MSK:  no edema  NEURO: A&O x 3  ______________________________________________________________________  LABS:                        13.3   16.61 )-----------( 463      ( 16 Apr 2025 06:37 )             42.6     04-16    135  |  104  |  18  ----------------------------<  155[H]  4.7   |  22  |  1.16    Ca    8.9      16 Apr 2025 00:38    TPro  7.5  /  Alb  3.8  /  TBili  0.6  /  DBili  x   /  AST  37  /  ALT  36  /  AlkPhos  70  04-16    LIVER FUNCTIONS - ( 16 Apr 2025 00:38 )  Alb: 3.8 g/dL / Pro: 7.5 g/dL / ALK PHOS: 70 U/L / ALT: 36 U/L DA / AST: 37 U/L / GGT: x           PT/INR - ( 16 Apr 2025 06:37 )   PT: 12.3 sec;   INR: 1.06 ratio         PTT - ( 16 Apr 2025 06:37 )  PTT:30.2 sec  ____________________________________________    IMAGING:    < from: CT Abdomen and Pelvis w/ IV Cont (04.16.25 @ 05:07) >  IMPRESSION:    Small hiatal hernia. Thickening of the distal esophagus question   esophagitis. This would be better evaluated with endoscopy.    Mildly dilated fluid-filled loops of small bowel measuring up to 3.3 cm   in diameter with a transition point in the mid abdomen (2:84) consistent   with at least partial obstruction. No bowel wall thickening or   pneumatosis.        This note and its recommendations herein are preliminary until such time as cosigned by an attending.   INITIAL GI CONSULTATION    Patient is a 75y old  Male who presents with a chief complaint of vomiting, diarrhea (16 Apr 2025 09:07)    HPI:  75M from [facility], ambulates with a cane, w/PMH of schizophrenia, HTN, HLD, AICD, and DM presents w/1 day hx of vomiting and diarrhea. Pt says he was eating dinner last night (raveoli w/tomato sauce) and shortly after started NBNB vomiting and having multiple bouts of diarrhea (watery). In the ED, pt had an episode of large coffee ground emesis. Denies abdominal pain. No other complaints. Denies headache, cough, chest pain, shortness of breath, dysuria, hematuria, or hematochezia.     GI HPI: Patient seen and examined on unit. Resting in bed. Patient endorse coming into the ED for worsening N/V/D starting yesterday. Patient states last episode of vomiting was in the ED. Patient does not recall the color of his emesis stating he's "color blind". Patient denies abdominal pain or bloating. Denies loss of appetite or weight loss.     ED Course:  Vitals T97.6, HR 85, /95, RR 16, SpO2 97% on RA.  Labs s/f WBC 16.61  CT a/p: Mildly dilated fluid-filled loops of small bowel measuring up to 3.3 cm in diameter with a transition point in the mid abdomen (2:84) consistent with at least partial obstruction. No bowel wall thickening or pneumatosis.  Surgery consulted: pt refused NGT and thus they felt he would be better suited for medicine.   s/p Reglan, PPI, and 2L LR in ED.     (16 Apr 2025 09:07)        PMH/PSH:  PAST MEDICAL & SURGICAL HISTORY:  HTN      Schizophrenia      CHF (congestive heart failure)      HLD (hyperlipidemia)      Vertigo      GERD (gastroesophageal reflux disease)      Hydrocele  Hydrocele removal 2007      S/P PTCA (Percutaneous Transluminal Coronary Angioplasty)      AICD (automatic cardioverter/defibrillator) present            FH:  FAMILY HISTORY:  No pertinent family history in first degree relatives          MEDS:  MEDICATIONS  (STANDING):  ARIPiprazole 15 milliGRAM(s) Oral daily  atorvastatin 10 milliGRAM(s) Oral at bedtime  chlorproMAZINE    Tablet 50 milliGRAM(s) Oral four times a day  clopidogrel Tablet 75 milliGRAM(s) Oral daily  ferrous    sulfate 325 milliGRAM(s) Oral daily  lactated ringers. 1000 milliLiter(s) (75 mL/Hr) IV Continuous <Continuous>  losartan 50 milliGRAM(s) Oral daily  metoprolol tartrate 50 milliGRAM(s) Oral two times a day    MEDICATIONS  (PRN):  ondansetron Injectable 4 milliGRAM(s) IV Push every 8 hours PRN Nausea and/or Vomiting    Allergies    No Known Allergies    Intolerances            ______________________________________________________________________  PHYSICAL EXAM:  T(C): 36.3 (04-16-25 @ 11:45), Max: 37 (04-16-25 @ 03:51)  HR: 106 (04-16-25 @ 11:45)  BP: 149/84 (04-16-25 @ 11:45)  RR: 19 (04-16-25 @ 11:45)  SpO2: 93% (04-16-25 @ 11:45)  Wt(kg): --      GEN: NAD  PULM: clear  CV: tachycardic   GI: Soft, NT, Rounded; +BS in all four quadrants  MSK:  no edema  NEURO: A&O x 3  ______________________________________________________________________  LABS:                        13.3   16.61 )-----------( 463      ( 16 Apr 2025 06:37 )             42.6     04-16    135  |  104  |  18  ----------------------------<  155[H]  4.7   |  22  |  1.16    Ca    8.9      16 Apr 2025 00:38    TPro  7.5  /  Alb  3.8  /  TBili  0.6  /  DBili  x   /  AST  37  /  ALT  36  /  AlkPhos  70  04-16    LIVER FUNCTIONS - ( 16 Apr 2025 00:38 )  Alb: 3.8 g/dL / Pro: 7.5 g/dL / ALK PHOS: 70 U/L / ALT: 36 U/L DA / AST: 37 U/L / GGT: x           PT/INR - ( 16 Apr 2025 06:37 )   PT: 12.3 sec;   INR: 1.06 ratio         PTT - ( 16 Apr 2025 06:37 )  PTT:30.2 sec  ____________________________________________    IMAGING:    < from: CT Abdomen and Pelvis w/ IV Cont (04.16.25 @ 05:07) >  IMPRESSION:    Small hiatal hernia. Thickening of the distal esophagus question   esophagitis. This would be better evaluated with endoscopy.    Mildly dilated fluid-filled loops of small bowel measuring up to 3.3 cm   in diameter with a transition point in the mid abdomen (2:84) consistent   with at least partial obstruction. No bowel wall thickening or   pneumatosis.

## 2025-04-16 NOTE — ED PROVIDER NOTE - PHYSICAL EXAMINATION
General: pale, mild distress,  distress, appears stated age  HEENT: normocephalic, atraumatic   Respiratory: normal work of breathing  MSK: no swelling or tenderness of lower extremities, moving all extremities spontaneously   Skin: warm, dry  Neuro: A&Ox3, cranial nerves II-XII intact, 5/5 strength in all extremities, no sensory deficits, normal gait   Psych: appropriate affect  ABD: soft, nontender, distended, no guarding, no rebound, no CVA tenderness

## 2025-04-16 NOTE — CONSULT NOTE ADULT - SUBJECTIVE AND OBJECTIVE BOX
HPI:  < from: CT Abdomen and Pelvis w/ IV Cont (04.16.25 @ 05:07) >  FINDINGS:  LOWER CHEST: Trace bilateral pleural fluid. Cardiac leads.    LIVER: Within normal limits.  BILE DUCTS: Normal caliber.  GALLBLADDER: Within normal limits.  SPLEEN: Within normal limits.  PANCREAS: Within normal limits.  ADRENALS: Within normal limits.  KIDNEYS/URETERS: Right renal cyst.    BLADDER: Within normal limits.  REPRODUCTIVE ORGANS: Prostate is enlarged.    BOWEL: Hiatal hernia. Thickening of the distal esophagus. Mildly dilated   fluid-filled loops of small bowel measuring up to 3.3 cm in diameter with   a transition point in the mid abdomen (2:84) consistent with at least   partial obstruction. No bowel wall thickening or pneumatosis. Appendix is   normal.  PERITONEUM/RETROPERITONEUM: Within normal limits.  VESSELS: Atherosclerotic changes.  LYMPH NODES: No lymphadenopathy.  ABDOMINAL WALL: Small fat-containing umbilical hernia. Small bilateral   fat-containing inguinal hernias.  BONES: Degenerative changes. Grade 1 retrolisthesis L5 on S1.    IMPRESSION:    Small hiatal hernia. Thickening of the distal esophagus question   esophagitis. This would be better evaluated with endoscopy.    Mildly dilated fluid-filled loops of small bowel measuring up to 3.3 cm   in diameter with a transition point in the mid abdomen (2:84) consistent   with at least partial obstruction. No bowel wall thickening or   pneumatosis.    < end of copied text >      PAST MEDICAL & SURGICAL HISTORY:  HTN      Schizophrenia      CHF (congestive heart failure)      HLD (hyperlipidemia)      Vertigo      GERD (gastroesophageal reflux disease)      Hydrocele  Hydrocele removal 2007      S/P PTCA (Percutaneous Transluminal Coronary Angioplasty)      AICD (automatic cardioverter/defibrillator) present          Vital Signs Last 24 Hrs  T(C): 37 (16 Apr 2025 03:51), Max: 37 (16 Apr 2025 03:51)  T(F): 98.6 (16 Apr 2025 03:51), Max: 98.6 (16 Apr 2025 03:51)  HR: 106 (16 Apr 2025 03:51) (85 - 106)  BP: 149/80 (16 Apr 2025 03:51) (141/95 - 157/95)  BP(mean): --  RR: 18 (16 Apr 2025 03:51) (16 - 20)  SpO2: 95% (16 Apr 2025 03:51) (95% - 97%)    Parameters below as of 16 Apr 2025 03:51  Patient On (Oxygen Delivery Method): room air                              13.3   16.61 )-----------( 463      ( 16 Apr 2025 06:37 )             42.6     04-16    135  |  104  |  18  ----------------------------<  155[H]  4.7   |  22  |  1.16    Ca    8.9      16 Apr 2025 00:38    TPro  7.5  /  Alb  3.8  /  TBili  0.6  /  DBili  x   /  AST  37  /  ALT  36  /  AlkPhos  70  04-16    PT/INR - ( 16 Apr 2025 06:37 )   PT: 12.3 sec;   INR: 1.06 ratio         PTT - ( 16 Apr 2025 06:37 )  PTT:30.2 sec    PHYSICAL EXAM  General: WN/WD NAD  Neurology: A&Ox3, nonfocal, DASH x 4  Respiratory: CTA B/L  CV: RRR, S1S2, no murmurs, rubs or gallops  Abdominal: Soft, NT, ND +BS, Last BM  Extremities: No edema, + peripheral pulses        ASSESSMENT/ PLAN:   HPI:  75-year-old male [place] resident with schizophrenia hypertension high cholesterol and diabetes and he presents initially with vomiting and diarrhea assumed to be evaluated gastroenteritis however while he was here he had large episode of vomiting coffee ground emesis.  Cardiology-Dr Mistry  h/o PTC, aicd  on plavix    currently comfortable in ED without n/v or abd pain  refuses NGT placement    < from: CT Abdomen and Pelvis w/ IV Cont (04.16.25 @ 05:07) >  FINDINGS:  LOWER CHEST: Trace bilateral pleural fluid. Cardiac leads.    LIVER: Within normal limits.  BILE DUCTS: Normal caliber.  GALLBLADDER: Within normal limits.  SPLEEN: Within normal limits.  PANCREAS: Within normal limits.  ADRENALS: Within normal limits.  KIDNEYS/URETERS: Right renal cyst.    BLADDER: Within normal limits.  REPRODUCTIVE ORGANS: Prostate is enlarged.    BOWEL: Hiatal hernia. Thickening of the distal esophagus. Mildly dilated   fluid-filled loops of small bowel measuring up to 3.3 cm in diameter with   a transition point in the mid abdomen (2:84) consistent with at least   partial obstruction. No bowel wall thickening or pneumatosis. Appendix is   normal.  PERITONEUM/RETROPERITONEUM: Within normal limits.  VESSELS: Atherosclerotic changes.  LYMPH NODES: No lymphadenopathy.  ABDOMINAL WALL: Small fat-containing umbilical hernia. Small bilateral   fat-containing inguinal hernias.  BONES: Degenerative changes. Grade 1 retrolisthesis L5 on S1.    IMPRESSION:    Small hiatal hernia. Thickening of the distal esophagus question   esophagitis. This would be better evaluated with endoscopy.    Mildly dilated fluid-filled loops of small bowel measuring up to 3.3 cm   in diameter with a transition point in the mid abdomen (2:84) consistent   with at least partial obstruction. No bowel wall thickening or   pneumatosis.    < end of copied text >      PAST MEDICAL & SURGICAL HISTORY:  HTN      Schizophrenia      CHF (congestive heart failure)      HLD (hyperlipidemia)      Vertigo      GERD (gastroesophageal reflux disease)      Hydrocele  Hydrocele removal 2007      S/P PTCA (Percutaneous Transluminal Coronary Angioplasty)      AICD (automatic cardioverter/defibrillator) present          Vital Signs Last 24 Hrs  T(C): 37 (16 Apr 2025 03:51), Max: 37 (16 Apr 2025 03:51)  T(F): 98.6 (16 Apr 2025 03:51), Max: 98.6 (16 Apr 2025 03:51)  HR: 106 (16 Apr 2025 03:51) (85 - 106)  BP: 149/80 (16 Apr 2025 03:51) (141/95 - 157/95)  BP(mean): --  RR: 18 (16 Apr 2025 03:51) (16 - 20)  SpO2: 95% (16 Apr 2025 03:51) (95% - 97%)    Parameters below as of 16 Apr 2025 03:51  Patient On (Oxygen Delivery Method): room air                              13.3   16.61 )-----------( 463      ( 16 Apr 2025 06:37 )             42.6     04-16    135  |  104  |  18  ----------------------------<  155[H]  4.7   |  22  |  1.16    Ca    8.9      16 Apr 2025 00:38    TPro  7.5  /  Alb  3.8  /  TBili  0.6  /  DBili  x   /  AST  37  /  ALT  36  /  AlkPhos  70  04-16    PT/INR - ( 16 Apr 2025 06:37 )   PT: 12.3 sec;   INR: 1.06 ratio         PTT - ( 16 Apr 2025 06:37 )  PTT:30.2 sec    PHYSICAL EXAM  General: WN/WD NAD  Neurology: Awake, alert, nonfocal, DASH x 4  Respiratory: CTA B/L  CV: S1S2  Abdominal: Soft, distended, NT, no guarding or peritoneal signs  Extremities: No c/c        ASSESSMENT/ PLAN:  75-year-old male [place] resident with schizophrenia hypertension high cholesterol and diabetes and he presents initially with vomiting and diarrhea assumed to be evaluated gastroenteritis however while he was here he had large episode of vomiting coffee ground emesis.  Cardiology-Dr Mistry  h/o PTC, aicd  on plavix    currently comfortable in ED without n/v or abd pain  refuses NGT placement    < from: CT Abdomen and Pelvis w/ IV Cont (04.16.25 @ 05:07) >  FINDINGS:  LOWER CHEST: Trace bilateral pleural fluid. Cardiac leads.    LIVER: Within normal limits.  BILE DUCTS: Normal caliber.  GALLBLADDER: Within normal limits.  SPLEEN: Within normal limits.  PANCREAS: Within normal limits.  ADRENALS: Within normal limits.  KIDNEYS/URETERS: Right renal cyst.    BLADDER: Within normal limits.  REPRODUCTIVE ORGANS: Prostate is enlarged.    BOWEL: Hiatal hernia. Thickening of the distal esophagus. Mildly dilated   fluid-filled loops of small bowel measuring up to 3.3 cm in diameter with   a transition point in the mid abdomen (2:84) consistent with at least   partial obstruction. No bowel wall thickening or pneumatosis. Appendix is   normal.  PERITONEUM/RETROPERITONEUM: Within normal limits.  VESSELS: Atherosclerotic changes.  LYMPH NODES: No lymphadenopathy.  ABDOMINAL WALL: Small fat-containing umbilical hernia. Small bilateral   fat-containing inguinal hernias.  BONES: Degenerative changes. Grade 1 retrolisthesis L5 on S1.    IMPRESSION:    Small hiatal hernia. Thickening of the distal esophagus question   esophagitis. This would be better evaluated with endoscopy.    Mildly dilated fluid-filled loops of small bowel measuring up to 3.3 cm   in diameter with a transition point in the mid abdomen (2:84) consistent   with at least partial obstruction. No bowel wall thickening or   pneumatosis.    < end of copied text >    pt admitted Dr Mistry  pt refusing NGT, no current n/v  acknowledged bowel function  coffee ground emesis, r/o GIB  dilated SB with ?psbo  case discussed with Dr Potter  npo, iv hydration  GI consult  serial abd exams  NGT if n/v persists  medical/cardiology optimization

## 2025-04-16 NOTE — H&P ADULT - PROBLEM SELECTOR PLAN 2
-hx of HTN  -c/w home meds w/parameters -meets SIRS criteria WBC 16.61,   -afebrile and no clear source of infection  -f/u BCx -meets SIRS criteria WBC 16.61,   -afebrile and no clear source of infection  -s/p 2L LR in ED  -c/w maintenance fluids 75 ccs x 12 hrs   -f/u BCx

## 2025-04-16 NOTE — CONSULT NOTE ADULT - ASSESSMENT
75y old  Male PMH of schizophrenia, HTN, HLD, AICD, and DM who presents with a chief complaint of vomiting, diarrhea     PLAN:  #vomiting  r/o sbo    -CTAP noted above, Small hiatal hernia. Thickening of the distal esophagus question esophagitis. Mildly dilated fluid-filled loops of small bowel measuring up to 3.3 cm   in diameter with a transition point in the mid abdomen (2:84) consistent with at least partial obstruction. No bowel wall thickening or pneumatosis.  -Surgery consult noted, no acute intervention  -f/u small bowel series to r/o SBO  -Monitor HgB, stable  -BUN noted, normal. less likely upper GI bleed  -Aspiration precautions  -Antiemetics as needed  -PPI IV BID    This note and its recommendations herein are preliminary until such time as cosigned by an attending.

## 2025-04-16 NOTE — ED ADULT NURSE NOTE - OBJECTIVE STATEMENT
The patient is a 75 y Male complaining of vomiting and Diarrhea from this evening . pt is very weak and abdomin distended ,  hypo bowl sounds

## 2025-04-16 NOTE — CONSULT NOTE ADULT - NS ATTEND AMEND GEN_ALL_CORE FT
75F with no prior abdominal surgery presents with nausea vomiting, possible hematemesis (although patient denies this) and copious amounts of diarrhea (last one last night). Patient has extensive medical/cardiac hx. Patient had a CT scan showing SBO with transition point as well as hiatal hernia with possible esophagitis.     On exam he is aox3, softly distended with tympany, no bowel function today, no nausea or vomiting. wbc 12k, remains afebrile    1. Small bowel obstruction  - given recent hx of diarrhea, and no prior abdominal surgeries, unlikely to be a true SBO, check stool cultures, and obtain Small bowel series to evaluate further  - can consult GI for the esophagitis and possible coffee ground emesis, no interventions for the hiatal hernia at this time, rec protonix
Pt seen in his room this afternoon. Pt was asymptomatic on exam. No abd distension or tenderness w/palpation. No n/v. Pt passing flatus. He was agreeable to trying NGT placement for decompression if needed, otherwise okay with advancing diet as tolerated. CTAP showed hiatal hernia and distal esophageal wall thickening, but no dysphagia or odynophagia. Given pt's body habitus, he may have hiatal hernia w/acid reflux-related esophagitis.    [ ] No indication for inpatient endoscopic evaluation at this time  [ ] F/U surgery team recs re:potential SBO.  If NGT not needed, advance diet as tolerated  [ ] Outpatient GI clinic f/u within 1 month of d/c home  [ ] GI team will sign off; please call back with further questions. Rest as per NP's note.

## 2025-04-16 NOTE — H&P ADULT - PROBLEM SELECTOR PLAN 1
-p/w 1 day hx of vomiting and diarrhea  -found to have large episode of coffee ground emesis in ED  -CT a/p: Mildly dilated fluid-filled loops of small bowel measuring up to 3.3 cm in diameter with a transition point in the mid abdomen (2:84) consistent with at least partial obstruction. No bowel wall thickening or pneumatosis.  -surgery consulted, however pt refused NGT as he wants to go without food for now and thus pt will be admitted to medicine  -surgery team to follow  -NPO for now  -IV fluids at 75 ccs x 12 hrs  -GI consult to r/o GIB -p/w 1 day hx of vomiting and diarrhea  -found to have large episode of coffee ground emesis in ED  -s/p Reglan, PPI, and 2L LR in ED  -CT a/p: Mildly dilated fluid-filled loops of small bowel measuring up to 3.3 cm in diameter with a transition point in the mid abdomen (2:84) consistent with at least partial obstruction. No bowel wall thickening or pneumatosis.  -surgery consulted, however pt refused NGT as he wants to go without food for now and thus pt will be admitted to medicine  -surgery team to follow  -NPO for now  -IV fluids at 75 ccs x 12 hrs  -GI consulted to r/o GIB

## 2025-04-16 NOTE — ED PROVIDER NOTE - CARE PLAN
Principal Discharge DX:	SBO (small bowel obstruction)   1 Principal Discharge DX:	SBO (small bowel obstruction)  Secondary Diagnosis:	GIB (gastrointestinal bleeding)

## 2025-04-16 NOTE — H&P ADULT - NSHPPOAPULMEMBOLUS_GEN_A_CORE
EMERGENCY DEPARTMENT ENCOUNTER   ATTENDING ATTESTATION     Pt Name: Brad Norwood Jr.  MRN: [id number]  Birthdate 2014  Date of evaluation: 1/21/24       Brad Norwood Jr. is a 9 y.o. male who presents with Eye Injury and Facial Swelling      MDM: 9-year-old male presents for complaints of right high injury.  Patient was sliding, reportedly was sliding down a hill and ran into some brush, reports he hit his eye on a branch, complaining of some pain around the eyes denies any eye pain itself denies any vision loss    On exam patient noted some bruising around the right eye, superficial abrasion noted    Will check fluorescein staining    Patient noted to have abrasion will start on antibiotic    Patient is PECARN low risk that no imaging needed, result was discussed with mom discussed findings of the corneal abrasion discussed supportive care discussed need for follow-up with PCP, ophthalmology, and return precautions, mom voiced understanding comfortable plan and discharge    Patient/Guardian was informed of their diagnosis and told to follow up with PCP & ophthalmology in 1-3 days. Patient demonstrates understanding and agreement with the plan. They were given the opportunity to ask questions and those questions were answered to the best of our ability with the available information. Patient/Guardian told to return to the ED for any new, worsening, changing or persistent symptoms.       This dictation was prepared using Dragon Medical voice recognition software.       Vitals:   Vitals:    01/21/24 1529   BP: 104/52   Pulse: 60   Resp: 20   Temp: 98 °F (36.7 °C)   SpO2: 98%   Weight: 39.5 kg (87 lb)   Height: 1.44 m (4' 8.69\")         I personally saw and examined the patient. I have reviewed and agree with the resident's findings, including all diagnostic interpretations and treatment plan as written. I was present for the key portions of any procedures performed and the inclusive time noted for any critical care   There are small amounts of dried blood from bilateral naris. Nasal cartilage straight, no deformity. Oropharynx shows mild swelling of the adenoid tissues w/o any purulent discharge or erythema.  (Mom states that this is been something she is aware of).  There is a small hemostatic cut of the patient's lingula near the floor of the oral cavity.  No active bleeding noted.  No loose teeth noted.  HRRR.  Lungs CTA.    DDX/DIAGNOSTIC RESULTS / EMERGENCY DEPARTMENT COURSE / MDM     Medical Decision Making  See ED course    Risk  Prescription drug management.        EKG    All EKG's are interpreted by the Emergency Department Physician who either signs or Co-signs this chart in the absence of a cardiologist.    EMERGENCY DEPARTMENT COURSE:    ED Course as of 01/21/24 6567   Sun Jan 21, 2024   2705 Patient is a 9-year-old male presents to ED with mom at bedside for complaint of of right facial swelling and concern for eye injury after patient was sliding down a hill and went farther than expected; sliding into heavy brush and hitting his face on an object in the foliage.  Patient not have any loss of consciousness.  Patient was able to immediately get up in the event.  Patient denies any vision changes or blurry vision.  Mom states that patient had a minor difficulty with forming words due to a small cut in his mouth that was bleeding after the event initially.  There is no active blood now.  There is also concern for a broken nose as patient had bleeding from bilateral naris.    Patient reports no sensation of foreign body in his right eye    Mom reports no chronic health issues    Physical Exam: GCS 15, A&O x 4, VSS, PERRL, EOMI.  Soft tissue contusions around the right orbit that are minimally tender to palpation.  No swelling of the upper or lower preseptal tissue.  There is small amounts of dried blood from bilateral naris. Nasal cartilage straight, no deformity. Oropharynx shows mild swelling of the adenoid tissues  no

## 2025-04-16 NOTE — ED PROVIDER NOTE - CLINICAL SUMMARY MEDICAL DECISION MAKING FREE TEXT BOX
patient had a large episode of coffee-ground emesis your abdomen is distended, putting an NG tube and CT shows small bowel obstruction will admit to surgery. patient had a large episode of coffee-ground emesis your abdomen is distended, putting an NG tube and CT shows small bowel obstruction will admit to surgery.    Surgical team consulted.  Patient initially I attempted to place an NG tube however patient said no call the doctor.  Of note patient also refused the NG tube from the surgical house officer.   Since patient is refusing treatment for small bowel obstruction surgical house officer did feel the patient will be better suited for the medical team because of his schizophrenia and pacemaker.

## 2025-04-16 NOTE — H&P ADULT - NSHPREVIEWOFSYSTEMS_GEN_ALL_CORE
CONSTITUTIONAL: No fever, weight loss, or fatigue  EYES: No eye pain, visual disturbances, or discharge  ENT:  No difficulty hearing, tinnitus, vertigo; No sinus or throat pain  NECK: No pain or stiffness  RESPIRATORY: No cough, wheezing, chills or hemoptysis; No Shortness of Breath  CARDIOVASCULAR: No chest pain, palpitations, passing out, dizziness, or leg swelling  GASTROINTESTINAL: +vomiting and diarrhea No abdominal or epigastric pain. No nausea, or hematemesis; or constipation. No melena or hematochezia.  GENITOURINARY: No dysuria, frequency, hematuria, or incontinence  NEUROLOGICAL: No headaches, memory loss, loss of strength, numbness, or tremors  SKIN: No itching, burning, rashes, or lesions   LYMPH Nodes: No enlarged glands  ENDOCRINE: No heat or cold intolerance; No hair loss  MUSCULOSKELETAL: No joint pain or swelling; No muscle, back, or extremity pain  PSYCHIATRIC: No depression, anxiety, mood swings, or difficulty sleeping  HEME/LYMPH: No easy bruising, or bleeding gums  ALLERGY AND IMMUNOLOGIC: No hives or eczema

## 2025-04-16 NOTE — CONSULT NOTE ADULT - TIME BILLING
Physician time spent included preparing to see patient, obtaining and reviewing separate obtained history, performing a medically appropriate examination and evaluation, counseling and educating the patient/family/caregiver. Ordering medications, tests, procedures, referring and communicating with other health care professionals. In addition,  documenting clinical information in the electronic health record. In addition, independently interpreting results and communicating results to the patient/family/caregiver, and care coordination.
chart review, documentation, in-person evaluation, and coordination of care

## 2025-04-16 NOTE — H&P ADULT - NSHPPHYSICALEXAM_GEN_ALL_CORE
ICU Vital Signs Last 24 Hrs  T(C): 36.7 (16 Apr 2025 07:27), Max: 37 (16 Apr 2025 03:51)  T(F): 98 (16 Apr 2025 07:27), Max: 98.6 (16 Apr 2025 03:51)  HR: 121 (16 Apr 2025 07:27) (85 - 121)  BP: 119/84 (16 Apr 2025 07:27) (119/84 - 157/95)  BP(mean): --  ABP: --  ABP(mean): --  RR: 21 (16 Apr 2025 07:27) (16 - 21)  SpO2: 94% (16 Apr 2025 07:27) (94% - 97%)    O2 Parameters below as of 16 Apr 2025 07:27  Patient On (Oxygen Delivery Method): room air    GENERAL: NAD, well-groomed, well-developed  HEAD:  Atraumatic, Normocephalic  EYES: EOMI, PERRLA, conjunctiva and sclera clear  ENMT: No tonsillar erythema, exudates, or enlargement; Moist mucous membranes, No lesions  NECK: Supple, normal appearance, No JVD; Normal thyroid; Trachea midline  NERVOUS SYSTEM:  Alert & Oriented X3,  Motor Strength 5/5 B/L upper and lower extremities, sensation intact, CN II-XII intact.   CHEST/LUNG: Lungs clear to auscultation bilaterally, No rales, rhonchi, wheezing   HEART: Regular rate and rhythm; No murmurs, rubs, or gallops  ABDOMEN: +distended abdomen, Soft, Nontender; Bowel sounds present  : No suprapubic tenderness, CVAT;   EXTREMITIES:  2+ Peripheral Pulses, No clubbing, cyanosis, or edema  LYMPH: No lymphadenopathy noted  SKIN: No rashes or lesions;  Good capillary refill

## 2025-04-17 LAB
ALBUMIN SERPL ELPH-MCNC: 2.9 G/DL — LOW (ref 3.5–5)
ALP SERPL-CCNC: 42 U/L — SIGNIFICANT CHANGE UP (ref 40–120)
ALT FLD-CCNC: 23 U/L DA — SIGNIFICANT CHANGE UP (ref 10–60)
ANION GAP SERPL CALC-SCNC: 6 MMOL/L — SIGNIFICANT CHANGE UP (ref 5–17)
AST SERPL-CCNC: 20 U/L — SIGNIFICANT CHANGE UP (ref 10–40)
BASOPHILS # BLD AUTO: 0.02 K/UL — SIGNIFICANT CHANGE UP (ref 0–0.2)
BASOPHILS NFR BLD AUTO: 0.3 % — SIGNIFICANT CHANGE UP (ref 0–2)
BILIRUB SERPL-MCNC: 0.7 MG/DL — SIGNIFICANT CHANGE UP (ref 0.2–1.2)
BUN SERPL-MCNC: 23 MG/DL — HIGH (ref 7–18)
CALCIUM SERPL-MCNC: 8.5 MG/DL — SIGNIFICANT CHANGE UP (ref 8.4–10.5)
CHLORIDE SERPL-SCNC: 107 MMOL/L — SIGNIFICANT CHANGE UP (ref 96–108)
CO2 SERPL-SCNC: 25 MMOL/L — SIGNIFICANT CHANGE UP (ref 22–31)
CREAT SERPL-MCNC: 0.87 MG/DL — SIGNIFICANT CHANGE UP (ref 0.5–1.3)
EGFR: 90 ML/MIN/1.73M2 — SIGNIFICANT CHANGE UP
EGFR: 90 ML/MIN/1.73M2 — SIGNIFICANT CHANGE UP
EOSINOPHIL # BLD AUTO: 0.09 K/UL — SIGNIFICANT CHANGE UP (ref 0–0.5)
EOSINOPHIL NFR BLD AUTO: 1.2 % — SIGNIFICANT CHANGE UP (ref 0–6)
GLUCOSE BLDC GLUCOMTR-MCNC: 101 MG/DL — HIGH (ref 70–99)
GLUCOSE BLDC GLUCOMTR-MCNC: 105 MG/DL — HIGH (ref 70–99)
GLUCOSE BLDC GLUCOMTR-MCNC: 122 MG/DL — HIGH (ref 70–99)
GLUCOSE BLDC GLUCOMTR-MCNC: 96 MG/DL — SIGNIFICANT CHANGE UP (ref 70–99)
GLUCOSE SERPL-MCNC: 110 MG/DL — HIGH (ref 70–99)
HCT VFR BLD CALC: 35.3 % — LOW (ref 39–50)
HGB BLD-MCNC: 11.3 G/DL — LOW (ref 13–17)
IMM GRANULOCYTES NFR BLD AUTO: 0.1 % — SIGNIFICANT CHANGE UP (ref 0–0.9)
LYMPHOCYTES # BLD AUTO: 0.97 K/UL — LOW (ref 1–3.3)
LYMPHOCYTES # BLD AUTO: 12.6 % — LOW (ref 13–44)
MAGNESIUM SERPL-MCNC: 2.1 MG/DL — SIGNIFICANT CHANGE UP (ref 1.6–2.6)
MCHC RBC-ENTMCNC: 20.5 PG — LOW (ref 27–34)
MCHC RBC-ENTMCNC: 32 G/DL — SIGNIFICANT CHANGE UP (ref 32–36)
MCV RBC AUTO: 64.2 FL — LOW (ref 80–100)
MONOCYTES # BLD AUTO: 0.89 K/UL — SIGNIFICANT CHANGE UP (ref 0–0.9)
MONOCYTES NFR BLD AUTO: 11.5 % — SIGNIFICANT CHANGE UP (ref 2–14)
NEUTROPHILS # BLD AUTO: 5.74 K/UL — SIGNIFICANT CHANGE UP (ref 1.8–7.4)
NEUTROPHILS NFR BLD AUTO: 74.3 % — SIGNIFICANT CHANGE UP (ref 43–77)
NRBC BLD AUTO-RTO: 0 /100 WBCS — SIGNIFICANT CHANGE UP (ref 0–0)
PHOSPHATE SERPL-MCNC: 2.3 MG/DL — LOW (ref 2.5–4.5)
PLATELET # BLD AUTO: 399 K/UL — SIGNIFICANT CHANGE UP (ref 150–400)
POTASSIUM SERPL-MCNC: 3.8 MMOL/L — SIGNIFICANT CHANGE UP (ref 3.5–5.3)
POTASSIUM SERPL-SCNC: 3.8 MMOL/L — SIGNIFICANT CHANGE UP (ref 3.5–5.3)
PROT SERPL-MCNC: 5.8 G/DL — LOW (ref 6–8.3)
RBC # BLD: 5.5 M/UL — SIGNIFICANT CHANGE UP (ref 4.2–5.8)
RBC # FLD: 16.5 % — HIGH (ref 10.3–14.5)
SODIUM SERPL-SCNC: 138 MMOL/L — SIGNIFICANT CHANGE UP (ref 135–145)
WBC # BLD: 7.72 K/UL — SIGNIFICANT CHANGE UP (ref 3.8–10.5)
WBC # FLD AUTO: 7.72 K/UL — SIGNIFICANT CHANGE UP (ref 3.8–10.5)

## 2025-04-17 PROCEDURE — 74250 X-RAY XM SM INT 1CNTRST STD: CPT | Mod: 26

## 2025-04-17 RX ORDER — DIATRIZOATE MEGLUMINE, SODIUM 66 %-10 %
90 VIAL (ML) INJECTION ONCE
Refills: 0 | Status: COMPLETED | OUTPATIENT
Start: 2025-04-17 | End: 2025-04-17

## 2025-04-17 RX ADMIN — CLOPIDOGREL BISULFATE 75 MILLIGRAM(S): 75 TABLET, FILM COATED ORAL at 12:56

## 2025-04-17 RX ADMIN — Medication 325 MILLIGRAM(S): at 12:55

## 2025-04-17 RX ADMIN — LOSARTAN POTASSIUM 50 MILLIGRAM(S): 100 TABLET, FILM COATED ORAL at 05:58

## 2025-04-17 RX ADMIN — METOPROLOL SUCCINATE 50 MILLIGRAM(S): 50 TABLET, EXTENDED RELEASE ORAL at 05:58

## 2025-04-17 RX ADMIN — Medication 50 MILLIGRAM(S): at 00:22

## 2025-04-17 RX ADMIN — SODIUM CHLORIDE 75 MILLILITER(S): 9 INJECTION, SOLUTION INTRAVENOUS at 08:09

## 2025-04-17 RX ADMIN — Medication 50 MILLIGRAM(S): at 05:58

## 2025-04-17 RX ADMIN — Medication 90 MILLILITER(S): at 14:51

## 2025-04-17 RX ADMIN — Medication 50 MILLIGRAM(S): at 17:33

## 2025-04-17 RX ADMIN — METOPROLOL SUCCINATE 50 MILLIGRAM(S): 50 TABLET, EXTENDED RELEASE ORAL at 17:35

## 2025-04-17 NOTE — PROGRESS NOTE ADULT - ASSESSMENT
75M from [facility], ambulates with a cane, w/PMH of schizophrenia, HTN, HLD, AICD, and DM presents w/1 day hx of vomiting and diarrhea. Found to have large episode of coffee ground emesis in ED. CT a/p: partial SBO. Admitted to medicine for partial SBO and coffee ground emesis r/o GIB. GI and surgery following.

## 2025-04-17 NOTE — PROGRESS NOTE ADULT - SUBJECTIVE AND OBJECTIVE BOX
Patient is a 75y old  Male who presents with a chief complaint of vomiting, diarrhea (17 Apr 2025 11:37)      INTERVAL HPI/OVERNIGHT EVENTS: Pt stated that his vomiting resolved, pt alert, sleepy, abd soft, mild tender, denies passing gas. Per surgery, continue stool count and small bowel series. keep npo for now.         REVIEW OF SYSTEMS:  CONSTITUTIONAL: No fever, chills  ENMT:  No difficulty hearing, no change in vision  NECK: No pain or stiffness  RESPIRATORY: No cough, SOB  CARDIOVASCULAR: No chest pain, palpitations  GASTROINTESTINAL: No abdominal pain. No nausea, vomiting, or diarrhea  GENITOURINARY: No dysuria  NEUROLOGICAL: No HA  SKIN: No itching, burning, rashes, or lesions   LYMPH NODES: No enlarged glands  ENDOCRINE: No heat or cold intolerance; No hair loss  MUSCULOSKELETAL: No joint pain or swelling; No muscle, back, or extremity pain  PSYCHIATRIC: No depression, anxiety  HEME/LYMPH: No easy bruising, or bleeding gums    T(C): 36.8 (04-17-25 @ 12:01), Max: 36.8 (04-17-25 @ 12:01)  HR: 75 (04-17-25 @ 12:01) (73 - 103)  BP: 105/66 (04-17-25 @ 12:01) (105/66 - 139/93)  RR: 18 (04-17-25 @ 12:01) (18 - 20)  SpO2: 91% (04-17-25 @ 12:01) (91% - 93%)  Wt(kg): --Vital Signs Last 24 Hrs  T(C): 36.8 (17 Apr 2025 12:01), Max: 36.8 (17 Apr 2025 12:01)  T(F): 98.3 (17 Apr 2025 12:01), Max: 98.3 (17 Apr 2025 12:01)  HR: 75 (17 Apr 2025 12:01) (73 - 103)  BP: 105/66 (17 Apr 2025 12:01) (105/66 - 139/93)  BP(mean): --  RR: 18 (17 Apr 2025 12:01) (18 - 20)  SpO2: 91% (17 Apr 2025 12:01) (91% - 93%)    Parameters below as of 17 Apr 2025 12:01  Patient On (Oxygen Delivery Method): room air    MEDICATIONS  (STANDING):  ARIPiprazole 15 milliGRAM(s) Oral daily  atorvastatin 10 milliGRAM(s) Oral at bedtime  chlorproMAZINE    Tablet 50 milliGRAM(s) Oral four times a day  clopidogrel Tablet 75 milliGRAM(s) Oral daily  ferrous    sulfate 325 milliGRAM(s) Oral daily  influenza  Vaccine (HIGH DOSE) 0.5 milliLiter(s) IntraMuscular once  lactated ringers. 1000 milliLiter(s) (75 mL/Hr) IV Continuous <Continuous>  lactated ringers. 1000 milliLiter(s) (75 mL/Hr) IV Continuous <Continuous>  losartan 50 milliGRAM(s) Oral daily  metoprolol tartrate 50 milliGRAM(s) Oral two times a day    MEDICATIONS  (PRN):  ondansetron Injectable 4 milliGRAM(s) IV Push every 8 hours PRN Nausea and/or Vomiting      PHYSICAL EXAM:  GENERAL: NAD  EYES: clear conjunctiva; EOMI  ENMT: Moist mucous membranes  NECK: Supple, No JVD, Normal thyroid  CHEST/LUNG: Clear to auscultation bilaterally; No rales, rhonchi, wheezing, or rubs  HEART: S1, S2, Regular rate and rhythm  ABDOMEN: Soft, mild tender, Nondistended  NEURO: Alert & awake   EXTREMITIES: No LE edema, no calf tenderness  LYMPH: No lymphadenopathy noted  SKIN: No rashes or lesions    Consultant(s) Notes Reviewed:  [x ] YES  [ ] NO  Care Discussed with Consultants/Other Providers [ x] YES  [ ] NO    LABS:                        11.3   7.72  )-----------( 399      ( 17 Apr 2025 07:15 )             35.3     04-17    138  |  107  |  23[H]  ----------------------------<  110[H]  3.8   |  25  |  0.87    Ca    8.5      17 Apr 2025 07:15  Phos  2.3     04-17  Mg     2.1     04-17    TPro  5.8[L]  /  Alb  2.9[L]  /  TBili  0.7  /  DBili  x   /  AST  20  /  ALT  23  /  AlkPhos  42  04-17    PT/INR - ( 16 Apr 2025 06:37 )   PT: 12.3 sec;   INR: 1.06 ratio         PTT - ( 16 Apr 2025 06:37 )  PTT:30.2 sec  CAPILLARY BLOOD GLUCOSE      POCT Blood Glucose.: 101 mg/dL (17 Apr 2025 11:36)  POCT Blood Glucose.: 105 mg/dL (17 Apr 2025 06:07)  POCT Blood Glucose.: 127 mg/dL (16 Apr 2025 23:52)        Urinalysis Basic - ( 17 Apr 2025 07:15 )    Color: x / Appearance: x / SG: x / pH: x  Gluc: 110 mg/dL / Ketone: x  / Bili: x / Urobili: x   Blood: x / Protein: x / Nitrite: x   Leuk Esterase: x / RBC: x / WBC x   Sq Epi: x / Non Sq Epi: x / Bacteria: x        RADIOLOGY & ADDITIONAL TESTS:    Imaging Personally Reviewed:  [x ] YES  [ ] NO    < from: CT Abdomen and Pelvis w/ IV Cont (04.16.25 @ 05:07) >    ACC: [id number] EXAM:  CT ABDOMEN AND PELVIS IC   ORDERED BY: SADA CUMMINS     PROCEDURE DATE:  04/16/2025          INTERPRETATION:  CLINICAL INFORMATION: Coffee ground emesis    COMPARISON: CT chest 4/6/2023. CT lumbar spine 1/13/2024.    CONTRAST/COMPLICATIONS:  IV Contrast: Omnipaque 350  90 cc administered   10 cc discarded  Oral Contrast: NONE  .    PROCEDURE:  CT of the Abdomen and Pelvis was performed.  Sagittal and coronal reformats were performed.    FINDINGS:  LOWER CHEST: Trace bilateral pleural fluid. Cardiac leads.    LIVER: Within normal limits.  BILE DUCTS: Normal caliber.  GALLBLADDER: Within normal limits.  SPLEEN: Within normal limits.  PANCREAS: Within normal limits.  ADRENALS: Within normal limits.  KIDNEYS/URETERS: Right renal cyst.    BLADDER: Within normal limits.  REPRODUCTIVE ORGANS: Prostate is enlarged.    BOWEL: Hiatal hernia. Thickening of the distal esophagus. Mildly dilated   fluid-filled loops of small bowel measuring up to 3.3 cm in diameter with   a transition point in the mid abdomen (2:84) consistent with at least   partial obstruction. No bowel wall thickening or pneumatosis. Appendix is   normal.  PERITONEUM/RETROPERITONEUM: Within normal limits.  VESSELS: Atherosclerotic changes.  LYMPH NODES: No lymphadenopathy.  ABDOMINAL WALL: Small fat-containing umbilical hernia. Small bilateral   fat-containing inguinal hernias.  BONES: Degenerative changes. Grade 1 retrolisthesis L5 on S1.    IMPRESSION:    Small hiatal hernia. Thickening of the distal esophagus question   esophagitis. This would be better evaluated with endoscopy.    Mildly dilated fluid-filled loops of small bowel measuring up to 3.3 cm   in diameter with a transition point in the mid abdomen (2:84) consistent   with at least partial obstruction. No bowel wall thickening or   pneumatosis.      --- End of Report ---            SELAM SALVADOR MD; Attending Radiologist  This document has been electronically signed. Apr 16 2025  5:27AM    < end of copied text >

## 2025-04-17 NOTE — PROGRESS NOTE ADULT - PROBLEM SELECTOR PLAN 2
-meets SIRS criteria WBC 16.61,   -afebrile and no clear source of infection  -s/p 2L LR in ED  -c/w maintenance fluids 75 ccs x 12 hrs   -f/u BCx

## 2025-04-17 NOTE — PROGRESS NOTE ADULT - SUBJECTIVE AND OBJECTIVE BOX
Patient is a 75y old  Male who presents with a chief complaint of vomiting, diarrhea (17 Apr 2025       INTERVAL HPI/OVERNIGHT EVENTS: Pt stated that his vomiting resolved, pt alert, sleepy, abd soft, mild tender, denies passing gas. Per surgery, continue stool count and small bowel series. keep npo for now.         REVIEW OF SYSTEMS:  CONSTITUTIONAL: No fever, chills  ENMT:  No difficulty hearing, no change in vision  NECK: No pain or stiffness  RESPIRATORY: No cough, SOB  CARDIOVASCULAR: No chest pain, palpitations  GASTROINTESTINAL: No abdominal pain. No nausea, vomiting, or diarrhea  GENITOURINARY: No dysuria  NEUROLOGICAL: No HA  SKIN: No itching, burning, rashes, or lesions   LYMPH NODES: No enlarged glands  ENDOCRINE: No heat or cold intolerance; No hair loss  MUSCULOSKELETAL: No joint pain or swelling; No muscle, back, or extremity pain  PSYCHIATRIC: No depression, anxiety  HEME/LYMPH: No easy bruising, or bleeding gums    T(C): 36.8 (04-17-25 @ 12:01), Max: 36.8 (04-17-25 @ 12:01)  HR: 75 (04-17-25 @ 12:01) (73 - 103)  BP: 105/66 (04-17-25 @ 12:01) (105/66 - 139/93)  RR: 18 (04-17-25 @ 12:01) (18 - 20)  SpO2: 91% (04-17-25 @ 12:01) (91% - 93%)  Wt(kg): --Vital Signs Last 24 Hrs  T(C): 36.8 (17 Apr 2025 12:01), Max: 36.8 (17 Apr 2025 12:01)  T(F): 98.3 (17 Apr 2025 12:01), Max: 98.3 (17 Apr 2025 12:01)  HR: 75 (17 Apr 2025 12:01) (73 - 103)  BP: 105/66 (17 Apr 2025 12:01) (105/66 - 139/93)  BP(mean): --  RR: 18 (17 Apr 2025 12:01) (18 - 20)  SpO2: 91% (17 Apr 2025 12:01) (91% - 93%)    Parameters below as of 17 Apr 2025 12:01  Patient On (Oxygen Delivery Method): room air    MEDICATIONS  (STANDING):  ARIPiprazole 15 milliGRAM(s) Oral daily  atorvastatin 10 milliGRAM(s) Oral at bedtime  chlorproMAZINE    Tablet 50 milliGRAM(s) Oral four times a day  clopidogrel Tablet 75 milliGRAM(s) Oral daily  ferrous    sulfate 325 milliGRAM(s) Oral daily  influenza  Vaccine (HIGH DOSE) 0.5 milliLiter(s) IntraMuscular once  lactated ringers. 1000 milliLiter(s) (75 mL/Hr) IV Continuous <Continuous>  lactated ringers. 1000 milliLiter(s) (75 mL/Hr) IV Continuous <Continuous>  losartan 50 milliGRAM(s) Oral daily  metoprolol tartrate 50 milliGRAM(s) Oral two times a day    MEDICATIONS  (PRN):  ondansetron Injectable 4 milliGRAM(s) IV Push every 8 hours PRN Nausea and/or Vomiting      PHYSICAL EXAM:  GENERAL: NAD  EYES: clear conjunctiva; EOMI  ENMT: Moist mucous membranes  NECK: Supple, No JVD, Normal thyroid  CHEST/LUNG: dec breath sounds at bases  HEART: S1, S2,+  ABDOMEN: Soft, mild tender, Nondistended  NEURO: Alert & awake   EXTREMITIES: No LE edema, no calf tenderness  Consultant(s) Notes Reviewed:  [x ] YES  [ ] NO  Care Discussed with Consultants/Other Providers [ x] YES  [ ] NO    LABS:                        11.3   7.72  )-----------( 399      ( 17 Apr 2025 07:15 )             35.3     04-17    138  |  107  |  23[H]  ----------------------------<  110[H]  3.8   |  25  |  0.87    Ca    8.5      17 Apr 2025 07:15  Phos  2.3     04-17  Mg     2.1     04-17    TPro  5.8[L]  /  Alb  2.9[L]  /  TBili  0.7  /  DBili  x   /  AST  20  /  ALT  23  /  AlkPhos  42  04-17    PT/INR - ( 16 Apr 2025 06:37 )   PT: 12.3 sec;   INR: 1.06 ratio         PTT - ( 16 Apr 2025 06:37 )  PTT:30.2 sec  CAPILLARY BLOOD GLUCOSE      POCT Blood Glucose.: 101 mg/dL (17 Apr 2025 11:36)  POCT Blood Glucose.: 105 mg/dL (17 Apr 2025 06:07)  POCT Blood Glucose.: 127 mg/dL (16 Apr 2025 23:52)        Urinalysis Basic - ( 17 Apr 2025 07:15 )    Color: x / Appearance: x / SG: x / pH: x  Gluc: 110 mg/dL / Ketone: x  / Bili: x / Urobili: x   Blood: x / Protein: x / Nitrite: x   Leuk Esterase: x / RBC: x / WBC x   Sq Epi: x / Non Sq Epi: x / Bacteria: x        RADIOLOGY & ADDITIONAL TESTS:    Imaging Personally Reviewed:  [x ] YES  [ ] NO    < from: CT Abdomen and Pelvis w/ IV Cont (04.16.25 @ 05:07) >    ACC: [id number] EXAM:  CT ABDOMEN AND PELVIS IC   ORDERED BY: SADA CUMMINS     PROCEDURE DATE:  04/16/2025          INTERPRETATION:  CLINICAL INFORMATION: Coffee ground emesis    COMPARISON: CT chest 4/6/2023. CT lumbar spine 1/13/2024.    CONTRAST/COMPLICATIONS:  IV Contrast: Omnipaque 350  90 cc administered   10 cc discarded  Oral Contrast: NONE  .    PROCEDURE:  CT of the Abdomen and Pelvis was performed.  Sagittal and coronal reformats were performed.    FINDINGS:  LOWER CHEST: Trace bilateral pleural fluid. Cardiac leads.    LIVER: Within normal limits.  BILE DUCTS: Normal caliber.  GALLBLADDER: Within normal limits.  SPLEEN: Within normal limits.  PANCREAS: Within normal limits.  ADRENALS: Within normal limits.  KIDNEYS/URETERS: Right renal cyst.    BLADDER: Within normal limits.  REPRODUCTIVE ORGANS: Prostate is enlarged.    BOWEL: Hiatal hernia. Thickening of the distal esophagus. Mildly dilated   fluid-filled loops of small bowel measuring up to 3.3 cm in diameter with   a transition point in the mid abdomen (2:84) consistent with at least   partial obstruction. No bowel wall thickening or pneumatosis. Appendix is   normal.  PERITONEUM/RETROPERITONEUM: Within normal limits.  VESSELS: Atherosclerotic changes.  LYMPH NODES: No lymphadenopathy.  ABDOMINAL WALL: Small fat-containing umbilical hernia. Small bilateral   fat-containing inguinal hernias.  BONES: Degenerative changes. Grade 1 retrolisthesis L5 on S1.    IMPRESSION:    Small hiatal hernia. Thickening of the distal esophagus question   esophagitis. This would be better evaluated with endoscopy.    Mildly dilated fluid-filled loops of small bowel measuring up to 3.3 cm   in diameter with a transition point in the mid abdomen (2:84) consistent   with at least partial obstruction. No bowel wall thickening or   pneumatosis.      --- End of Report ---            SELAM SALVADOR MD; Attending Radiologist  This document has been electronically signed. Apr 16 2025  5:27AM    < end of copied text >

## 2025-04-17 NOTE — PROGRESS NOTE ADULT - SUBJECTIVE AND OBJECTIVE BOX
INTERVAL HPI/OVERNIGHT EVENTS:  Pt resting comfortably. Awake, comfortable. Pt denies nausea; however, is clutching emesis bag. Denies episodes of vomiting overnight. Denies passing flatus. Pt admits to loose BMs 1-2 days ago, unsure last one. No abd pain. No fever, chills.     MEDICATIONS  (STANDING):  ARIPiprazole 15 milliGRAM(s) Oral daily  atorvastatin 10 milliGRAM(s) Oral at bedtime  chlorproMAZINE    Tablet 50 milliGRAM(s) Oral four times a day  clopidogrel Tablet 75 milliGRAM(s) Oral daily  ferrous    sulfate 325 milliGRAM(s) Oral daily  influenza  Vaccine (HIGH DOSE) 0.5 milliLiter(s) IntraMuscular once  lactated ringers. 1000 milliLiter(s) (75 mL/Hr) IV Continuous <Continuous>  lactated ringers. 1000 milliLiter(s) (75 mL/Hr) IV Continuous <Continuous>  losartan 50 milliGRAM(s) Oral daily  metoprolol tartrate 50 milliGRAM(s) Oral two times a day    MEDICATIONS  (PRN):  ondansetron Injectable 4 milliGRAM(s) IV Push every 8 hours PRN Nausea and/or Vomiting      Vital Signs Last 24 Hrs  T(C): 36.7 (17 Apr 2025 05:24), Max: 36.7 (16 Apr 2025 19:56)  T(F): 98 (17 Apr 2025 05:24), Max: 98.1 (16 Apr 2025 19:56)  HR: 73 (17 Apr 2025 05:24) (73 - 106)  BP: 137/85 (17 Apr 2025 05:24) (137/85 - 149/84)  BP(mean): --  RR: 18 (17 Apr 2025 05:24) (18 - 20)  SpO2: 92% (17 Apr 2025 05:24) (92% - 93%)    Parameters below as of 17 Apr 2025 05:24  Patient On (Oxygen Delivery Method): room air        Physical:  General: NAD.   Resp: Unlabored breathing. Equal chest rise bilaterally.   Abdomen: Obese, Softly distended, nontender to palpation diffusely. +tympanic percussion. No voluntary guarding, no rebound tenderness, no palpable masses. No peritoneal signs.   Skin: Warm and dry.     I&O's Detail    16 Apr 2025 07:01  -  17 Apr 2025 07:00  --------------------------------------------------------  IN:  Total IN: 0 mL    OUT:    Voided (mL): 500 mL  Total OUT: 500 mL    Total NET: -500 mL          LABS:                        11.3   7.72  )-----------( 399      ( 17 Apr 2025 07:15 )             35.3             04-17    138  |  107  |  23[H]  ----------------------------<  110[H]  3.8   |  25  |  0.87    Ca    8.5      17 Apr 2025 07:15  Phos  2.3     04-17  Mg     2.1     04-17    TPro  5.8[L]  /  Alb  2.9[L]  /  TBili  0.7  /  DBili  x   /  AST  20  /  ALT  23  /  AlkPhos  42  04-17      75y.o. Male

## 2025-04-17 NOTE — PROGRESS NOTE ADULT - ASSESSMENT
75M from [facility], ambulates with a cane, w/PMH of schizophrenia, HTN, HLD, AICD, and DM presents w/1 day hx of vomiting and diarrhea. Found to have large episode of coffee ground emesis in ED. CT a/p: partial SBO. Admitted to medicine for partial SBO and coffee ground emesis r/o GIB. GI and surgery following.

## 2025-04-17 NOTE — PROGRESS NOTE ADULT - ASSESSMENT
75y.o. Male with partial SBO, coffee ground emesis likely secondary to enteritis  tachy overnight, VSS this AM, leukocytosis resolved     Plan  - obtain small bowel series to rule out SBO, f/u results  - NGT if pt vomits   - Keep NPO + IVF  - document stool count   - encourage ambulation / OOBTC  - zofran prn  - DVT ppx

## 2025-04-17 NOTE — PROGRESS NOTE ADULT - PROBLEM SELECTOR PLAN 1
-p/w 1 day hx of vomiting and diarrhea  -found to have large episode of coffee ground emesis in ED- resolved now   -CT a/p: Mildly dilated fluid-filled loops of small bowel measuring up to 3.3 cm in diameter with a transition point in the mid abdomen (2:84) consistent with at least partial obstruction. No bowel wall thickening or pneumatosis.  - Pt refused NGT - to surgery   - Per surgery, keep Pt NPO   - start stool count   - f/u small bowel series   - GI - no intervention   - surgery following

## 2025-04-18 LAB
ANION GAP SERPL CALC-SCNC: 5 MMOL/L — SIGNIFICANT CHANGE UP (ref 5–17)
BUN SERPL-MCNC: 26 MG/DL — HIGH (ref 7–18)
CALCIUM SERPL-MCNC: 8.1 MG/DL — LOW (ref 8.4–10.5)
CHLORIDE SERPL-SCNC: 109 MMOL/L — HIGH (ref 96–108)
CO2 SERPL-SCNC: 25 MMOL/L — SIGNIFICANT CHANGE UP (ref 22–31)
CREAT SERPL-MCNC: 0.88 MG/DL — SIGNIFICANT CHANGE UP (ref 0.5–1.3)
EGFR: 90 ML/MIN/1.73M2 — SIGNIFICANT CHANGE UP
EGFR: 90 ML/MIN/1.73M2 — SIGNIFICANT CHANGE UP
GLUCOSE BLDC GLUCOMTR-MCNC: 101 MG/DL — HIGH (ref 70–99)
GLUCOSE BLDC GLUCOMTR-MCNC: 87 MG/DL — SIGNIFICANT CHANGE UP (ref 70–99)
GLUCOSE BLDC GLUCOMTR-MCNC: 92 MG/DL — SIGNIFICANT CHANGE UP (ref 70–99)
GLUCOSE BLDC GLUCOMTR-MCNC: 98 MG/DL — SIGNIFICANT CHANGE UP (ref 70–99)
GLUCOSE SERPL-MCNC: 94 MG/DL — SIGNIFICANT CHANGE UP (ref 70–99)
HCT VFR BLD CALC: 31.4 % — LOW (ref 39–50)
HGB BLD-MCNC: 9.9 G/DL — LOW (ref 13–17)
MCHC RBC-ENTMCNC: 20.3 PG — LOW (ref 27–34)
MCHC RBC-ENTMCNC: 31.5 G/DL — LOW (ref 32–36)
MCV RBC AUTO: 64.3 FL — LOW (ref 80–100)
NRBC BLD AUTO-RTO: 0 /100 WBCS — SIGNIFICANT CHANGE UP (ref 0–0)
PLATELET # BLD AUTO: 365 K/UL — SIGNIFICANT CHANGE UP (ref 150–400)
POTASSIUM SERPL-MCNC: 3.5 MMOL/L — SIGNIFICANT CHANGE UP (ref 3.5–5.3)
POTASSIUM SERPL-SCNC: 3.5 MMOL/L — SIGNIFICANT CHANGE UP (ref 3.5–5.3)
RBC # BLD: 4.88 M/UL — SIGNIFICANT CHANGE UP (ref 4.2–5.8)
RBC # FLD: 16.7 % — HIGH (ref 10.3–14.5)
SODIUM SERPL-SCNC: 139 MMOL/L — SIGNIFICANT CHANGE UP (ref 135–145)
WBC # BLD: 6.4 K/UL — SIGNIFICANT CHANGE UP (ref 3.8–10.5)
WBC # FLD AUTO: 6.4 K/UL — SIGNIFICANT CHANGE UP (ref 3.8–10.5)

## 2025-04-18 PROCEDURE — 99232 SBSQ HOSP IP/OBS MODERATE 35: CPT | Mod: FS

## 2025-04-18 PROCEDURE — 71045 X-RAY EXAM CHEST 1 VIEW: CPT | Mod: 26

## 2025-04-18 RX ORDER — SODIUM CHLORIDE 9 G/1000ML
1000 INJECTION, SOLUTION INTRAVENOUS
Refills: 0 | Status: DISCONTINUED | OUTPATIENT
Start: 2025-04-18 | End: 2025-04-19

## 2025-04-18 RX ADMIN — SODIUM CHLORIDE 75 MILLILITER(S): 9 INJECTION, SOLUTION INTRAVENOUS at 08:05

## 2025-04-18 RX ADMIN — Medication 50 MILLIGRAM(S): at 23:54

## 2025-04-18 RX ADMIN — METOPROLOL SUCCINATE 50 MILLIGRAM(S): 50 TABLET, EXTENDED RELEASE ORAL at 17:52

## 2025-04-18 RX ADMIN — LOSARTAN POTASSIUM 50 MILLIGRAM(S): 100 TABLET, FILM COATED ORAL at 06:52

## 2025-04-18 RX ADMIN — SODIUM CHLORIDE 75 MILLILITER(S): 9 INJECTION, SOLUTION INTRAVENOUS at 00:34

## 2025-04-18 RX ADMIN — Medication 50 MILLIGRAM(S): at 12:46

## 2025-04-18 RX ADMIN — METOPROLOL SUCCINATE 50 MILLIGRAM(S): 50 TABLET, EXTENDED RELEASE ORAL at 06:52

## 2025-04-18 RX ADMIN — ARIPIPRAZOLE 15 MILLIGRAM(S): 2 TABLET ORAL at 12:46

## 2025-04-18 RX ADMIN — Medication 50 MILLIGRAM(S): at 00:00

## 2025-04-18 RX ADMIN — Medication 325 MILLIGRAM(S): at 12:46

## 2025-04-18 RX ADMIN — Medication 50 MILLIGRAM(S): at 06:51

## 2025-04-18 RX ADMIN — Medication 50 MILLIGRAM(S): at 17:50

## 2025-04-18 NOTE — PROGRESS NOTE ADULT - ASSESSMENT
75M from [facility], ambulates with a cane, w/PMH of schizophrenia, HTN, HLD, AICD, and DM presents w/1 day hx of vomiting and diarrhea. Found to have large episode of coffee ground emesis in ED. CT a/p: partial SBO. Admitted to medicine for partial SBO and coffee ground emesis r/o GIB. GI and surgery following.

## 2025-04-18 NOTE — PROGRESS NOTE ADULT - PROBLEM SELECTOR PLAN 1
-p/w 1 day hx of vomiting and diarrhea- resolved now   -CT a/p: Mildly dilated fluid-filled loops of small bowel measuring up to 3.3 cm in diameter with a transition point in the mid abdomen (2:84) consistent with at least partial obstruction. No bowel wall thickening or pneumatosis.  - Pt refused NGT - to surgery   - Per surgery, keep Pt NPO   - cont stool count   - f/u small bowel series - wad done yesterday  - GI - no intervention   - surgery following- f/u for further recs -p/w 1 day hx of vomiting and diarrhea- resolved now   -CT a/p: Mildly dilated fluid-filled loops of small bowel measuring up to 3.3 cm in diameter with a transition point in the mid abdomen (2:84) consistent with at least partial obstruction. No bowel wall thickening or pneumatosis.  - Pt refused NGT - to surgery   - Per surgery, keep Pt NPO   - cont stool count   - f/u small bowel series - was done yesterday- f/u results   - advance diet as tolerated, clears today   - GI - no intervention   - surgery following- f/u for further recs

## 2025-04-18 NOTE — PROGRESS NOTE ADULT - ASSESSMENT
75y.o. Male with partial SBO, coffee ground emesis likely secondary to enteritis  VSS, leukocytosis resolved     Plan  - obtain urgent abd XR, only one XR from [facility] noted in PACS  - Keep NPO + IVF  - document stool count   - encourage ambulation / OOBTC  - zofran prn  - DVT ppx   75y.o. Male with partial SBO, coffee ground emesis likely secondary to enteritis  VSS, leukocytosis resolved     Plan  - obtain urgent abd XR, only one XR from [facility] noted in PACS  - Can adavnce diet as tolerated and according to bowel function  - document stool count   - encourage ambulation / OOBTC  - zofran prn  - DVT ppx  -Reconsult PRN   75y.o. Male with partial SBO, coffee ground emesis likely secondary to enteritis  VSS, leukocytosis resolved     Plan  - f/u SBS  - Can adavnce diet as tolerated and according to bowel function  - document stool count   - encourage ambulation / OOBTC  - zofran prn  - DVT ppx  -Reconsult PRN

## 2025-04-18 NOTE — CHART NOTE - NSCHARTNOTEFT_GEN_A_CORE
-enteric tube tip in stomach noted in small bowel series xray.  - called radiologist and confirmed there is no foreign  body in stomach.

## 2025-04-18 NOTE — PROGRESS NOTE ADULT - SUBJECTIVE AND OBJECTIVE BOX
Patient is a 75y old  Male who presents with a chief complaint of vomiting, diarrhea (18 Apr 2025      INTERVAL HPI/OVERNIGHT EVENTS: Pt stated, " Im feeling better" +BM yesterday, denies nausea and vomiting, abd soft, nontender. tympanic bowel sounds         REVIEW OF SYSTEMS:  CONSTITUTIONAL: No fever, chills  ENMT:  No difficulty hearing, no change in vision  NECK: No pain or stiffness  RESPIRATORY: No cough, SOB  CARDIOVASCULAR: No chest pain, palpitations  GASTROINTESTINAL: No abdominal pain. No nausea, vomiting, or diarrhea  GENITOURINARY: No dysuria  NEUROLOGICAL: No HA  SKIN: No itching, burning, rashes, or lesions   LYMPH NODES: No enlarged glands  ENDOCRINE: No heat or cold intolerance; No hair loss  MUSCULOSKELETAL: No joint pain or swelling; No muscle, back, or extremity pain  PSYCHIATRIC: No depression, anxiety  HEME/LYMPH: No easy bruising, or bleeding gums    T(C): 36.2 (04-18-25 @ 04:40), Max: 36.8 (04-17-25 @ 12:01)  HR: 64 (04-18-25 @ 04:40) (64 - 84)  BP: 116/80 (04-18-25 @ 04:40) (105/66 - 116/80)  RR: 18 (04-18-25 @ 04:40) (18 - 18)  SpO2: 95% (04-18-25 @ 04:40) (91% - 95%)  Wt(kg): --Vital Signs Last 24 Hrs  T(C): 36.2 (18 Apr 2025 04:40), Max: 36.8 (17 Apr 2025 12:01)  T(F): 97.2 (18 Apr 2025 04:40), Max: 98.3 (17 Apr 2025 12:01)  HR: 64 (18 Apr 2025 04:40) (64 - 84)  BP: 116/80 (18 Apr 2025 04:40) (105/66 - 116/80)  BP(mean): 92 (18 Apr 2025 04:40) (92 - 92)  RR: 18 (18 Apr 2025 04:40) (18 - 18)  SpO2: 95% (18 Apr 2025 04:40) (91% - 95%)    Parameters below as of 18 Apr 2025 04:40  Patient On (Oxygen Delivery Method): room air    MEDICATIONS  (STANDING):  ARIPiprazole 15 milliGRAM(s) Oral daily  atorvastatin 10 milliGRAM(s) Oral at bedtime  chlorproMAZINE    Tablet 50 milliGRAM(s) Oral four times a day  clopidogrel Tablet 75 milliGRAM(s) Oral daily  ferrous    sulfate 325 milliGRAM(s) Oral daily  influenza  Vaccine (HIGH DOSE) 0.5 milliLiter(s) IntraMuscular once  lactated ringers. 1000 milliLiter(s) (75 mL/Hr) IV Continuous <Continuous>  lactated ringers. 1000 milliLiter(s) (75 mL/Hr) IV Continuous <Continuous>  lactated ringers. 1000 milliLiter(s) (75 mL/Hr) IV Continuous <Continuous>  losartan 50 milliGRAM(s) Oral daily  metoprolol tartrate 50 milliGRAM(s) Oral two times a day    MEDICATIONS  (PRN):  ondansetron Injectable 4 milliGRAM(s) IV Push every 8 hours PRN Nausea and/or Vomiting      PHYSICAL EXAM:  GENERAL: NAD  EYES: clear conjunctiva; EOMI  ENMT: Moist mucous membranes  NECK: Supple, No JVD, Normal thyroid  CHEST/LUNG: dec breath sounds at bases  HEART: S1, S2, +  ABDOMEN: Soft, Nontender, Nondistended; Bowel sounds present  NEURO: Alert & awake   EXTREMITIES: No LE edema, no calf tenderness  LYMPH: No lymphadenopathy noted  SKIN: No rashes or lesions    Consultant(s) Notes Reviewed:  [x ] YES  [ ] NO  Care Discussed with Consultants/Other Providers [ x] YES  [ ] NO    LABS:                        9.9    6.40  )-----------( 365      ( 18 Apr 2025 06:03 )             31.4     04-18    139  |  109[H]  |  26[H]  ----------------------------<  94  3.5   |  25  |  0.88    Ca    8.1[L]      18 Apr 2025 06:03  Phos  2.3     04-17  Mg     2.1     04-17    TPro  5.8[L]  /  Alb  2.9[L]  /  TBili  0.7  /  DBili  x   /  AST  20  /  ALT  23  /  AlkPhos  42  04-17      CAPILLARY BLOOD GLUCOSE      POCT Blood Glucose.: 92 mg/dL (18 Apr 2025 08:22)  POCT Blood Glucose.: 87 mg/dL (18 Apr 2025 00:21)  POCT Blood Glucose.: 96 mg/dL (17 Apr 2025 21:07)  POCT Blood Glucose.: 122 mg/dL (17 Apr 2025 16:54)  POCT Blood Glucose.: 101 mg/dL (17 Apr 2025 11:36)        Urinalysis Basic - ( 18 Apr 2025 06:03 )    Color: x / Appearance: x / SG: x / pH: x  Gluc: 94 mg/dL / Ketone: x  / Bili: x / Urobili: x   Blood: x / Protein: x / Nitrite: x   Leuk Esterase: x / RBC: x / WBC x   Sq Epi: x / Non Sq Epi: x / Bacteria: x        RADIOLOGY & ADDITIONAL TESTS:    Imaging Personally Reviewed:  [ x] YES  [ ] NO    < from: CT Abdomen and Pelvis w/ IV Cont (04.16.25 @ 05:07) >    ACC: [id number] EXAM:  CT ABDOMEN AND PELVIS IC   ORDERED BY: SADA CUMMINS     PROCEDURE DATE:  04/16/2025          INTERPRETATION:  CLINICAL INFORMATION: Coffee ground emesis    COMPARISON: CT chest 4/6/2023. CT lumbar spine 1/13/2024.    CONTRAST/COMPLICATIONS:  IV Contrast: Omnipaque 350  90 cc administered   10 cc discarded  Oral Contrast: NONE  .    PROCEDURE:  CT of the Abdomen and Pelvis was performed.  Sagittal and coronal reformats were performed.    FINDINGS:  LOWER CHEST: Trace bilateral pleural fluid. Cardiac leads.    LIVER: Within normal limits.  BILE DUCTS: Normal caliber.  GALLBLADDER: Within normal limits.  SPLEEN: Within normal limits.  PANCREAS: Within normal limits.  ADRENALS: Within normal limits.  KIDNEYS/URETERS: Right renal cyst.    BLADDER: Within normal limits.  REPRODUCTIVE ORGANS: Prostate is enlarged.    BOWEL: Hiatal hernia. Thickening of the distal esophagus. Mildly dilated   fluid-filled loops of small bowel measuring up to 3.3 cm in diameter with   a transition point in the mid abdomen (2:84) consistent with at least   partial obstruction. No bowel wall thickening or pneumatosis. Appendix is   normal.  PERITONEUM/RETROPERITONEUM: Within normal limits.  VESSELS: Atherosclerotic changes.  LYMPH NODES: No lymphadenopathy.  ABDOMINAL WALL: Small fat-containing umbilical hernia. Small bilateral   fat-containing inguinal hernias.  BONES: Degenerative changes. Grade 1 retrolisthesis L5 on S1.    IMPRESSION:    Small hiatal hernia. Thickening of the distal esophagus question   esophagitis. This would be better evaluated with endoscopy.    Mildly dilated fluid-filled loops of small bowel measuring up to 3.3 cm   in diameter with a transition point in the mid abdomen (2:84) consistent   with at least partial obstruction. No bowel wall thickening or   pneumatosis.      --- End of Report ---            SELAM SALVADOR MD; Attending Radiologist  This document has been electronically signed. Apr 16 2025  5:27AM    < end of copied text >

## 2025-04-18 NOTE — PROGRESS NOTE ADULT - PROBLEM SELECTOR PLAN 1
-p/w 1 day hx of vomiting and diarrhea- resolved now   -CT a/p: Mildly dilated fluid-filled loops of small bowel measuring up to 3.3 cm in diameter with a transition point in the mid abdomen (2:84) consistent with at least partial obstruction. No bowel wall thickening or pneumatosis.  - Pt refused NGT - to surgery   - Per surgery, keep Pt NPO   - cont stool count   - f/u small bowel series - was done yesterday- f/u results   - advance diet as tolerated, clears today   - GI - no intervention   - surgery following- f/u for further recs

## 2025-04-18 NOTE — PROGRESS NOTE ADULT - PROBLEM SELECTOR PLAN 2
-afebrile and no clear source of infection  - leukocytosis resolved   -c/w maintenance fluids   -BCx negative

## 2025-04-18 NOTE — PROGRESS NOTE ADULT - SUBJECTIVE AND OBJECTIVE BOX
INTERVAL HPI/OVERNIGHT EVENTS:  Pt resting comfortably. No acute complaints overnight. Pt admits to improved nausea. Denies passing flatus, but has had 2-3 multiple loose BM this AM. No vomiting, fever, chills.     MEDICATIONS  (STANDING):  ARIPiprazole 15 milliGRAM(s) Oral daily  atorvastatin 10 milliGRAM(s) Oral at bedtime  chlorproMAZINE    Tablet 50 milliGRAM(s) Oral four times a day  clopidogrel Tablet 75 milliGRAM(s) Oral daily  ferrous    sulfate 325 milliGRAM(s) Oral daily  influenza  Vaccine (HIGH DOSE) 0.5 milliLiter(s) IntraMuscular once  lactated ringers. 1000 milliLiter(s) (75 mL/Hr) IV Continuous <Continuous>  lactated ringers. 1000 milliLiter(s) (75 mL/Hr) IV Continuous <Continuous>  lactated ringers. 1000 milliLiter(s) (75 mL/Hr) IV Continuous <Continuous>  losartan 50 milliGRAM(s) Oral daily  metoprolol tartrate 50 milliGRAM(s) Oral two times a day    MEDICATIONS  (PRN):  ondansetron Injectable 4 milliGRAM(s) IV Push every 8 hours PRN Nausea and/or Vomiting      Vital Signs Last 24 Hrs  T(C): 36.2 (18 Apr 2025 04:40), Max: 36.8 (17 Apr 2025 12:01)  T(F): 97.2 (18 Apr 2025 04:40), Max: 98.3 (17 Apr 2025 12:01)  HR: 64 (18 Apr 2025 04:40) (64 - 84)  BP: 116/80 (18 Apr 2025 04:40) (105/66 - 116/80)  BP(mean): 92 (18 Apr 2025 04:40) (92 - 92)  RR: 18 (18 Apr 2025 04:40) (18 - 18)  SpO2: 95% (18 Apr 2025 04:40) (91% - 95%)    Parameters below as of 18 Apr 2025 04:40  Patient On (Oxygen Delivery Method): room air      Physical:  General: NAD.   Resp: Unlabored breathing. Equal chest rise bilaterally.   Abdomen: Obese, Softly distended, nontender to palpation diffusely. +tympanic percussion. No voluntary guarding, no rebound tenderness, no palpable masses. No peritoneal signs.   Skin: Warm and dry.     I&O's Detail    17 Apr 2025 07:01  -  18 Apr 2025 07:00  --------------------------------------------------------  IN:  Total IN: 0 mL    OUT:    Voided (mL): 600 mL  Total OUT: 600 mL    Total NET: -600 mL          LABS:                        9.9    6.40  )-----------( 365      ( 18 Apr 2025 06:03 )             31.4             04-18    139  |  109[H]  |  26[H]  ----------------------------<  94  3.5   |  25  |  0.88    Ca    8.1[L]      18 Apr 2025 06:03  Phos  2.3     04-17  Mg     2.1     04-17    TPro  5.8[L]  /  Alb  2.9[L]  /  TBili  0.7  /  DBili  x   /  AST  20  /  ALT  23  /  AlkPhos  42  04-17      75y.o. Male

## 2025-04-18 NOTE — PROGRESS NOTE ADULT - PROBLEM SELECTOR PLAN 2
-afebrile and no clear source of infection  - leukocytosis resolved   -c/w maintenance fluids 75 ccs x 12 hrs   -BCx negative -afebrile and no clear source of infection  - leukocytosis resolved   -c/w maintenance fluids   -BCx negative

## 2025-04-18 NOTE — PROGRESS NOTE ADULT - ASSESSMENT
75M from [facility], ambulates with a cane, w/PMH of schizophrenia, HTN, HLD, AICD, and DM presents w/1 day hx of vomiting and diarrhea. Found to have large episode of coffee ground emesis in ED. CT a/p: partial SBO. Admitted to medicine for partial SBO and coffee ground emesis r/o GIB. GI and surgery following.

## 2025-04-18 NOTE — PROGRESS NOTE ADULT - SUBJECTIVE AND OBJECTIVE BOX
Patient is a 75y old  Male who presents with a chief complaint of vomiting, diarrhea (18 Apr 2025 07:55)      INTERVAL HPI/OVERNIGHT EVENTS: Pt stated, " Im feeling better" +BM yesterday, denies nausea and vomiting, abd soft, nontender. tymphanic bowel sounds         REVIEW OF SYSTEMS:  CONSTITUTIONAL: No fever, chills  ENMT:  No difficulty hearing, no change in vision  NECK: No pain or stiffness  RESPIRATORY: No cough, SOB  CARDIOVASCULAR: No chest pain, palpitations  GASTROINTESTINAL: No abdominal pain. No nausea, vomiting, or diarrhea  GENITOURINARY: No dysuria  NEUROLOGICAL: No HA  SKIN: No itching, burning, rashes, or lesions   LYMPH NODES: No enlarged glands  ENDOCRINE: No heat or cold intolerance; No hair loss  MUSCULOSKELETAL: No joint pain or swelling; No muscle, back, or extremity pain  PSYCHIATRIC: No depression, anxiety  HEME/LYMPH: No easy bruising, or bleeding gums    T(C): 36.2 (04-18-25 @ 04:40), Max: 36.8 (04-17-25 @ 12:01)  HR: 64 (04-18-25 @ 04:40) (64 - 84)  BP: 116/80 (04-18-25 @ 04:40) (105/66 - 116/80)  RR: 18 (04-18-25 @ 04:40) (18 - 18)  SpO2: 95% (04-18-25 @ 04:40) (91% - 95%)  Wt(kg): --Vital Signs Last 24 Hrs  T(C): 36.2 (18 Apr 2025 04:40), Max: 36.8 (17 Apr 2025 12:01)  T(F): 97.2 (18 Apr 2025 04:40), Max: 98.3 (17 Apr 2025 12:01)  HR: 64 (18 Apr 2025 04:40) (64 - 84)  BP: 116/80 (18 Apr 2025 04:40) (105/66 - 116/80)  BP(mean): 92 (18 Apr 2025 04:40) (92 - 92)  RR: 18 (18 Apr 2025 04:40) (18 - 18)  SpO2: 95% (18 Apr 2025 04:40) (91% - 95%)    Parameters below as of 18 Apr 2025 04:40  Patient On (Oxygen Delivery Method): room air    MEDICATIONS  (STANDING):  ARIPiprazole 15 milliGRAM(s) Oral daily  atorvastatin 10 milliGRAM(s) Oral at bedtime  chlorproMAZINE    Tablet 50 milliGRAM(s) Oral four times a day  clopidogrel Tablet 75 milliGRAM(s) Oral daily  ferrous    sulfate 325 milliGRAM(s) Oral daily  influenza  Vaccine (HIGH DOSE) 0.5 milliLiter(s) IntraMuscular once  lactated ringers. 1000 milliLiter(s) (75 mL/Hr) IV Continuous <Continuous>  lactated ringers. 1000 milliLiter(s) (75 mL/Hr) IV Continuous <Continuous>  lactated ringers. 1000 milliLiter(s) (75 mL/Hr) IV Continuous <Continuous>  losartan 50 milliGRAM(s) Oral daily  metoprolol tartrate 50 milliGRAM(s) Oral two times a day    MEDICATIONS  (PRN):  ondansetron Injectable 4 milliGRAM(s) IV Push every 8 hours PRN Nausea and/or Vomiting      PHYSICAL EXAM:  GENERAL: NAD  EYES: clear conjunctiva; EOMI  ENMT: Moist mucous membranes  NECK: Supple, No JVD, Normal thyroid  CHEST/LUNG: Diminished at bases  auscultation bilaterally; No rales, rhonchi, wheezing, or rubs  HEART: S1, S2, Regular rate and rhythm  ABDOMEN: Soft, Nontender, Nondistended; Bowel sounds present  NEURO: Alert & awake   EXTREMITIES: No LE edema, no calf tenderness  LYMPH: No lymphadenopathy noted  SKIN: No rashes or lesions    Consultant(s) Notes Reviewed:  [x ] YES  [ ] NO  Care Discussed with Consultants/Other Providers [ x] YES  [ ] NO    LABS:                        9.9    6.40  )-----------( 365      ( 18 Apr 2025 06:03 )             31.4     04-18    139  |  109[H]  |  26[H]  ----------------------------<  94  3.5   |  25  |  0.88    Ca    8.1[L]      18 Apr 2025 06:03  Phos  2.3     04-17  Mg     2.1     04-17    TPro  5.8[L]  /  Alb  2.9[L]  /  TBili  0.7  /  DBili  x   /  AST  20  /  ALT  23  /  AlkPhos  42  04-17      CAPILLARY BLOOD GLUCOSE      POCT Blood Glucose.: 92 mg/dL (18 Apr 2025 08:22)  POCT Blood Glucose.: 87 mg/dL (18 Apr 2025 00:21)  POCT Blood Glucose.: 96 mg/dL (17 Apr 2025 21:07)  POCT Blood Glucose.: 122 mg/dL (17 Apr 2025 16:54)  POCT Blood Glucose.: 101 mg/dL (17 Apr 2025 11:36)        Urinalysis Basic - ( 18 Apr 2025 06:03 )    Color: x / Appearance: x / SG: x / pH: x  Gluc: 94 mg/dL / Ketone: x  / Bili: x / Urobili: x   Blood: x / Protein: x / Nitrite: x   Leuk Esterase: x / RBC: x / WBC x   Sq Epi: x / Non Sq Epi: x / Bacteria: x        RADIOLOGY & ADDITIONAL TESTS:    Imaging Personally Reviewed:  [ x] YES  [ ] NO    < from: CT Abdomen and Pelvis w/ IV Cont (04.16.25 @ 05:07) >    ACC: [id number] EXAM:  CT ABDOMEN AND PELVIS IC   ORDERED BY: SADA CUMMINS     PROCEDURE DATE:  04/16/2025          INTERPRETATION:  CLINICAL INFORMATION: Coffee ground emesis    COMPARISON: CT chest 4/6/2023. CT lumbar spine 1/13/2024.    CONTRAST/COMPLICATIONS:  IV Contrast: Omnipaque 350  90 cc administered   10 cc discarded  Oral Contrast: NONE  .    PROCEDURE:  CT of the Abdomen and Pelvis was performed.  Sagittal and coronal reformats were performed.    FINDINGS:  LOWER CHEST: Trace bilateral pleural fluid. Cardiac leads.    LIVER: Within normal limits.  BILE DUCTS: Normal caliber.  GALLBLADDER: Within normal limits.  SPLEEN: Within normal limits.  PANCREAS: Within normal limits.  ADRENALS: Within normal limits.  KIDNEYS/URETERS: Right renal cyst.    BLADDER: Within normal limits.  REPRODUCTIVE ORGANS: Prostate is enlarged.    BOWEL: Hiatal hernia. Thickening of the distal esophagus. Mildly dilated   fluid-filled loops of small bowel measuring up to 3.3 cm in diameter with   a transition point in the mid abdomen (2:84) consistent with at least   partial obstruction. No bowel wall thickening or pneumatosis. Appendix is   normal.  PERITONEUM/RETROPERITONEUM: Within normal limits.  VESSELS: Atherosclerotic changes.  LYMPH NODES: No lymphadenopathy.  ABDOMINAL WALL: Small fat-containing umbilical hernia. Small bilateral   fat-containing inguinal hernias.  BONES: Degenerative changes. Grade 1 retrolisthesis L5 on S1.    IMPRESSION:    Small hiatal hernia. Thickening of the distal esophagus question   esophagitis. This would be better evaluated with endoscopy.    Mildly dilated fluid-filled loops of small bowel measuring up to 3.3 cm   in diameter with a transition point in the mid abdomen (2:84) consistent   with at least partial obstruction. No bowel wall thickening or   pneumatosis.      --- End of Report ---            SELAM SALVADOR MD; Attending Radiologist  This document has been electronically signed. Apr 16 2025  5:27AM    < end of copied text >       Patient is a 75y old  Male who presents with a chief complaint of vomiting, diarrhea (18 Apr 2025 07:55)      INTERVAL HPI/OVERNIGHT EVENTS: Pt stated, " Im feeling better" +BM yesterday, denies nausea and vomiting, abd soft, nontender. tympanic bowel sounds         REVIEW OF SYSTEMS:  CONSTITUTIONAL: No fever, chills  ENMT:  No difficulty hearing, no change in vision  NECK: No pain or stiffness  RESPIRATORY: No cough, SOB  CARDIOVASCULAR: No chest pain, palpitations  GASTROINTESTINAL: No abdominal pain. No nausea, vomiting, or diarrhea  GENITOURINARY: No dysuria  NEUROLOGICAL: No HA  SKIN: No itching, burning, rashes, or lesions   LYMPH NODES: No enlarged glands  ENDOCRINE: No heat or cold intolerance; No hair loss  MUSCULOSKELETAL: No joint pain or swelling; No muscle, back, or extremity pain  PSYCHIATRIC: No depression, anxiety  HEME/LYMPH: No easy bruising, or bleeding gums    T(C): 36.2 (04-18-25 @ 04:40), Max: 36.8 (04-17-25 @ 12:01)  HR: 64 (04-18-25 @ 04:40) (64 - 84)  BP: 116/80 (04-18-25 @ 04:40) (105/66 - 116/80)  RR: 18 (04-18-25 @ 04:40) (18 - 18)  SpO2: 95% (04-18-25 @ 04:40) (91% - 95%)  Wt(kg): --Vital Signs Last 24 Hrs  T(C): 36.2 (18 Apr 2025 04:40), Max: 36.8 (17 Apr 2025 12:01)  T(F): 97.2 (18 Apr 2025 04:40), Max: 98.3 (17 Apr 2025 12:01)  HR: 64 (18 Apr 2025 04:40) (64 - 84)  BP: 116/80 (18 Apr 2025 04:40) (105/66 - 116/80)  BP(mean): 92 (18 Apr 2025 04:40) (92 - 92)  RR: 18 (18 Apr 2025 04:40) (18 - 18)  SpO2: 95% (18 Apr 2025 04:40) (91% - 95%)    Parameters below as of 18 Apr 2025 04:40  Patient On (Oxygen Delivery Method): room air    MEDICATIONS  (STANDING):  ARIPiprazole 15 milliGRAM(s) Oral daily  atorvastatin 10 milliGRAM(s) Oral at bedtime  chlorproMAZINE    Tablet 50 milliGRAM(s) Oral four times a day  clopidogrel Tablet 75 milliGRAM(s) Oral daily  ferrous    sulfate 325 milliGRAM(s) Oral daily  influenza  Vaccine (HIGH DOSE) 0.5 milliLiter(s) IntraMuscular once  lactated ringers. 1000 milliLiter(s) (75 mL/Hr) IV Continuous <Continuous>  lactated ringers. 1000 milliLiter(s) (75 mL/Hr) IV Continuous <Continuous>  lactated ringers. 1000 milliLiter(s) (75 mL/Hr) IV Continuous <Continuous>  losartan 50 milliGRAM(s) Oral daily  metoprolol tartrate 50 milliGRAM(s) Oral two times a day    MEDICATIONS  (PRN):  ondansetron Injectable 4 milliGRAM(s) IV Push every 8 hours PRN Nausea and/or Vomiting      PHYSICAL EXAM:  GENERAL: NAD  EYES: clear conjunctiva; EOMI  ENMT: Moist mucous membranes  NECK: Supple, No JVD, Normal thyroid  CHEST/LUNG: Diminished at bases  auscultation bilaterally; No rales, rhonchi, wheezing, or rubs  HEART: S1, S2, Regular rate and rhythm  ABDOMEN: Soft, Nontender, Nondistended; Bowel sounds present  NEURO: Alert & awake   EXTREMITIES: No LE edema, no calf tenderness  LYMPH: No lymphadenopathy noted  SKIN: No rashes or lesions    Consultant(s) Notes Reviewed:  [x ] YES  [ ] NO  Care Discussed with Consultants/Other Providers [ x] YES  [ ] NO    LABS:                        9.9    6.40  )-----------( 365      ( 18 Apr 2025 06:03 )             31.4     04-18    139  |  109[H]  |  26[H]  ----------------------------<  94  3.5   |  25  |  0.88    Ca    8.1[L]      18 Apr 2025 06:03  Phos  2.3     04-17  Mg     2.1     04-17    TPro  5.8[L]  /  Alb  2.9[L]  /  TBili  0.7  /  DBili  x   /  AST  20  /  ALT  23  /  AlkPhos  42  04-17      CAPILLARY BLOOD GLUCOSE      POCT Blood Glucose.: 92 mg/dL (18 Apr 2025 08:22)  POCT Blood Glucose.: 87 mg/dL (18 Apr 2025 00:21)  POCT Blood Glucose.: 96 mg/dL (17 Apr 2025 21:07)  POCT Blood Glucose.: 122 mg/dL (17 Apr 2025 16:54)  POCT Blood Glucose.: 101 mg/dL (17 Apr 2025 11:36)        Urinalysis Basic - ( 18 Apr 2025 06:03 )    Color: x / Appearance: x / SG: x / pH: x  Gluc: 94 mg/dL / Ketone: x  / Bili: x / Urobili: x   Blood: x / Protein: x / Nitrite: x   Leuk Esterase: x / RBC: x / WBC x   Sq Epi: x / Non Sq Epi: x / Bacteria: x        RADIOLOGY & ADDITIONAL TESTS:    Imaging Personally Reviewed:  [ x] YES  [ ] NO    < from: CT Abdomen and Pelvis w/ IV Cont (04.16.25 @ 05:07) >    ACC: [id number] EXAM:  CT ABDOMEN AND PELVIS IC   ORDERED BY: SADA CUMMINS     PROCEDURE DATE:  04/16/2025          INTERPRETATION:  CLINICAL INFORMATION: Coffee ground emesis    COMPARISON: CT chest 4/6/2023. CT lumbar spine 1/13/2024.    CONTRAST/COMPLICATIONS:  IV Contrast: Omnipaque 350  90 cc administered   10 cc discarded  Oral Contrast: NONE  .    PROCEDURE:  CT of the Abdomen and Pelvis was performed.  Sagittal and coronal reformats were performed.    FINDINGS:  LOWER CHEST: Trace bilateral pleural fluid. Cardiac leads.    LIVER: Within normal limits.  BILE DUCTS: Normal caliber.  GALLBLADDER: Within normal limits.  SPLEEN: Within normal limits.  PANCREAS: Within normal limits.  ADRENALS: Within normal limits.  KIDNEYS/URETERS: Right renal cyst.    BLADDER: Within normal limits.  REPRODUCTIVE ORGANS: Prostate is enlarged.    BOWEL: Hiatal hernia. Thickening of the distal esophagus. Mildly dilated   fluid-filled loops of small bowel measuring up to 3.3 cm in diameter with   a transition point in the mid abdomen (2:84) consistent with at least   partial obstruction. No bowel wall thickening or pneumatosis. Appendix is   normal.  PERITONEUM/RETROPERITONEUM: Within normal limits.  VESSELS: Atherosclerotic changes.  LYMPH NODES: No lymphadenopathy.  ABDOMINAL WALL: Small fat-containing umbilical hernia. Small bilateral   fat-containing inguinal hernias.  BONES: Degenerative changes. Grade 1 retrolisthesis L5 on S1.    IMPRESSION:    Small hiatal hernia. Thickening of the distal esophagus question   esophagitis. This would be better evaluated with endoscopy.    Mildly dilated fluid-filled loops of small bowel measuring up to 3.3 cm   in diameter with a transition point in the mid abdomen (2:84) consistent   with at least partial obstruction. No bowel wall thickening or   pneumatosis.      --- End of Report ---            SELAM SALVADOR MD; Attending Radiologist  This document has been electronically signed. Apr 16 2025  5:27AM    < end of copied text >

## 2025-04-18 NOTE — PROGRESS NOTE ADULT - NS ATTEND AMEND GEN_ALL_CORE FT
75F with no prior abdominal surgery presents with nausea vomiting, possible hematemesis (although patient denies this) and copious amounts of diarrhea (last one last night). Patient has extensive medical/cardiac hx. Patient had a CT scan showing SBO with transition point as well as hiatal hernia with possible esophagitis.     On exam he is aox3, softly distended with tympany, no bowel function today, no nausea or vomiting. wbc 12k, remains afebrile    1. Small bowel obstruction  - small bowel series with contrast in colon, patient also having BMs, no SBO, advance diet as tolerated, will sign off

## 2025-04-19 RX ADMIN — Medication 50 MILLIGRAM(S): at 05:48

## 2025-04-19 RX ADMIN — METOPROLOL SUCCINATE 50 MILLIGRAM(S): 50 TABLET, EXTENDED RELEASE ORAL at 17:58

## 2025-04-19 RX ADMIN — ATORVASTATIN CALCIUM 10 MILLIGRAM(S): 80 TABLET, FILM COATED ORAL at 22:07

## 2025-04-19 RX ADMIN — Medication 50 MILLIGRAM(S): at 17:58

## 2025-04-19 RX ADMIN — Medication 50 MILLIGRAM(S): at 23:48

## 2025-04-19 RX ADMIN — Medication 50 MILLIGRAM(S): at 11:26

## 2025-04-19 NOTE — PROGRESS NOTE ADULT - SUBJECTIVE AND OBJECTIVE BOX
Patient is a 75y old  Male who presents with a chief complaint of vomiting, diarrhea     INTERVAL HPI/OVERNIGHT EVENTS: Pt stated, " Im feeling better" +BM yesterday, denies nausea and vomiting, abd soft, nontender. tympanic bowel sounds         REVIEW OF SYSTEMS:  CONSTITUTIONAL: No fever, chills  ENMT:  No difficulty hearing, no change in vision  NECK: No pain or stiffness  RESPIRATORY: No cough, SOB  CARDIOVASCULAR: No chest pain, palpitations  GASTROINTESTINAL: No abdominal pain. No nausea, vomiting, or diarrhea  GENITOURINARY: No dysuria  NEUROLOGICAL: No HA  SKIN: No itching, burning, rashes, or lesions   LYMPH NODES: No enlarged glands  ENDOCRINE: No heat or cold intolerance; No hair loss  MUSCULOSKELETAL: No joint pain or swelling; No muscle, back, or extremity pain  PSYCHIATRIC: No depression, anxiety  HEME/LYMPH: No easy bruising, or bleeding gums    MEDICATIONS  (STANDING):  ARIPiprazole 15 milliGRAM(s) Oral daily  atorvastatin 10 milliGRAM(s) Oral at bedtime  chlorproMAZINE    Tablet 50 milliGRAM(s) Oral four times a day  clopidogrel Tablet 75 milliGRAM(s) Oral daily  ferrous    sulfate 325 milliGRAM(s) Oral daily  influenza  Vaccine (HIGH DOSE) 0.5 milliLiter(s) IntraMuscular once  losartan 50 milliGRAM(s) Oral daily  metoprolol tartrate 50 milliGRAM(s) Oral two times a day    MEDICATIONS  (PRN):  ondansetron Injectable 4 milliGRAM(s) IV Push every 8 hours PRN Nausea and/or Vomiting    Vital Signs Last 24 Hrs  T(C): 36.2 (04-19-25 @ 20:31), Max: 36.4 (04-19-25 @ 05:15)  T(F): 97.2 (04-19-25 @ 20:31), Max: 97.6 (04-19-25 @ 05:15)  HR: 60 (04-19-25 @ 20:31) (60 - 72)  BP: 145/81 (04-19-25 @ 20:31) (119/67 - 158/81)  BP(mean): --  RR: 17 (04-19-25 @ 20:31) (17 - 18)  SpO2: 96% (04-19-25 @ 20:31) (94% - 96%)        PHYSICAL EXAM:  GENERAL: NAD  EYES: clear conjunctiva; EOMI  ENMT: Moist mucous membranes  NECK: Supple, No JVD, Normal thyroid  CHEST/LUNG: dec breath sounds at bases  HEART: S1, S2, +  ABDOMEN: Soft, Nontender, Nondistended; Bowel sounds present  NEURO: Alert & awake   EXTREMITIES: No LE edema, no calf tenderness  LYMPH: No lymphadenopathy noted  SKIN: No rashes or lesions    Consultant(s) Notes Reviewed:  [x ] YES  [ ] NO  Care Discussed with Consultants/Other Providers [ x] YES  [ ] NO    LABS:  04-18    139  |  109[H]  |  26[H]  ----------------------------<  94  3.5   |  25  |  0.88    Ca    8.1[L]      18 Apr 2025 06:03      Creatinine Trend: 0.88 <--, 0.87 <--, 1.16 <--                        9.9    6.40  )-----------( 365      ( 18 Apr 2025 06:03 )             31.4     Urine Studies:  Urinalysis Basic - ( 18 Apr 2025 06:03 )    Color:  / Appearance:  / SG:  / pH:   Gluc: 94 mg/dL / Ketone:   / Bili:  / Urobili:    Blood:  / Protein:  / Nitrite:    Leuk Esterase:  / RBC:  / WBC    Sq Epi:  / Non Sq Epi:  / Bacteria:                       RADIOLOGY & ADDITIONAL TESTS:    Imaging Personally Reviewed:  [ x] YES  [ ] NO    < from: CT Abdomen and Pelvis w/ IV Cont (04.16.25 @ 05:07) >    ACC: [id number] EXAM:  CT ABDOMEN AND PELVIS IC   ORDERED BY: SADA CUMMINS     PROCEDURE DATE:  04/16/2025          INTERPRETATION:  CLINICAL INFORMATION: Coffee ground emesis    COMPARISON: CT chest 4/6/2023. CT lumbar spine 1/13/2024.    CONTRAST/COMPLICATIONS:  IV Contrast: Omnipaque 350  90 cc administered   10 cc discarded  Oral Contrast: NONE  .    PROCEDURE:  CT of the Abdomen and Pelvis was performed.  Sagittal and coronal reformats were performed.    FINDINGS:  LOWER CHEST: Trace bilateral pleural fluid. Cardiac leads.    LIVER: Within normal limits.  BILE DUCTS: Normal caliber.  GALLBLADDER: Within normal limits.  SPLEEN: Within normal limits.  PANCREAS: Within normal limits.  ADRENALS: Within normal limits.  KIDNEYS/URETERS: Right renal cyst.    BLADDER: Within normal limits.  REPRODUCTIVE ORGANS: Prostate is enlarged.    BOWEL: Hiatal hernia. Thickening of the distal esophagus. Mildly dilated   fluid-filled loops of small bowel measuring up to 3.3 cm in diameter with   a transition point in the mid abdomen (2:84) consistent with at least   partial obstruction. No bowel wall thickening or pneumatosis. Appendix is   normal.  PERITONEUM/RETROPERITONEUM: Within normal limits.  VESSELS: Atherosclerotic changes.  LYMPH NODES: No lymphadenopathy.  ABDOMINAL WALL: Small fat-containing umbilical hernia. Small bilateral   fat-containing inguinal hernias.  BONES: Degenerative changes. Grade 1 retrolisthesis L5 on S1.    IMPRESSION:    Small hiatal hernia. Thickening of the distal esophagus question   esophagitis. This would be better evaluated with endoscopy.    Mildly dilated fluid-filled loops of small bowel measuring up to 3.3 cm   in diameter with a transition point in the mid abdomen (2:84) consistent   with at least partial obstruction. No bowel wall thickening or   pneumatosis.      --- End of Report ---            SELAM SALVADOR MD; Attending Radiologist  This document has been electronically signed. Apr 16 2025  5:27AM    < end of copied text >

## 2025-04-19 NOTE — PROGRESS NOTE ADULT - ASSESSMENT
75M from [facility], ambulates with a cane, w/PMH of schizophrenia, HTN, HLD, AICD, and DM presents w/1 day hx of vomiting and diarrhea. Found to have large episode of coffee ground emesis in ED. CT a/p: partial SBO. Admitted to medicine for partial SBO and coffee ground emesis r/o GIB. GI and surgery following.

## 2025-04-20 LAB
ANION GAP SERPL CALC-SCNC: 7 MMOL/L — SIGNIFICANT CHANGE UP (ref 5–17)
BUN SERPL-MCNC: 8 MG/DL — SIGNIFICANT CHANGE UP (ref 7–18)
CALCIUM SERPL-MCNC: 8.1 MG/DL — LOW (ref 8.4–10.5)
CHLORIDE SERPL-SCNC: 107 MMOL/L — SIGNIFICANT CHANGE UP (ref 96–108)
CO2 SERPL-SCNC: 24 MMOL/L — SIGNIFICANT CHANGE UP (ref 22–31)
CREAT SERPL-MCNC: 0.83 MG/DL — SIGNIFICANT CHANGE UP (ref 0.5–1.3)
EGFR: 91 ML/MIN/1.73M2 — SIGNIFICANT CHANGE UP
EGFR: 91 ML/MIN/1.73M2 — SIGNIFICANT CHANGE UP
GLUCOSE BLDC GLUCOMTR-MCNC: 109 MG/DL — HIGH (ref 70–99)
GLUCOSE SERPL-MCNC: 95 MG/DL — SIGNIFICANT CHANGE UP (ref 70–99)
HCT VFR BLD CALC: 32.5 % — LOW (ref 39–50)
HGB BLD-MCNC: 10.2 G/DL — LOW (ref 13–17)
MCHC RBC-ENTMCNC: 20 PG — LOW (ref 27–34)
MCHC RBC-ENTMCNC: 31.4 G/DL — LOW (ref 32–36)
MCV RBC AUTO: 63.6 FL — LOW (ref 80–100)
NRBC BLD AUTO-RTO: 0 /100 WBCS — SIGNIFICANT CHANGE UP (ref 0–0)
PLATELET # BLD AUTO: 421 K/UL — HIGH (ref 150–400)
POTASSIUM SERPL-MCNC: 3.3 MMOL/L — LOW (ref 3.5–5.3)
POTASSIUM SERPL-SCNC: 3.3 MMOL/L — LOW (ref 3.5–5.3)
RBC # BLD: 5.11 M/UL — SIGNIFICANT CHANGE UP (ref 4.2–5.8)
RBC # FLD: 16.1 % — HIGH (ref 10.3–14.5)
SODIUM SERPL-SCNC: 138 MMOL/L — SIGNIFICANT CHANGE UP (ref 135–145)
WBC # BLD: 4.77 K/UL — SIGNIFICANT CHANGE UP (ref 3.8–10.5)
WBC # FLD AUTO: 4.77 K/UL — SIGNIFICANT CHANGE UP (ref 3.8–10.5)

## 2025-04-20 RX ADMIN — LOSARTAN POTASSIUM 50 MILLIGRAM(S): 100 TABLET, FILM COATED ORAL at 05:48

## 2025-04-20 RX ADMIN — Medication 50 MILLIGRAM(S): at 05:47

## 2025-04-20 RX ADMIN — Medication 50 MILLIGRAM(S): at 17:27

## 2025-04-20 RX ADMIN — Medication 50 MILLIGRAM(S): at 12:06

## 2025-04-20 RX ADMIN — METOPROLOL SUCCINATE 50 MILLIGRAM(S): 50 TABLET, EXTENDED RELEASE ORAL at 17:27

## 2025-04-20 RX ADMIN — METOPROLOL SUCCINATE 50 MILLIGRAM(S): 50 TABLET, EXTENDED RELEASE ORAL at 05:48

## 2025-04-20 RX ADMIN — ATORVASTATIN CALCIUM 10 MILLIGRAM(S): 80 TABLET, FILM COATED ORAL at 21:30

## 2025-04-20 NOTE — PROGRESS NOTE ADULT - PROBLEM SELECTOR PLAN 1
-p/w 1 day hx of vomiting and diarrhea- resolved now   -CT a/p: Mildly dilated fluid-filled loops of small bowel measuring up to 3.3 cm in diameter with a transition point in the mid abdomen (2:84) consistent with at least partial obstruction. No bowel wall thickening or pneumatosis.  --as per surgery,  f/u SBS  - Can adavnce diet as tolerated and according to bowel function  - document stool count   - encourage ambulation / OOBTC  - zofran prn  - DVT ppx

## 2025-04-20 NOTE — PROGRESS NOTE ADULT - SUBJECTIVE AND OBJECTIVE BOX
Patient is a 75y old  Male who presents with a chief complaint of vomiting, diarrhea     INTERVAL HPI/OVERNIGHT EVENTS: pt seen and examined    MEDICATIONS  (STANDING):  ARIPiprazole 15 milliGRAM(s) Oral daily  atorvastatin 10 milliGRAM(s) Oral at bedtime  chlorproMAZINE    Tablet 50 milliGRAM(s) Oral four times a day  clopidogrel Tablet 75 milliGRAM(s) Oral daily  ferrous    sulfate 325 milliGRAM(s) Oral daily  influenza  Vaccine (HIGH DOSE) 0.5 milliLiter(s) IntraMuscular once  losartan 50 milliGRAM(s) Oral daily  metoprolol tartrate 50 milliGRAM(s) Oral two times a day    MEDICATIONS  (PRN):  ondansetron Injectable 4 milliGRAM(s) IV Push every 8 hours PRN Nausea and/or Vomiting    Vital Signs Last 24 Hrs  T(C): 36.4 (04-20-25 @ 12:51), Max: 36.6 (04-20-25 @ 05:33)  T(F): 97.5 (04-20-25 @ 12:51), Max: 97.9 (04-20-25 @ 05:33)  HR: 60 (04-20-25 @ 16:28) (60 - 65)  BP: 127/80 (04-20-25 @ 16:28) (127/80 - 146/81)  BP(mean): 99 (04-20-25 @ 12:51) (99 - 99)  RR: 18 (04-20-25 @ 12:51) (18 - 18)  SpO2: 96% (04-20-25 @ 12:51) (96% - 97%)      REVIEW OF SYSTEMS:  CONSTITUTIONAL: No fever, chills  ENMT:  No difficulty hearing, no change in vision  NECK: No pain or stiffness  RESPIRATORY: No cough, SOB  CARDIOVASCULAR: No chest pain, palpitations  GASTROINTESTINAL: No abdominal pain. No nausea, vomiting, or diarrhea  GENITOURINARY: No dysuria  NEUROLOGICAL: No HA  SKIN: No itching, burning, rashes, or lesions   LYMPH NODES: No enlarged glands  ENDOCRINE: No heat or cold intolerance; No hair loss  MUSCULOSKELETAL: No joint pain or swelling; No muscle, back, or extremity pain  PSYCHIATRIC: No depression, anxiety  HEME/LYMPH: No easy bruising, or bleeding gums        PHYSICAL EXAM:  GENERAL: NAD  EYES: clear conjunctiva; EOMI  ENMT: Moist mucous membranes  NECK: Supple, No JVD, Normal thyroid  CHEST/LUNG: dec breath sounds at bases  HEART: S1, S2, +  ABDOMEN: Soft, Nontender, Nondistended; Bowel sounds present  NEURO: Alert & awake   EXTREMITIES: No LE edema, no calf tenderness  LYMPH: No lymphadenopathy noted  SKIN: No rashes or lesions    Consultant(s) Notes Reviewed:  [x ] YES  [ ] NO  Care Discussed with Consultants/Other Providers [ x] YES  [ ] NO    LABS:  04-20    138  |  107  |  8   ----------------------------<  95  3.3[L]   |  24  |  0.83    Ca    8.1[L]      20 Apr 2025 07:48      Creatinine Trend: 0.83 <--, 0.88 <--, 0.87 <--, 1.16 <--                        10.2   4.77  )-----------( 421      ( 20 Apr 2025 07:48 )             32.5     Urine Studies:  Urinalysis Basic - ( 20 Apr 2025 07:48 )    Color:  / Appearance:  / SG:  / pH:   Gluc: 95 mg/dL / Ketone:   / Bili:  / Urobili:    Blood:  / Protein:  / Nitrite:    Leuk Esterase:  / RBC:  / WBC    Sq Epi:  / Non Sq Epi:  / Bacteria:                               RADIOLOGY & ADDITIONAL TESTS:    Imaging Personally Reviewed:  [ x] YES  [ ] NO    < from: CT Abdomen and Pelvis w/ IV Cont (04.16.25 @ 05:07) >    ACC: [id number] EXAM:  CT ABDOMEN AND PELVIS IC   ORDERED BY: SADA CUMMINS     PROCEDURE DATE:  04/16/2025          INTERPRETATION:  CLINICAL INFORMATION: Coffee ground emesis    COMPARISON: CT chest 4/6/2023. CT lumbar spine 1/13/2024.    CONTRAST/COMPLICATIONS:  IV Contrast: Omnipaque 350  90 cc administered   10 cc discarded  Oral Contrast: NONE  .    PROCEDURE:  CT of the Abdomen and Pelvis was performed.  Sagittal and coronal reformats were performed.    FINDINGS:  LOWER CHEST: Trace bilateral pleural fluid. Cardiac leads.    LIVER: Within normal limits.  BILE DUCTS: Normal caliber.  GALLBLADDER: Within normal limits.  SPLEEN: Within normal limits.  PANCREAS: Within normal limits.  ADRENALS: Within normal limits.  KIDNEYS/URETERS: Right renal cyst.    BLADDER: Within normal limits.  REPRODUCTIVE ORGANS: Prostate is enlarged.    BOWEL: Hiatal hernia. Thickening of the distal esophagus. Mildly dilated   fluid-filled loops of small bowel measuring up to 3.3 cm in diameter with   a transition point in the mid abdomen (2:84) consistent with at least   partial obstruction. No bowel wall thickening or pneumatosis. Appendix is   normal.  PERITONEUM/RETROPERITONEUM: Within normal limits.  VESSELS: Atherosclerotic changes.  LYMPH NODES: No lymphadenopathy.  ABDOMINAL WALL: Small fat-containing umbilical hernia. Small bilateral   fat-containing inguinal hernias.  BONES: Degenerative changes. Grade 1 retrolisthesis L5 on S1.    IMPRESSION:    Small hiatal hernia. Thickening of the distal esophagus question   esophagitis. This would be better evaluated with endoscopy.    Mildly dilated fluid-filled loops of small bowel measuring up to 3.3 cm   in diameter with a transition point in the mid abdomen (2:84) consistent   with at least partial obstruction. No bowel wall thickening or   pneumatosis.      --- End of Report ---            SELAM SALVADOR MD; Attending Radiologist  This document has been electronically signed. Apr 16 2025  5:27AM    < end of copied text >

## 2025-04-20 NOTE — PROGRESS NOTE ADULT - ASSESSMENT
75M from [facility], ambulates with a cane, w/PMH of schizophrenia, HTN, HLD, AICD, and DM presents w/1 day hx of vomiting and diarrhea. Found to have large episode of coffee ground emesis in ED. CT a/p: partial SBO. Admitted to medicine for partial SBO and coffee ground emesis r/o GIB. GI and surgery following.

## 2025-04-21 ENCOUNTER — TRANSCRIPTION ENCOUNTER (OUTPATIENT)
Age: 75
End: 2025-04-21

## 2025-04-21 LAB
ANION GAP SERPL CALC-SCNC: 7 MMOL/L — SIGNIFICANT CHANGE UP (ref 5–17)
BUN SERPL-MCNC: 7 MG/DL — SIGNIFICANT CHANGE UP (ref 7–18)
CALCIUM SERPL-MCNC: 8.5 MG/DL — SIGNIFICANT CHANGE UP (ref 8.4–10.5)
CHLORIDE SERPL-SCNC: 108 MMOL/L — SIGNIFICANT CHANGE UP (ref 96–108)
CO2 SERPL-SCNC: 25 MMOL/L — SIGNIFICANT CHANGE UP (ref 22–31)
CREAT SERPL-MCNC: 0.82 MG/DL — SIGNIFICANT CHANGE UP (ref 0.5–1.3)
CULTURE RESULTS: SIGNIFICANT CHANGE UP
CULTURE RESULTS: SIGNIFICANT CHANGE UP
EGFR: 92 ML/MIN/1.73M2 — SIGNIFICANT CHANGE UP
EGFR: 92 ML/MIN/1.73M2 — SIGNIFICANT CHANGE UP
GLUCOSE BLDC GLUCOMTR-MCNC: 108 MG/DL — HIGH (ref 70–99)
GLUCOSE BLDC GLUCOMTR-MCNC: 99 MG/DL — SIGNIFICANT CHANGE UP (ref 70–99)
GLUCOSE SERPL-MCNC: 100 MG/DL — HIGH (ref 70–99)
HCT VFR BLD CALC: 33.2 % — LOW (ref 39–50)
HGB BLD-MCNC: 10.5 G/DL — LOW (ref 13–17)
MCHC RBC-ENTMCNC: 20 PG — LOW (ref 27–34)
MCHC RBC-ENTMCNC: 31.6 G/DL — LOW (ref 32–36)
MCV RBC AUTO: 63.1 FL — LOW (ref 80–100)
NRBC BLD AUTO-RTO: 0 /100 WBCS — SIGNIFICANT CHANGE UP (ref 0–0)
PLATELET # BLD AUTO: 439 K/UL — HIGH (ref 150–400)
POTASSIUM SERPL-MCNC: 3.4 MMOL/L — LOW (ref 3.5–5.3)
POTASSIUM SERPL-SCNC: 3.4 MMOL/L — LOW (ref 3.5–5.3)
RBC # BLD: 5.26 M/UL — SIGNIFICANT CHANGE UP (ref 4.2–5.8)
RBC # FLD: 16.3 % — HIGH (ref 10.3–14.5)
SODIUM SERPL-SCNC: 140 MMOL/L — SIGNIFICANT CHANGE UP (ref 135–145)
SPECIMEN SOURCE: SIGNIFICANT CHANGE UP
SPECIMEN SOURCE: SIGNIFICANT CHANGE UP
WBC # BLD: 5.63 K/UL — SIGNIFICANT CHANGE UP (ref 3.8–10.5)
WBC # FLD AUTO: 5.63 K/UL — SIGNIFICANT CHANGE UP (ref 3.8–10.5)

## 2025-04-21 RX ADMIN — Medication 50 MILLIGRAM(S): at 00:01

## 2025-04-21 RX ADMIN — CLOPIDOGREL BISULFATE 75 MILLIGRAM(S): 75 TABLET, FILM COATED ORAL at 14:00

## 2025-04-21 RX ADMIN — Medication 40 MILLIEQUIVALENT(S): at 14:01

## 2025-04-21 RX ADMIN — LOSARTAN POTASSIUM 50 MILLIGRAM(S): 100 TABLET, FILM COATED ORAL at 05:02

## 2025-04-21 RX ADMIN — METOPROLOL SUCCINATE 50 MILLIGRAM(S): 50 TABLET, EXTENDED RELEASE ORAL at 18:51

## 2025-04-21 RX ADMIN — METOPROLOL SUCCINATE 50 MILLIGRAM(S): 50 TABLET, EXTENDED RELEASE ORAL at 05:02

## 2025-04-21 RX ADMIN — Medication 325 MILLIGRAM(S): at 14:04

## 2025-04-21 RX ADMIN — Medication 50 MILLIGRAM(S): at 18:51

## 2025-04-21 RX ADMIN — Medication 50 MILLIGRAM(S): at 14:22

## 2025-04-21 RX ADMIN — Medication 50 MILLIGRAM(S): at 05:02

## 2025-04-21 RX ADMIN — ATORVASTATIN CALCIUM 10 MILLIGRAM(S): 80 TABLET, FILM COATED ORAL at 22:26

## 2025-04-21 NOTE — PROGRESS NOTE ADULT - PROBLEM SELECTOR PLAN 8
DVT PPx SCD  PPI

## 2025-04-21 NOTE — DISCHARGE NOTE PROVIDER - NSDCMRMEDTOKEN_GEN_ALL_CORE_FT
Abilify 15 mg oral tablet: 1 tab(s) orally once a day with breakfast  chlorproMAZINE 50 mg oral tablet: 1 tab(s) orally 4 times a day  ferrous sulfate: 325 orally once a day  Lac-Hydrin 12% topical lotion: Apply topically to affected area once a day on bl feet and legs  Lipitor 10 mg oral tablet: 1 tab(s) orally once a day  losartan 50 mg oral tablet: 1 tab(s) orally once a day  metoprolol tartrate 50 mg oral tablet: 1 tab(s) orally 2 times a day  Plavix 75 mg oral tablet: 1 tab(s) orally once a day

## 2025-04-21 NOTE — PROGRESS NOTE ADULT - PROBLEM SELECTOR PLAN 7
-hx of PTC, aicd  on plavix

## 2025-04-21 NOTE — PROGRESS NOTE ADULT - REASON FOR ADMISSION
vomiting, diarrhea

## 2025-04-21 NOTE — DISCHARGE NOTE PROVIDER - NSFOLLOWUPCLINICS_GEN_ALL_ED_FT
[facility]  Gastroenterology  [address] [place] [address]  Phone: [phone]  Fax: [phone]  Follow Up Time: 1 month

## 2025-04-21 NOTE — PROGRESS NOTE ADULT - SUBJECTIVE AND OBJECTIVE BOX
Patient is a 75y old  Male who presents with a chief complaint of vomiting, diarrhea       INTERVAL HPI/OVERNIGHT EVENTS: pt seen and examined    MEDICATIONS  (STANDING):  ARIPiprazole 15 milliGRAM(s) Oral daily  atorvastatin 10 milliGRAM(s) Oral at bedtime  chlorproMAZINE    Tablet 50 milliGRAM(s) Oral four times a day  clopidogrel Tablet 75 milliGRAM(s) Oral daily  ferrous    sulfate 325 milliGRAM(s) Oral daily  influenza  Vaccine (HIGH DOSE) 0.5 milliLiter(s) IntraMuscular once  losartan 50 milliGRAM(s) Oral daily  metoprolol tartrate 50 milliGRAM(s) Oral two times a day  potassium chloride    Tablet ER 40 milliEquivalent(s) Oral once    MEDICATIONS  (PRN):  ondansetron Injectable 4 milliGRAM(s) IV Push every 8 hours PRN Nausea and/or Vomiting      __________________________________________________  REVIEW OF SYSTEMS:    CONSTITUTIONAL: No fever,   RESPIRATORY: No cough; No shortness of breath  CARDIOVASCULAR: No chest pain, no palpitations  GASTROINTESTINAL: No pain. No nausea or vomiting; No diarrhea   NEUROLOGICAL: No headache or numbness, no tremors  MUSCULOSKELETAL: No joint pain, no muscle pain  GENITOURINARY: no dysuria, no frequency, no hesitancy        Vital Signs Last 24 Hrs  T(C): 36.4 (21 Apr 2025 05:22), Max: 36.4 (20 Apr 2025 12:51)  T(F): 97.5 (21 Apr 2025 05:22), Max: 97.5 (20 Apr 2025 12:51)  HR: 63 (21 Apr 2025 05:22) (60 - 81)  BP: 146/83 (21 Apr 2025 05:22) (127/80 - 146/83)  BP(mean): 99 (20 Apr 2025 12:51) (99 - 99)  RR: 17 (21 Apr 2025 05:22) (17 - 18)  SpO2: 91% (21 Apr 2025 05:22) (91% - 98%)    Parameters below as of 21 Apr 2025 05:22  Patient On (Oxygen Delivery Method): room air        ________________________________________________  PHYSICAL EXAM:  GENERAL: NAD  CHEST/LUNG: dec breath sounds at bases  HEART: S1 S2  +  ABDOMEN: Soft, Nontender, Nondistended; Bowel sounds present  EXTREMITIES: no cyanosis; no edema; no calf tenderness  SKIN: warm and dry; no rash  NERVOUS SYSTEM:  Awake and alert;   _________________________________________________  LABS:                        10.5   5.63  )-----------( 439      ( 21 Apr 2025 05:29 )             33.2     04-21    140  |  108  |  7   ----------------------------<  100[H]  3.4[L]   |  25  |  0.82    Ca    8.5      21 Apr 2025 05:29        Urinalysis Basic - ( 21 Apr 2025 05:29 )    Color: x / Appearance: x / SG: x / pH: x  Gluc: 100 mg/dL / Ketone: x  / Bili: x / Urobili: x   Blood: x / Protein: x / Nitrite: x   Leuk Esterase: x / RBC: x / WBC x   Sq Epi: x / Non Sq Epi: x / Bacteria: x      CAPILLARY BLOOD GLUCOSE      POCT Blood Glucose.: 99 mg/dL (21 Apr 2025 08:02)  POCT Blood Glucose.: 108 mg/dL (21 Apr 2025 06:33)  POCT Blood Glucose.: 109 mg/dL (20 Apr 2025 23:54)        RADIOLOGY & ADDITIONAL TESTS:    Imaging Personally Reviewed:  YES/NO    Consultant(s) Notes Reviewed:   YES/ No    Care Discussed with Consultants :     Plan of care was discussed with patient and /or primary care giver; all questions and concerns were addressed and care was aligned with patient's wishes.

## 2025-04-21 NOTE — PROGRESS NOTE ADULT - PROBLEM SELECTOR PLAN 1
-CT a/p: Mildly dilated fluid-filled loops of small bowel measuring up to 3.3 cm in diameter with a transition point in the mid abdomen (2:84) consistent with at least partial obstruction. No bowel wall thickening or pneumatosis.  - Pt refused NGT - to surgery   - Initially NPO, diet advanced to liquid, now regular, tolerating    - GI - no intervention   - surgery following- conservative management

## 2025-04-21 NOTE — DISCHARGE NOTE PROVIDER - HOSPITAL COURSE
75M from [facility], ambulates with a cane, w/PMH of schizophrenia, HTN, HLD, AICD, and DM presented in ED with vomiting and diarrhea x 1 day. Found to have large episode of coffee ground emesis in ED. CT a/p: partial SBO.   Admitted to medicine for partial SBO and coffee ground emesis r/o GIB. GI and surgery following. Patient initially NPO on IV fluids.   Pt moved BM, and symptom resolved, thus diet was advanced and advised to f/u with GI as outpt in 1 month from dc.     Pt is tolerating regular diet well and hemodynamically stable. Discussed the case with attending, pt is dc back to [facility] assisted living today.

## 2025-04-21 NOTE — PROGRESS NOTE ADULT - ASSESSMENT
75M from [facility], ambulates with a cane, w/PMH of schizophrenia, HTN, HLD, AICD, and DM presents w one day of vomiting and diarrhea. Found to have large episode of coffee ground emesis in ED. CT a/p: partial SBO. Admitted to medicine for partial SBO and coffee ground emesis r/o GIB. GI and surgery following. Patient initially NPO on IV fluids.   Symptoms resolved, diet advanced to regular and tolerated

## 2025-04-21 NOTE — DISCHARGE NOTE PROVIDER - NSDCCPCAREPLAN_GEN_ALL_CORE_FT
PRINCIPAL DISCHARGE DIAGNOSIS  Diagnosis: SBO (small bowel obstruction)  Assessment and Plan of Treatment: You presented to ED with complaints of vomiting and diarrhea for 1 day. CT abdomen and pevlis was taken and showed partial small bowel obstruction. So, you were on IV fluids without P.O diet. As you moved bowel, diet was advanced and you have been tolerating well without any complications.   Hemodynamicall stable   GI specialist recommeneds you to follow up with GI within 1 month after discharge      SECONDARY DISCHARGE DIAGNOSES  Diagnosis: GIB (gastrointestinal bleeding)  Assessment and Plan of Treatment: conitnue PPI BID and f/u with GI in 1 month as an outpatient.

## 2025-04-21 NOTE — PROGRESS NOTE ADULT - SUBJECTIVE AND OBJECTIVE BOX
NP Note discussed with  primary attending    Patient is a 75y old  Male who presents with a chief complaint of vomiting, diarrhea (20 Apr 2025 09:43)      INTERVAL HPI/OVERNIGHT EVENTS: no new complaints    MEDICATIONS  (STANDING):  ARIPiprazole 15 milliGRAM(s) Oral daily  atorvastatin 10 milliGRAM(s) Oral at bedtime  chlorproMAZINE    Tablet 50 milliGRAM(s) Oral four times a day  clopidogrel Tablet 75 milliGRAM(s) Oral daily  ferrous    sulfate 325 milliGRAM(s) Oral daily  influenza  Vaccine (HIGH DOSE) 0.5 milliLiter(s) IntraMuscular once  losartan 50 milliGRAM(s) Oral daily  metoprolol tartrate 50 milliGRAM(s) Oral two times a day  potassium chloride    Tablet ER 40 milliEquivalent(s) Oral once    MEDICATIONS  (PRN):  ondansetron Injectable 4 milliGRAM(s) IV Push every 8 hours PRN Nausea and/or Vomiting      __________________________________________________  REVIEW OF SYSTEMS:    CONSTITUTIONAL: No fever,   RESPIRATORY: No cough; No shortness of breath  CARDIOVASCULAR: No chest pain, no palpitations  GASTROINTESTINAL: No pain. No nausea or vomiting; No diarrhea   NEUROLOGICAL: No headache or numbness, no tremors  MUSCULOSKELETAL: No joint pain, no muscle pain  GENITOURINARY: no dysuria, no frequency, no hesitancy        Vital Signs Last 24 Hrs  T(C): 36.4 (21 Apr 2025 05:22), Max: 36.4 (20 Apr 2025 12:51)  T(F): 97.5 (21 Apr 2025 05:22), Max: 97.5 (20 Apr 2025 12:51)  HR: 63 (21 Apr 2025 05:22) (60 - 81)  BP: 146/83 (21 Apr 2025 05:22) (127/80 - 146/83)  BP(mean): 99 (20 Apr 2025 12:51) (99 - 99)  RR: 17 (21 Apr 2025 05:22) (17 - 18)  SpO2: 91% (21 Apr 2025 05:22) (91% - 98%)    Parameters below as of 21 Apr 2025 05:22  Patient On (Oxygen Delivery Method): room air        ________________________________________________  PHYSICAL EXAM:  GENERAL: NAD  CHEST/LUNG: Clear to ausculitation bilaterally   HEART: S1 S2  regular;   ABDOMEN: Soft, Nontender, Nondistended; Bowel sounds present  EXTREMITIES: no cyanosis; no edema; no calf tenderness  SKIN: warm and dry; no rash  NERVOUS SYSTEM:  Awake and alert; Oriented  to place, person and time ; no new deficits    _________________________________________________  LABS:                        10.5   5.63  )-----------( 439      ( 21 Apr 2025 05:29 )             33.2     04-21    140  |  108  |  7   ----------------------------<  100[H]  3.4[L]   |  25  |  0.82    Ca    8.5      21 Apr 2025 05:29        Urinalysis Basic - ( 21 Apr 2025 05:29 )    Color: x / Appearance: x / SG: x / pH: x  Gluc: 100 mg/dL / Ketone: x  / Bili: x / Urobili: x   Blood: x / Protein: x / Nitrite: x   Leuk Esterase: x / RBC: x / WBC x   Sq Epi: x / Non Sq Epi: x / Bacteria: x      CAPILLARY BLOOD GLUCOSE      POCT Blood Glucose.: 99 mg/dL (21 Apr 2025 08:02)  POCT Blood Glucose.: 108 mg/dL (21 Apr 2025 06:33)  POCT Blood Glucose.: 109 mg/dL (20 Apr 2025 23:54)        RADIOLOGY & ADDITIONAL TESTS:    Imaging Personally Reviewed:  YES/NO    Consultant(s) Notes Reviewed:   YES/ No    Care Discussed with Consultants :     Plan of care was discussed with patient and /or primary care giver; all questions and concerns were addressed and care was aligned with patient's wishes.

## 2025-04-21 NOTE — PROGRESS NOTE ADULT - PROBLEM SELECTOR PROBLEM 7
AICD (automatic cardioverter/defibrillator) present

## 2025-04-21 NOTE — PROGRESS NOTE ADULT - PROVIDER SPECIALTY LIST ADULT
Surgery
Surgery
Internal Medicine
Surgery
Internal Medicine

## 2025-04-21 NOTE — PROGRESS NOTE ADULT - ASSESSMENT
75M from [facility], ambulates with a cane, w/PMH of schizophrenia, HTN, HLD, AICD, and DM presents w one day of vomiting and diarrhea. Found to have large episode of coffee ground emesis in ED. CT a/p: partial SBO. Admitted to medicine for partial SBO and coffee ground emesis r/o GIB. GI and surgery following. Patient initially NPO on IV fluids.   Symptoms resolved, diet advanced to regular and tolerated

## 2025-04-22 ENCOUNTER — TRANSCRIPTION ENCOUNTER (OUTPATIENT)
Age: 75
End: 2025-04-22

## 2025-04-22 VITALS
RESPIRATION RATE: 17 BRPM | DIASTOLIC BLOOD PRESSURE: 70 MMHG | SYSTOLIC BLOOD PRESSURE: 126 MMHG | HEART RATE: 68 BPM | OXYGEN SATURATION: 97 % | TEMPERATURE: 98 F

## 2025-04-22 LAB
ANION GAP SERPL CALC-SCNC: 6 MMOL/L — SIGNIFICANT CHANGE UP (ref 5–17)
BUN SERPL-MCNC: 10 MG/DL — SIGNIFICANT CHANGE UP (ref 7–18)
CALCIUM SERPL-MCNC: 8.9 MG/DL — SIGNIFICANT CHANGE UP (ref 8.4–10.5)
CHLORIDE SERPL-SCNC: 108 MMOL/L — SIGNIFICANT CHANGE UP (ref 96–108)
CO2 SERPL-SCNC: 24 MMOL/L — SIGNIFICANT CHANGE UP (ref 22–31)
CREAT SERPL-MCNC: 0.96 MG/DL — SIGNIFICANT CHANGE UP (ref 0.5–1.3)
EGFR: 82 ML/MIN/1.73M2 — SIGNIFICANT CHANGE UP
EGFR: 82 ML/MIN/1.73M2 — SIGNIFICANT CHANGE UP
GLUCOSE BLDC GLUCOMTR-MCNC: 105 MG/DL — HIGH (ref 70–99)
GLUCOSE BLDC GLUCOMTR-MCNC: 95 MG/DL — SIGNIFICANT CHANGE UP (ref 70–99)
GLUCOSE SERPL-MCNC: 91 MG/DL — SIGNIFICANT CHANGE UP (ref 70–99)
HCT VFR BLD CALC: 36.2 % — LOW (ref 39–50)
HGB BLD-MCNC: 11.3 G/DL — LOW (ref 13–17)
MCHC RBC-ENTMCNC: 20.1 PG — LOW (ref 27–34)
MCHC RBC-ENTMCNC: 31.2 G/DL — LOW (ref 32–36)
MCV RBC AUTO: 64.4 FL — LOW (ref 80–100)
NRBC BLD AUTO-RTO: 0 /100 WBCS — SIGNIFICANT CHANGE UP (ref 0–0)
PLATELET # BLD AUTO: 480 K/UL — HIGH (ref 150–400)
POTASSIUM SERPL-MCNC: 3.7 MMOL/L — SIGNIFICANT CHANGE UP (ref 3.5–5.3)
POTASSIUM SERPL-SCNC: 3.7 MMOL/L — SIGNIFICANT CHANGE UP (ref 3.5–5.3)
RBC # BLD: 5.62 M/UL — SIGNIFICANT CHANGE UP (ref 4.2–5.8)
RBC # FLD: 16.4 % — HIGH (ref 10.3–14.5)
SODIUM SERPL-SCNC: 138 MMOL/L — SIGNIFICANT CHANGE UP (ref 135–145)
WBC # BLD: 6.79 K/UL — SIGNIFICANT CHANGE UP (ref 3.8–10.5)
WBC # FLD AUTO: 6.79 K/UL — SIGNIFICANT CHANGE UP (ref 3.8–10.5)

## 2025-04-22 PROCEDURE — 84100 ASSAY OF PHOSPHORUS: CPT

## 2025-04-22 PROCEDURE — 74250 X-RAY XM SM INT 1CNTRST STD: CPT

## 2025-04-22 PROCEDURE — 85610 PROTHROMBIN TIME: CPT

## 2025-04-22 PROCEDURE — 85027 COMPLETE CBC AUTOMATED: CPT

## 2025-04-22 PROCEDURE — 81001 URINALYSIS AUTO W/SCOPE: CPT

## 2025-04-22 PROCEDURE — 86803 HEPATITIS C AB TEST: CPT

## 2025-04-22 PROCEDURE — 99291 CRITICAL CARE FIRST HOUR: CPT

## 2025-04-22 PROCEDURE — 80053 COMPREHEN METABOLIC PANEL: CPT

## 2025-04-22 PROCEDURE — 83735 ASSAY OF MAGNESIUM: CPT

## 2025-04-22 PROCEDURE — 87040 BLOOD CULTURE FOR BACTERIA: CPT

## 2025-04-22 PROCEDURE — 85025 COMPLETE CBC W/AUTO DIFF WBC: CPT

## 2025-04-22 PROCEDURE — 80048 BASIC METABOLIC PNL TOTAL CA: CPT

## 2025-04-22 PROCEDURE — 86900 BLOOD TYPING SEROLOGIC ABO: CPT

## 2025-04-22 PROCEDURE — 86901 BLOOD TYPING SEROLOGIC RH(D): CPT

## 2025-04-22 PROCEDURE — 36415 COLL VENOUS BLD VENIPUNCTURE: CPT

## 2025-04-22 PROCEDURE — 96374 THER/PROPH/DIAG INJ IV PUSH: CPT

## 2025-04-22 PROCEDURE — 71045 X-RAY EXAM CHEST 1 VIEW: CPT

## 2025-04-22 PROCEDURE — 86703 HIV-1/HIV-2 1 RESULT ANTBDY: CPT

## 2025-04-22 PROCEDURE — 85730 THROMBOPLASTIN TIME PARTIAL: CPT

## 2025-04-22 PROCEDURE — 74177 CT ABD & PELVIS W/CONTRAST: CPT | Mod: MC

## 2025-04-22 PROCEDURE — 86850 RBC ANTIBODY SCREEN: CPT

## 2025-04-22 PROCEDURE — 82962 GLUCOSE BLOOD TEST: CPT

## 2025-04-22 RX ADMIN — LOSARTAN POTASSIUM 50 MILLIGRAM(S): 100 TABLET, FILM COATED ORAL at 06:17

## 2025-04-22 RX ADMIN — CLOPIDOGREL BISULFATE 75 MILLIGRAM(S): 75 TABLET, FILM COATED ORAL at 12:19

## 2025-04-22 RX ADMIN — Medication 50 MILLIGRAM(S): at 00:20

## 2025-04-22 RX ADMIN — ARIPIPRAZOLE 15 MILLIGRAM(S): 2 TABLET ORAL at 12:18

## 2025-04-22 RX ADMIN — Medication 50 MILLIGRAM(S): at 12:17

## 2025-04-22 RX ADMIN — Medication 50 MILLIGRAM(S): at 06:17

## 2025-04-22 RX ADMIN — METOPROLOL SUCCINATE 50 MILLIGRAM(S): 50 TABLET, EXTENDED RELEASE ORAL at 06:17

## 2025-04-22 RX ADMIN — Medication 325 MILLIGRAM(S): at 12:19

## 2025-04-22 NOTE — DISCHARGE NOTE NURSING/CASE MANAGEMENT/SOCIAL WORK - FINANCIAL ASSISTANCE
[facility] provides services at a reduced cost to those who are determined to be eligible through [facility]’s financial assistance program. Information regarding [facility]’s financial assistance program can be found by going to [email or web address] or by calling [phone].

## 2025-04-22 NOTE — DISCHARGE NOTE NURSING/CASE MANAGEMENT/SOCIAL WORK - PATIENT PORTAL LINK FT
You can access the FollowMyHealth Patient Portal offered by [facility] by registering at the following website: [email or web address]. By joining [facility]’s FollowMyHealth portal, you will also be able to view your health information using other applications (apps) compatible with our system.

## 2025-04-28 NOTE — ED PROVIDER NOTE - CPE EDP CARDIAC NORM
Long history of IBS, with mixed constipation and diarrhea.   Recently more diarrhea, no constipation.   Using imodium.   Multiple hospitalizations with rehab stay recently, will check stool for c.diff.   Will try to eliiminate some medications that could contribute to diarrhea.   Start with holding metformin for next few weeks.   Can consider other medications--sertraline, Januvia, pantoprazole, famotidine.  She does have significant GERD, could consider alternate PPI, but I do not feel at this time she would do well without PPI.   Xifaxin is ordered, but has not yet been approved by health insurance.   She is following with GI.      normal...

## 2025-05-22 NOTE — PATIENT PROFILE ADULT - DOES PATIENT HAVE ADVANCE DIRECTIVE
Follows with Dr. 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